# Patient Record
Sex: FEMALE | Race: WHITE | Employment: OTHER | URBAN - METROPOLITAN AREA
[De-identification: names, ages, dates, MRNs, and addresses within clinical notes are randomized per-mention and may not be internally consistent; named-entity substitution may affect disease eponyms.]

---

## 2017-03-13 ENCOUNTER — HOSPITAL ENCOUNTER (OUTPATIENT)
Dept: LAB | Age: 82
Discharge: HOME | End: 2017-03-13
Payer: MEDICARE

## 2017-03-13 ENCOUNTER — OFFICE VISIT (OUTPATIENT)
Dept: INTERNAL MEDICINE CLINIC | Age: 82
End: 2017-03-13

## 2017-03-13 VITALS
HEART RATE: 53 BPM | HEIGHT: 63 IN | RESPIRATION RATE: 18 BRPM | TEMPERATURE: 97.4 F | DIASTOLIC BLOOD PRESSURE: 70 MMHG | SYSTOLIC BLOOD PRESSURE: 100 MMHG | BODY MASS INDEX: 24.06 KG/M2 | WEIGHT: 135.8 LBS | OXYGEN SATURATION: 97 %

## 2017-03-13 DIAGNOSIS — I10 ESSENTIAL HYPERTENSION, BENIGN: Primary | ICD-10-CM

## 2017-03-13 DIAGNOSIS — R73.09 OTHER ABNORMAL GLUCOSE: ICD-10-CM

## 2017-03-13 DIAGNOSIS — Z23 NEED FOR TDAP VACCINATION: ICD-10-CM

## 2017-03-13 DIAGNOSIS — Z23 ENCOUNTER FOR IMMUNIZATION: ICD-10-CM

## 2017-03-13 DIAGNOSIS — R41.3 MEMORY LOSS: ICD-10-CM

## 2017-03-13 DIAGNOSIS — Z13.5 GLAUCOMA SCREENING: ICD-10-CM

## 2017-03-13 DIAGNOSIS — Z71.89 ADVANCE DIRECTIVE DISCUSSED WITH PATIENT: ICD-10-CM

## 2017-03-13 PROCEDURE — 81001 URINALYSIS AUTO W/SCOPE: CPT

## 2017-03-13 PROCEDURE — 80053 COMPREHEN METABOLIC PANEL: CPT

## 2017-03-13 PROCEDURE — 85025 COMPLETE CBC W/AUTO DIFF WBC: CPT

## 2017-03-13 PROCEDURE — 83036 HEMOGLOBIN GLYCOSYLATED A1C: CPT

## 2017-03-13 PROCEDURE — 36415 COLL VENOUS BLD VENIPUNCTURE: CPT

## 2017-03-13 NOTE — PROGRESS NOTES
HISTORY OF PRESENT ILLNESS  Sarah Mejia is a 80 y.o. female. HPI Natan Brandt is seen today for follow up of hypertension and other medical problems. She is accompanied by her daughter Martina Archibald. 1. Essential hypertension. Stable on current regimen. 2. Memory loss. Stable. She still lives in her own home accompanied by her son who has some disabilities of his own. However, she has an excellent support network. 3. IFG. Recent labs have been acceptable. Social history notable for her being very active. She walks daily with her son LEEANNE.      MedDATA/gwo         Review of Systems   Constitutional: Negative for weight loss. Respiratory: Negative. Cardiovascular: Negative for chest pain, palpitations, leg swelling and PND. Musculoskeletal: Negative for myalgias. Neurological: Negative for focal weakness. Psychiatric/Behavioral: Positive for memory loss. Physical Exam   Constitutional: She appears well-nourished. Neck: Carotid bruit is not present. Cardiovascular: Normal rate and regular rhythm. Exam reveals no gallop and no friction rub. No murmur heard. Pulmonary/Chest: Effort normal and breath sounds normal. No respiratory distress. Musculoskeletal: She exhibits no edema. Nursing note and vitals reviewed. ASSESSMENT and PLAN  Natan Brandt was seen today for medication evaluation. Diagnoses and all orders for this visit:    Essential hypertension, benign  -     METABOLIC PANEL, COMPREHENSIVE  -     CBC WITH AUTOMATED DIFF  -     URINALYSIS W/MICROSCOPIC    Need for Tdap vaccination  -     diph,Pertuss,Acell,,Tet Vac-PF (ADACEL) 2 Lf-(2.5-5-3-5 mcg)-5Lf/0.5 mL susp; 0.5 mL by IntraMUSCular route once for 1 dose. Encounter for immunization  -     pneumococcal 13 breanna conj dip (PREVNAR-13) 0.5 mL syrg injection; 0.5 mL by IntraMUSCular route once for 1 dose.     Glaucoma screening  -     REFERRAL TO OPHTHALMOLOGY    Advance directive discussed with patient    Other abnormal glucose  -     HEMOGLOBIN A1C WITH EAG    Memory loss- Continue current regimen of prescription and / or OTC medications     Other orders  -     MICROSCOPIC EXAMINATION    This has been fully explained to the patient, who indicates understanding.

## 2017-03-13 NOTE — MR AVS SNAPSHOT
Visit Information Date & Time Provider Department Dept. Phone Encounter #  
 3/13/2017  3:25 PM Atha Oppenheim, [address] [facility] [id number] Follow-up Instructions Return in about 1 year (around 3/13/2018). Upcoming Health Maintenance Date Due  
 GLAUCOMA SCREENING Q2Y 5/21/1990 MEDICARE YEARLY EXAM 5/21/1990 Pneumococcal 65+ Low/Medium Risk (2 of 2 - PPSV23) 10/27/2010 DTaP/Tdap/Td series (1 - Tdap) 5/13/2013 INFLUENZA AGE 9 TO ADULT 8/1/2016 Allergies as of 3/13/2017  Review Complete On: 3/13/2017 By: Atha Oppenheim, MD  
  
 Severity Noted Reaction Type Reactions Cephalexin  11/21/2009    Nausea Only Macrobid [Nitrofurantoin Monohyd/m-cryst]  11/21/2009    Myalgia Razadyne [Galantamine]  11/21/2009    Nausea Only Sulfa (Sulfonamide Antibiotics)  11/21/2009    Rash Current Immunizations  Reviewed on 3/13/2017 Name Date Influenza High Dose Vaccine PF 1/1/2017, 11/24/2015 Influenza Vaccine Split 10/10/2010 PPD 8/23/2011 Pneumococcal Vaccine (Unspecified Type) 10/27/2005 Td 5/12/2013 Zoster 6/1/2007 Reviewed by Rodríguez Rahman on 3/13/2017 at  3:48 PM  
You Were Diagnosed With   
  
 Codes Comments Essential hypertension, benign    -  Primary ICD-10-CM: I10 
ICD-9-CM: 401.1 Need for Tdap vaccination     ICD-10-CM: Z93 ICD-9-CM: V06.1 Encounter for immunization     ICD-10-CM: U90 ICD-9-CM: V03.89 Glaucoma screening     ICD-10-CM: Z13.5 ICD-9-CM: V80.1 Advance directive discussed with patient     ICD-10-CM: Z71.89 ICD-9-CM: V65.49 Other abnormal glucose     ICD-10-CM: R73.09 
ICD-9-CM: 790.29 Memory loss     ICD-10-CM: R41.3 ICD-9-CM: 780.93 Vitals BP Pulse Temp Resp Height(growth percentile) Weight(growth percentile)  100/70 (BP 1 Location: Right arm, BP Patient Position: Sitting) (!) 53 97.4 °F (36.3 °C) (Oral) 18 5' 3\" (1.6 m) 135 lb 12.8 oz (61.6 kg) SpO2 BMI OB Status Smoking Status 97% 24.06 kg/m2 Postmenopausal Never Smoker BMI and BSA Data Body Mass Index Body Surface Area 24.06 kg/m 2 1.65 m 2 Preferred Pharmacy Pharmacy Name Phone [id number] [address], [address] [phone] Your Updated Medication List  
  
   
This list is accurate as of: 3/13/17  4:24 PM.  Always use your most recent med list.  
  
  
  
  
 atenolol 25 mg tablet Commonly known as:  TENORMIN  
TAKE 1 TABLET BY MOUTH EVERY MORNING  
  
 desonide 0.05 % cream  
Commonly known as:  TRIDESILON Apply  to affected area two (2) times daily as needed for Skin Irritation. diph,Pertuss(Acell),Tet Vac-PF 2 Lf-(2.5-5-3-5 mcg)-5Lf/0.5 mL susp Commonly known as:  ADACEL  
0.5 mL by IntraMUSCular route once for 1 dose. donepezil 10 mg tablet Commonly known as:  ARICEPT  
TAKE 1 TAB BY MOUTH DAILY. hydroCHLOROthiazide 12.5 mg capsule Commonly known as:   TAKE ONE CAPSULE BY MOUTH EVERY MORNING  
  
 KLOR-CON 8 8 mEq tablet Generic drug:  potassium chloride SR  
TAKE 1 TABLET BY MOUTH EVERY DAY  
  
 memantine 10 mg tablet Commonly known as:  Jerry Congo TAKE 1 TABLET BY MOUTH TWICE A DAY  
  
 pneumococcal 13 breanna conj dip 0.5 mL Syrg injection Commonly known as:  PREVNAR-13  
0.5 mL by IntraMUSCular route once for 1 dose. THERAPEUTIC MULTIVIT/MINERAL 27-0.4 mg Tab Generic drug:  multivitamin,tx-iron-ca-min Take 1 Tab by mouth every morning. Prescriptions Printed Refills diph,Pertuss,Acell,,Tet Vac-PF (ADACEL) 2 Lf-(2.5-5-3-5 mcg)-5Lf/0.5 mL susp 0 Si.5 mL by IntraMUSCular route once for 1 dose. Class: Print Route: IntraMUSCular  
 pneumococcal 13 breanna conj dip (PREVNAR-13) 0.5 mL syrg injection 0 Si.5 mL by IntraMUSCular route once for 1 dose. Class: Print Route: IntraMUSCular We Performed the Following CBC WITH AUTOMATED DIFF [26969 CPT(R)] HEMOGLOBIN A1C WITH EAG [62086 CPT(R)] METABOLIC PANEL, COMPREHENSIVE [07669 CPT(R)] REFERRAL TO OPHTHALMOLOGY [REF57 Custom] URINALYSIS W/MICROSCOPIC [31716 CPT(R)] Follow-up Instructions Return in about 1 year (around 3/13/2018). Referral Information Referral ID Referred By Referred To  
  
 [id number] SANDER TRUONG Not Available Visits Status Start Date End Date 1 New Request 3/13/17 3/13/18 If your referral has a status of pending review or denied, additional information will be sent to support the outcome of this decision. Patient Instructions End of life planning discussed with patient. Patient states that they do have an advance directive and will provide a copy for our office at next visit. Introducing [facility] & HEALTH SERVICES! Ike Schwartz introduces Quitbit patient portal. Now you can access parts of your medical record, email your doctor's office, and request medication refills online. 1. In your internet browser, go to [email or web address] 2. Click on the First Time User? Click Here link in the Sign In box. You will see the New Member Sign Up page. 3. Enter your Quitbit Access Code exactly as it appears below. You will not need to use this code after youve completed the sign-up process. If you do not sign up before the expiration date, you must request a new code. · Quitbit Access Code: [email or web address] Expires: 6/11/2017  3:35 PM 
 
4. Enter the last four digits of your Social Security Number (xxxx) and Date of Birth (mm/dd/yyyy) as indicated and click Submit. You will be taken to the next sign-up page. 5. Create a Packbackt ID. This will be your Quitbit login ID and cannot be changed, so think of one that is secure and easy to remember. 6. Create a Packbackt password. You can change your password at any time. 7. Enter your Password Reset Question and Answer. This can be used at a later time if you forget your password. 8. Enter your e-mail address. You will receive e-mail notification when new information is available in [address] 9. Click Sign Up. You can now view and download portions of your medical record. 10. Click the Download Summary menu link to download a portable copy of your medical information. If you have questions, please visit the Frequently Asked Questions section of the ClickMedix website. Remember, ClickMedix is NOT to be used for urgent needs. For medical emergencies, dial 911. Now available from your iPhone and Android! Please provide this summary of care documentation to your next provider. Your primary care clinician is listed as SANDER TRUONG. If you have any questions after today's visit, please call [phone].

## 2017-03-14 LAB
ALBUMIN SERPL-MCNC: 4.3 G/DL (ref 3.2–4.6)
ALBUMIN/GLOB SERPL: 1.7 {RATIO} (ref 1.2–2.2)
ALP SERPL-CCNC: 61 IU/L (ref 39–117)
ALT SERPL-CCNC: 12 IU/L (ref 0–32)
APPEARANCE UR: ABNORMAL
AST SERPL-CCNC: 16 IU/L (ref 0–40)
BACTERIA #/AREA URNS HPF: ABNORMAL /[HPF]
BASOPHILS # BLD AUTO: 0 X10E3/UL (ref 0–0.2)
BASOPHILS NFR BLD AUTO: 1 %
BILIRUB SERPL-MCNC: 0.4 MG/DL (ref 0–1.2)
BILIRUB UR QL STRIP: NEGATIVE
BUN SERPL-MCNC: 24 MG/DL (ref 10–36)
BUN/CREAT SERPL: 34 (ref 11–26)
CALCIUM SERPL-MCNC: 9.4 MG/DL (ref 8.7–10.3)
CASTS URNS QL MICRO: ABNORMAL /LPF
CHLORIDE SERPL-SCNC: 100 MMOL/L (ref 96–106)
CO2 SERPL-SCNC: 26 MMOL/L (ref 18–29)
COLOR UR: YELLOW
CREAT SERPL-MCNC: 0.7 MG/DL (ref 0.57–1)
EOSINOPHIL # BLD AUTO: 0.1 X10E3/UL (ref 0–0.4)
EOSINOPHIL NFR BLD AUTO: 2 %
EPI CELLS #/AREA URNS HPF: ABNORMAL /HPF
ERYTHROCYTE [DISTWIDTH] IN BLOOD BY AUTOMATED COUNT: 14.6 % (ref 12.3–15.4)
EST. AVERAGE GLUCOSE BLD GHB EST-MCNC: 105 MG/DL
GLOBULIN SER CALC-MCNC: 2.5 G/DL (ref 1.5–4.5)
GLUCOSE SERPL-MCNC: 85 MG/DL (ref 65–99)
GLUCOSE UR QL: NEGATIVE
HBA1C MFR BLD: 5.3 % (ref 4.8–5.6)
HCT VFR BLD AUTO: 47.9 % (ref 34–46.6)
HGB BLD-MCNC: 16 G/DL (ref 11.1–15.9)
HGB UR QL STRIP: ABNORMAL
IMM GRANULOCYTES # BLD: 0 X10E3/UL (ref 0–0.1)
IMM GRANULOCYTES NFR BLD: 0 %
KETONES UR QL STRIP: NEGATIVE
LEUKOCYTE ESTERASE UR QL STRIP: ABNORMAL
LYMPHOCYTES # BLD AUTO: 1 X10E3/UL (ref 0.7–3.1)
LYMPHOCYTES NFR BLD AUTO: 15 %
MCH RBC QN AUTO: 30.2 PG (ref 26.6–33)
MCHC RBC AUTO-ENTMCNC: 33.4 G/DL (ref 31.5–35.7)
MCV RBC AUTO: 90 FL (ref 79–97)
MICRO URNS: ABNORMAL
MONOCYTES # BLD AUTO: 0.9 X10E3/UL (ref 0.1–0.9)
MONOCYTES NFR BLD AUTO: 14 %
MUCOUS THREADS URNS QL MICRO: PRESENT
NEUTROPHILS # BLD AUTO: 4.6 X10E3/UL (ref 1.4–7)
NEUTROPHILS NFR BLD AUTO: 68 %
NITRITE UR QL STRIP: POSITIVE
PH UR STRIP: 6.5 [PH] (ref 5–7.5)
PLATELET # BLD AUTO: 180 X10E3/UL (ref 150–379)
POTASSIUM SERPL-SCNC: 4.2 MMOL/L (ref 3.5–5.2)
PROT SERPL-MCNC: 6.8 G/DL (ref 6–8.5)
PROT UR QL STRIP: ABNORMAL
RBC # BLD AUTO: 5.3 X10E6/UL (ref 3.77–5.28)
RBC #/AREA URNS HPF: ABNORMAL /HPF
SODIUM SERPL-SCNC: 144 MMOL/L (ref 134–144)
SP GR UR: 1.02 (ref 1–1.03)
UROBILINOGEN UR STRIP-MCNC: 1 MG/DL (ref 0.2–1)
WBC # BLD AUTO: 6.8 X10E3/UL (ref 3.4–10.8)
WBC #/AREA URNS HPF: >30 /HPF

## 2017-03-14 NOTE — PROGRESS NOTES
Call pt and daughter Rodo Bettencourt- There may be a uti. If symptoms are present obtain culture and start treatment. If no symptoms, obtain culture and treat only if positive.    Will review rest of labs following return of urine culture

## 2017-03-15 ENCOUNTER — HOSPITAL ENCOUNTER (OUTPATIENT)
Dept: LAB | Age: 82
Discharge: HOME | End: 2017-03-15
Payer: MEDICARE

## 2017-03-15 DIAGNOSIS — N39.0 URINARY TRACT INFECTION, SITE UNSPECIFIED: Primary | ICD-10-CM

## 2017-03-15 PROCEDURE — 87186 SC STD MICRODIL/AGAR DIL: CPT

## 2017-03-15 PROCEDURE — 87086 URINE CULTURE/COLONY COUNT: CPT

## 2017-03-15 PROCEDURE — 87088 URINE BACTERIA CULTURE: CPT

## 2017-03-15 NOTE — PROGRESS NOTES
Spoke with pt's daughter Prince Marti (on [facility]) - advised pt's urinalysis shows she may have a UTI - she states pt not complaining of any symptoms at this time. Advised MD recommends she return for urine culture - if positive will treat. Lab slip at . She will bring pt in today. MD will review rest of labs following return of urine culture.

## 2017-03-17 DIAGNOSIS — R79.9 ABNORMAL FINDING OF BLOOD CHEMISTRY: ICD-10-CM

## 2017-03-17 DIAGNOSIS — D75.1 POLYCYTHEMIA: Primary | ICD-10-CM

## 2017-03-17 DIAGNOSIS — N39.0 URINARY TRACT INFECTION, SITE UNSPECIFIED: ICD-10-CM

## 2017-03-17 LAB — BACTERIA UR CULT: ABNORMAL

## 2017-03-17 RX ORDER — DOXYCYCLINE 100 MG/1
100 TABLET ORAL 2 TIMES DAILY
Qty: 10 TAB | Refills: 0 | Status: SHIPPED | OUTPATIENT
Start: 2017-03-17 | End: 2017-03-22

## 2017-03-17 NOTE — PROGRESS NOTES
Call daughter Anabella Ferris- 2 things  - there is a uti, start doxycycline 100 mg bid x 5 d. Take with food,, will tg urine with culture in 1 mo-   - there is a slight increase in red cell count, likely nothing but need to tg 1 mo : cbc with diff, iron, ferritin= dx is polycythemia.  Lab only, no visit needed  - other Labs look great overall

## 2017-03-17 NOTE — PROGRESS NOTES
Spoke with pt's son Sandra Valencia (on [employer]) advised there are 2 things with pt's labs:  - there is a uti, start doxycycline 100 mg bid x 5 d. Take with food - will tg urine with culture in 1 month - sent to pharmacy. - there is a slight increase in red cell count - likely nothing but need to tg 1 month - cbc with diff, iron, ferritin= dx is polycythemia. Lab only - no visit needed - mailed lab slip to son's home address as he requested - [place] [address] [place] [address], [address] Advised pt's other labs look great overall.

## 2017-05-01 ENCOUNTER — HOSPITAL ENCOUNTER (OUTPATIENT)
Dept: LAB | Age: 82
Discharge: HOME | End: 2017-05-01
Payer: MEDICARE

## 2017-05-01 PROCEDURE — 87088 URINE BACTERIA CULTURE: CPT

## 2017-05-01 PROCEDURE — 83550 IRON BINDING TEST: CPT

## 2017-05-01 PROCEDURE — 85025 COMPLETE CBC W/AUTO DIFF WBC: CPT

## 2017-05-01 PROCEDURE — 36415 COLL VENOUS BLD VENIPUNCTURE: CPT

## 2017-05-01 PROCEDURE — 82728 ASSAY OF FERRITIN: CPT

## 2017-05-02 LAB
BACTERIA UR CULT: NORMAL
BASOPHILS # BLD AUTO: 0 X10E3/UL (ref 0–0.2)
BASOPHILS NFR BLD AUTO: 1 %
EOSINOPHIL # BLD AUTO: 0.2 X10E3/UL (ref 0–0.4)
EOSINOPHIL NFR BLD AUTO: 2 %
ERYTHROCYTE [DISTWIDTH] IN BLOOD BY AUTOMATED COUNT: 14.6 % (ref 12.3–15.4)
FERRITIN SERPL-MCNC: 227 NG/ML (ref 15–150)
HCT VFR BLD AUTO: 47.6 % (ref 34–46.6)
HGB BLD-MCNC: 15.7 G/DL (ref 11.1–15.9)
IMM GRANULOCYTES # BLD: 0 X10E3/UL (ref 0–0.1)
IMM GRANULOCYTES NFR BLD: 0 %
IRON SATN MFR SERPL: 29 % (ref 15–55)
IRON SERPL-MCNC: 80 UG/DL (ref 27–139)
LYMPHOCYTES # BLD AUTO: 1.6 X10E3/UL (ref 0.7–3.1)
LYMPHOCYTES NFR BLD AUTO: 21 %
MCH RBC QN AUTO: 29.5 PG (ref 26.6–33)
MCHC RBC AUTO-ENTMCNC: 33 G/DL (ref 31.5–35.7)
MCV RBC AUTO: 89 FL (ref 79–97)
MONOCYTES # BLD AUTO: 0.7 X10E3/UL (ref 0.1–0.9)
MONOCYTES NFR BLD AUTO: 9 %
NEUTROPHILS # BLD AUTO: 5.1 X10E3/UL (ref 1.4–7)
NEUTROPHILS NFR BLD AUTO: 67 %
PLATELET # BLD AUTO: 228 X10E3/UL (ref 150–379)
RBC # BLD AUTO: 5.33 X10E6/UL (ref 3.77–5.28)
TIBC SERPL-MCNC: 274 UG/DL (ref 250–450)
UIBC SERPL-MCNC: 194 UG/DL (ref 118–369)
WBC # BLD AUTO: 7.5 X10E3/UL (ref 3.4–10.8)

## 2017-05-02 NOTE — PROGRESS NOTES
Call daughter Kale Abel- repeat labs are ok, no sign of iron overload.  No need for further follow up for this

## 2017-05-02 NOTE — PROGRESS NOTES
Advised pt's daughter Grayson Abdullahi - repeat labs are ok - no sign of iron overload. No need for further follow up for this.

## 2017-05-07 RX ORDER — ATENOLOL 25 MG/1
TABLET ORAL
Qty: 30 TAB | Refills: 10 | Status: SHIPPED | OUTPATIENT
Start: 2017-05-07 | End: 2018-04-03 | Stop reason: SDUPTHER

## 2017-05-20 RX ORDER — DONEPEZIL HYDROCHLORIDE 10 MG/1
TABLET, FILM COATED ORAL
Qty: 30 TAB | Refills: 11 | Status: SHIPPED | OUTPATIENT
Start: 2017-05-20 | End: 2018-02-20

## 2017-05-22 ENCOUNTER — TELEPHONE (OUTPATIENT)
Dept: INTERNAL MEDICINE CLINIC | Age: 82
End: 2017-05-22

## 2017-05-22 NOTE — TELEPHONE ENCOUNTER
Spoke with pt's daughter Melisa Mcneil - states she has concerns that pt has not been taking her medications as she should. Has a brother who helps with her care - he feels pt is 80 and does not need to take all these meds. We discussed the medications she is on - MD would not have her on medications if she did not need them. She wants to know what MD thinks? Should she schedule an appt to review with brother?  Will forward to MD.

## 2017-05-22 NOTE — TELEPHONE ENCOUNTER
[phone] pt's daughter is requesting a call regarding her radhaer's medications, she says that she just found out yesterday she is not taking her meds like she should and she does not know what to do about it.

## 2017-05-23 NOTE — TELEPHONE ENCOUNTER
Advised pt's daughter Harsha Vidales MD said pt is on medications appropriate for her medical conditions, if they want to schedule a visit to discuss this they could. She staes she did not need appt - just wanted MD to tell her so she could tell her brother.

## 2017-07-23 RX ORDER — POTASSIUM CHLORIDE 600 MG/1
TABLET, FILM COATED, EXTENDED RELEASE ORAL
Qty: 30 TAB | Refills: 11 | Status: SHIPPED | COMMUNITY
Start: 2017-07-23 | End: 2018-05-11 | Stop reason: SDUPTHER

## 2017-07-23 RX ORDER — HYDROCHLOROTHIAZIDE 12.5 MG/1
CAPSULE ORAL
Qty: 30 CAP | Refills: 11 | Status: SHIPPED | COMMUNITY
Start: 2017-07-23 | End: 2018-05-09 | Stop reason: SDUPTHER

## 2017-07-25 ENCOUNTER — OFFICE VISIT (OUTPATIENT)
Dept: INTERNAL MEDICINE CLINIC | Age: 82
End: 2017-07-25

## 2017-07-25 ENCOUNTER — TELEPHONE (OUTPATIENT)
Dept: INTERNAL MEDICINE CLINIC | Age: 82
End: 2017-07-25

## 2017-07-25 VITALS
HEART RATE: 59 BPM | OXYGEN SATURATION: 97 % | DIASTOLIC BLOOD PRESSURE: 80 MMHG | TEMPERATURE: 97.5 F | SYSTOLIC BLOOD PRESSURE: 130 MMHG | WEIGHT: 132.6 LBS | HEIGHT: 63 IN | BODY MASS INDEX: 23.5 KG/M2 | RESPIRATION RATE: 19 BRPM

## 2017-07-25 DIAGNOSIS — B02.9 HERPES ZOSTER WITHOUT COMPLICATION: Primary | ICD-10-CM

## 2017-07-25 RX ORDER — VALACYCLOVIR HYDROCHLORIDE 1 G/1
1000 TABLET, FILM COATED ORAL 3 TIMES DAILY
Qty: 21 TAB | Refills: 0 | Status: SHIPPED | OUTPATIENT
Start: 2017-07-25 | End: 2017-08-01

## 2017-07-25 NOTE — PATIENT INSTRUCTIONS

## 2017-07-25 NOTE — TELEPHONE ENCOUNTER
Called and spoke to pt daughter Dain Hauser- she states she org called the on call doctor, her mother is very confused and has dementia. She said her brother that lives with her mother was recently dxed with shingles so not sure if she now has it also. when she went to see her mother over the weekend  she had a new facial rash- describes the rash as being reddened with a round [place] pattern. She states her mother states the rash causes no pain or itching. Advised she should have her mother come in for a ov for evaluation/treatment. Dr. Sofía Billings schedule is all booked for today.  I have added pt to schedule today to see Dr. Maribel Hong @ 3:15pm.

## 2017-07-25 NOTE — TELEPHONE ENCOUNTER
Received call from patient stating that she had a rash on her face. When I called back she asked \"Why are you calling me? Maybe my daughter called\"  Attempted to call her daughter Marti Speaks (also received page from her) but went to nondescript answering machine so message so left answer service. Will forward to Team 3 to contact the patient for an acute appt today.

## 2017-07-25 NOTE — MR AVS SNAPSHOT
Visit Information Date & Time Provider Department Dept. Phone Encounter #  
 7/25/2017  3:15 PM Jared Chinchilla, 12[address] [facility] [phone] [id number] Follow-up Instructions Return if symptoms worsen or fail to improve. Upcoming Health Maintenance Date Due  
 GLAUCOMA SCREENING Q2Y 5/21/1990 MEDICARE YEARLY EXAM 5/21/1990 INFLUENZA AGE 9 TO ADULT 8/1/2017 DTaP/Tdap/Td series (2 - Td) 4/6/2027 Allergies as of 7/25/2017  Review Complete On: 7/25/2017 By: Jared Chinchilla MD  
  
 Severity Noted Reaction Type Reactions Cephalexin  11/21/2009    Nausea Only Macrobid [Nitrofurantoin Monohyd/m-cryst]  11/21/2009    Myalgia Razadyne [Galantamine]  11/21/2009    Nausea Only Sulfa (Sulfonamide Antibiotics)  11/21/2009    Rash Current Immunizations  Reviewed on 4/11/2017 Name Date Influenza High Dose Vaccine PF 1/1/2017, 11/24/2015 Influenza Vaccine Split 10/10/2010 PPD 8/23/2011 Pneumococcal Conjugate (PCV-13) 3/28/2017 Td 5/12/2013 Tdap 4/6/2017 ZZZ-RETIRED (DO NOT USE) Pneumococcal Vaccine (Unspecified Type) 10/27/2005 Zoster 6/1/2007 Not reviewed this visit You Were Diagnosed With   
  
 Codes Comments Herpes zoster without complication    -  Primary ICD-10-CM: B02.9 ICD-9-CM: 887. 9 Vitals BP Pulse Temp Resp Height(growth percentile) Weight(growth percentile) 130/80 (BP 1 Location: Right arm, BP Patient Position: Sitting) (!) 59 97.5 °F (36.4 °C) (Oral) 19 5' 3\" (1.6 m) 132 lb 9.6 oz (60.1 kg) SpO2 BMI OB Status Smoking Status 97% 23.49 kg/m2 Postmenopausal Never Smoker BMI and BSA Data Body Mass Index Body Surface Area  
 23.49 kg/m 2 1.63 m 2 Preferred Pharmacy Pharmacy Name Phone [id number] [phone] Your Updated Medication List  
  
   
 This list is accurate as of: 7/25/17  4:49 PM.  Always use your most recent med list.  
  
  
  
  
 atenolol 25 mg tablet Commonly known as:  TENORMIN  
TAKE 1 TABLET BY MOUTH EVERY MORNING  
  
 desonide 0.05 % cream  
Commonly known as:  TRIDESILON Apply  to affected area two (2) times daily as needed for Skin Irritation. donepezil 10 mg tablet Commonly known as:  ARICEPT  
TAKE 1 TAB BY MOUTH DAILY. hydroCHLOROthiazide 12.5 mg capsule Commonly known as:  Lacho Jyotsna TAKE ONE CAPSULE BY MOUTH EVERY MORNING  
  
 KLOR-CON 8 8 mEq tablet Generic drug:  potassium chloride SR  
TAKE 1 TABLET BY MOUTH EVERY DAY  
  
 memantine 10 mg tablet Commonly known as:  Mammie Nation TAKE 1 TABLET BY MOUTH TWICE A DAY THERAPEUTIC MULTIVIT/MINERAL 27-0.4 mg Tab Generic drug:  multivitamin,tx-iron-ca-min Take 1 Tab by mouth every morning. valACYclovir 1 gram tablet Commonly known as:  VALTREX Take 1 Tab by mouth three (3) times daily for 7 days. Prescriptions Sent to Pharmacy Refills  
 valACYclovir (VALTREX) 1 gram tablet 0 Sig: Take 1 Tab by mouth three (3) times daily for 7 days. Class: Normal  
 Pharmacy: [facility]/pharmacy #[id number] Ph #: [phone] Route: Oral  
  
Follow-up Instructions Return if symptoms worsen or fail to improve. Patient Instructions Shingles: Care Instructions Your Care Instructions Shingles (herpes zoster) causes pain and a blistered rash. The rash can appear anywhere on the body but will be on only one side of the body, the left or right. It will be in a band, a strip, or a small area. The pain can be very severe. Shingles can also cause tingling or itching in the area of the rash. The blisters scab over after a few days and heal in 2 to 4 weeks. Medicines can help you feel better and may help prevent more serious problems caused by shingles. Shingles is caused by the same virus that causes chickenpox. When you have chickenpox, the virus gets into your nerve roots and stays there (becomes dormant) long after you get over the chickenpox. If the virus becomes active again, it can cause shingles. Follow-up care is a key part of your treatment and safety. Be sure to make and go to all appointments, and call your doctor if you are having problems. It's also a good idea to know your test results and keep a list of the medicines you take. How can you care for yourself at home? · Be safe with medicines. Take your medicines exactly as prescribed. Call your doctor if you think you are having a problem with your medicine. Antiviral medicine helps you get better faster. · Try not to scratch or pick at the blisters. They will crust over and fall off on their own if you leave them alone. · Put cool, wet cloths on the area to relieve pain and itching. You can also use calamine lotion. Try not to use so much lotion that it cakes and is hard to get off. · Put cornstarch or baking soda on the sores to help dry them out so they heal faster. · Do not use thick ointment, such as petroleum jelly, on the sores. This will keep them from drying and healing. · To help remove loose crusts, soak them in tap water. This can help decrease oozing, and dry and soothe the skin. · Take an over-the-counter pain medicine, such as acetaminophen (Tylenol), ibuprofen (Advil, Motrin), or naproxen (Aleve). Read and follow all instructions on the label. · Avoid close contact with people until the blisters have healed. It is very important for you to avoid contact with anyone who has never had chickenpox or the chickenpox vaccine. Pregnant women, young babies, and anyone else who has a hard time fighting infection (such as someone with HIV, diabetes, or cancer) is especially at risk. When should you call for help? Call your doctor now or seek immediate medical care if: · You have a new or higher fever. · You have a severe headache and a stiff neck. · You lose the ability to think clearly. · The rash spreads to your forehead, nose, eyes, or eyelids. · You have eye pain, or your vision gets worse. · You have new pain in your face, or you cannot move the muscles in your face. · Blisters spread to new parts of your body. Watch closely for changes in your health, and be sure to contact your doctor if: · The rash has not healed after 2 to 4 weeks. · You still have pain after the rash has healed. Where can you learn more? Go to [email or web address]-johanna.info/. Juan Flatten in the search box to learn more about \"Shingles: Care Instructions. \" Current as of: March 3, 2017 Content Version: 11.3 © 7135-3267 [employer]. Care instructions adapted under license by [employer] (which disclaims liability or warranty for this information). If you have questions about a medical condition or this instruction, always ask your healthcare professional. [address] any warranty or liability for your use of this information. Introducing [facility] & HEALTH SERVICES! Glenn Gordon introduces [employer] Products [employer] patient portal. Now you can access parts of your medical record, email your doctor's office, and request medication refills online. 1. In your internet browser, go to [email or web address] 2. Click on the First Time User? Click Here link in the Sign In box. You will see the New Member Sign Up page. 3. Enter your [employer] Access Code exactly as it appears below. You will not need to use this code after youve completed the sign-up process. If you do not sign up before the expiration date, you must request a new code. · [employer] Access Code: [email or web address] Expires: 10/23/2017 11:38 AM 
 
4.  Enter the last four digits of your Social Security Number (xxxx) and Date of Birth (mm/dd/yyyy) as indicated and click Submit. You will be taken to the next sign-up page. 5. Create a PlayFilm ID. This will be your PlayFilm login ID and cannot be changed, so think of one that is secure and easy to remember. 6. Create a PlayFilm password. You can change your password at any time. 7. Enter your Password Reset Question and Answer. This can be used at a later time if you forget your password. 8. Enter your e-mail address. You will receive e-mail notification when new information is available in [address] 9. Click Sign Up. You can now view and download portions of your medical record. 10. Click the Download Summary menu link to download a portable copy of your medical information. If you have questions, please visit the Frequently Asked Questions section of the PlayFilm website. Remember, PlayFilm is NOT to be used for urgent needs. For medical emergencies, dial 911. Now available from your iPhone and Android! Please provide this summary of care documentation to your next provider. Your primary care clinician is listed as SANDER TRUONG. If you have any questions after today's visit, please call [phone].

## 2017-07-25 NOTE — PROGRESS NOTES
Jessa Deal is a 80 y.o. female who was seen in clinic today (7/25/2017). Returns to clinic with her son and daughter. Patient was checked in but PSR did not complete the process and patient sat in the waiting room for > 1 hour. Assessment & Plan:  Diagnoses and all orders for this visit:    1. Herpes zoster without complication- new diagnosis, reviewed differential diagnosis with her and family, looks classic, will start with meds below. Red flags and expectations were reviewed & discussed with the her. She verbalized understanding.   -     valACYclovir (VALTREX) 1 gram tablet; Take 1 Tab by mouth three (3) times daily for 7 days. Follow-up Disposition:  Return if symptoms worsen or fail to improve.       ----------------------------------------------------------------------    Subjective:  Junior White was seen today for Shingles    Dermatology Review  She is here to talk about skin lesions. She denies any issues. Family noticed it a yesterday, but last time they saw her was 4day ago, and with no changes since that time. Location: face. Treatment to date has included none. Her son has an active case of shingles currently. She did get shingles vaccine in the past.      ----------------------------------------------------------------------      Prior to Admission medications    Medication Sig Start Date End Date Taking? Authorizing Provider   KLOR-CON 8 8 mEq tablet TAKE 1 TABLET BY MOUTH EVERY DAY 7/23/17  Yes Marcus Wasserman MD   hydroCHLOROthiazide (MICROZIDE) 12.5 mg capsule TAKE ONE CAPSULE BY MOUTH EVERY MORNING 7/23/17  Yes Marcus Wasserman MD   donepezil (ARICEPT) 10 mg tablet TAKE 1 TAB BY MOUTH DAILY.  5/20/17  Yes Marcus Wasserman MD   atenolol (TENORMIN) 25 mg tablet TAKE 1 TABLET BY MOUTH EVERY MORNING 5/7/17  Yes Marcus Wasserman MD   memantine (NAMENDA) 10 mg tablet TAKE 1 TABLET BY MOUTH TWICE A DAY 5/4/16  Yes Marcus Wasserman MD   desonide (TRIDESILON) 0.05 % cream Apply  to affected area two (2) times daily as needed for Skin Irritation. 6/30/15  Yes Sachin Thomas MD   multivitamin,tx-iron-ca-min (THERAPEUTIC MULTIVIT/MINERAL) 27-0.4 mg Tab Take 1 Tab by mouth every morning. Yes Historical Provider          Allergies   Allergen Reactions    Cephalexin Nausea Only    Macrobid [Nitrofurantoin Monohyd/M-Cryst] Myalgia    Razadyne [Galantamine] Nausea Only    Sulfa (Sulfonamide Antibiotics) Rash           ROS: no fevers or chills      Objective:   Physical Exam   Constitutional: No distress. Cardiovascular: Regular rhythm and normal heart sounds. Bradycardia present. No murmur heard. Pulmonary/Chest: Effort normal and breath sounds normal. She has no wheezes. She has no rales. Skin:   10 sporadic lesions on the R side of the face, in the V2 dermatome, no vesicles or pustules. Covered by make up         Visit Vitals    /80 (BP 1 Location: Right arm, BP Patient Position: Sitting)    Pulse (!) 59    Temp 97.5 °F (36.4 °C) (Oral)    Resp 19    Ht 5' 3\" (1.6 m)    Wt 132 lb 9.6 oz (60.1 kg)    SpO2 97%    BMI 23.49 kg/m2         Disclaimer:  Advised her to call back or return to office if symptoms worsen/change/persist.  Discussed expected course/resolution/complications of diagnosis in detail with patient. Medication risks/benefits/costs/interactions/alternatives discussed with patient. She was given an after visit summary which includes diagnoses, current medications, & vitals. She expressed understanding with the diagnosis and plan.         Tomeka Avila MD

## 2017-07-31 ENCOUNTER — TELEPHONE (OUTPATIENT)
Dept: INTERNAL MEDICINE CLINIC | Age: 82
End: 2017-07-31

## 2017-07-31 NOTE — TELEPHONE ENCOUNTER
[phone] pt's daughter calling, she wonders if she and her brother could talk to dr Jose Luis Jean about what to do about taking care of their mother who had dementia.
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
## 2017-08-02 NOTE — TELEPHONE ENCOUNTER
Advised pt MD said this is not so much of a medical issue at this point. He suggest she consult an  who specializes in elder law. It seems to more of a disagreement between her and her brother, all he has to contribute is what is reported to him in terms of the occurrences at home. He can only comment on her medical status. He is so sorry for their problem - as family disagreements can be very difficult in this area. She said to thank MD for his advise.

## 2017-08-17 ENCOUNTER — HOSPITAL ENCOUNTER (EMERGENCY)
Age: 82
Discharge: HOME | End: 2017-08-17
Attending: EMERGENCY MEDICINE
Payer: MEDICARE

## 2017-08-17 ENCOUNTER — APPOINTMENT (OUTPATIENT)
Dept: CT IMAGING | Age: 82
End: 2017-08-17
Attending: EMERGENCY MEDICINE
Payer: MEDICARE

## 2017-08-17 VITALS
DIASTOLIC BLOOD PRESSURE: 76 MMHG | OXYGEN SATURATION: 99 % | RESPIRATION RATE: 18 BRPM | TEMPERATURE: 97.9 F | WEIGHT: 132 LBS | BODY MASS INDEX: 23.39 KG/M2 | HEIGHT: 63 IN | HEART RATE: 53 BPM | SYSTOLIC BLOOD PRESSURE: 147 MMHG

## 2017-08-17 DIAGNOSIS — M54.16 LUMBAR RADICULAR PAIN: Primary | ICD-10-CM

## 2017-08-17 PROCEDURE — G8979 MOBILITY GOAL STATUS: HCPCS

## 2017-08-17 PROCEDURE — 74011250637 HC RX REV CODE- 250/637: Performed by: EMERGENCY MEDICINE

## 2017-08-17 PROCEDURE — 97161 PT EVAL LOW COMPLEX 20 MIN: CPT

## 2017-08-17 PROCEDURE — 97116 GAIT TRAINING THERAPY: CPT

## 2017-08-17 PROCEDURE — G8980 MOBILITY D/C STATUS: HCPCS

## 2017-08-17 PROCEDURE — G8978 MOBILITY CURRENT STATUS: HCPCS

## 2017-08-17 PROCEDURE — 99284 EMERGENCY DEPT VISIT MOD MDM: CPT

## 2017-08-17 PROCEDURE — 72131 CT LUMBAR SPINE W/O DYE: CPT

## 2017-08-17 PROCEDURE — 97530 THERAPEUTIC ACTIVITIES: CPT

## 2017-08-17 RX ORDER — LORAZEPAM 1 MG/1
1 TABLET ORAL
Status: COMPLETED | OUTPATIENT
Start: 2017-08-17 | End: 2017-08-17

## 2017-08-17 RX ORDER — CYCLOBENZAPRINE HCL 10 MG
10 TABLET ORAL
Qty: 21 TAB | Refills: 0 | Status: SHIPPED | OUTPATIENT
Start: 2017-08-17 | End: 2017-08-24

## 2017-08-17 RX ORDER — BISMUTH SUBSALICYLATE 262 MG
1 TABLET,CHEWABLE ORAL DAILY
COMMUNITY

## 2017-08-17 RX ADMIN — LORAZEPAM 1 MG: 1 TABLET ORAL at 14:03

## 2017-08-17 NOTE — ED PROVIDER NOTES
HPI Comments: 80 y.o. female with past medical history significant for osteopenia, DJD, and HTN who presents from home via EMS with chief complaint of back pain. Pt states she was in her kitchen this afternoon when she had sudden onset of stabbing pain in her L-lower back pain. Pt denies radiation of the pain. Pt denies recent falls or trauma. Pt states she is normally very active and took a long walk yesterday. Pt states she felt fine this morning with no symptoms. Pt denies having a history of back problems or kidney stones. Pt denies having dysuria or hematuria. There are no other acute medical concerns at this time. Social hx: Pt lives with her son    PCP: Cori Masters MD    Note written by Charles Singleton. YupiCall, as dictated by Ming Gray MD 1:56 PM    The history is provided by the patient. Past Medical History:   Diagnosis Date    DJD (degenerative joint disease)     Hematuria, microscopic     Hypertension     Memory loss     Menopause     Osteopenia        Past Surgical History:   Procedure Laterality Date    HX HYSTERECTOMY           Family History:   Problem Relation Age of Onset    Heart Disease Mother      congestive heart failure    Stroke Father     Alcohol abuse Son     Alcohol abuse Daughter        Social History     Social History    Marital status:      Spouse name: N/A    Number of children: N/A    Years of education: N/A     Occupational History    Not on file. Social History Main Topics    Smoking status: Never Smoker    Smokeless tobacco: Never Used    Alcohol use No    Drug use: No    Sexual activity: No     Other Topics Concern    Not on file     Social History Narrative         ALLERGIES: Cephalexin; Macrobid [nitrofurantoin monohyd/m-cryst]; Razadyne [galantamine]; and Sulfa (sulfonamide antibiotics)    Review of Systems   Constitutional: Negative for activity change, chills and fever.    HENT: Negative for nosebleeds, sore throat, trouble swallowing and voice change. Eyes: Negative for visual disturbance. Respiratory: Negative for shortness of breath. Cardiovascular: Negative for chest pain and palpitations. Gastrointestinal: Negative for abdominal pain, constipation, diarrhea and nausea. Genitourinary: Negative for difficulty urinating, dysuria, hematuria and urgency. Musculoskeletal: Positive for back pain. Negative for neck pain and neck stiffness. Skin: Negative for color change. Allergic/Immunologic: Negative for immunocompromised state. Neurological: Negative for dizziness, seizures, syncope, weakness, light-headedness, numbness and headaches. Psychiatric/Behavioral: Negative for behavioral problems, confusion, hallucinations, self-injury and suicidal ideas. All other systems reviewed and are negative. Vitals:    08/17/17 1339   BP: 147/76   Pulse: (!) 53   Resp: 18   Temp: 97.9 °F (36.6 °C)   SpO2: 99%   Weight: 59.9 kg (132 lb)   Height: 5' 3\" (1.6 m)            Physical Exam   Constitutional: She is oriented to person, place, and time. She appears well-developed and well-nourished. No distress. HENT:   Head: Normocephalic and atraumatic. Eyes: Pupils are equal, round, and reactive to light. Neck: Normal range of motion. Neck supple. Cardiovascular: Normal rate, regular rhythm and normal heart sounds. Exam reveals no gallop and no friction rub. No murmur heard. Pulmonary/Chest: Effort normal and breath sounds normal. No respiratory distress. She has no wheezes. Abdominal: Soft. Bowel sounds are normal. She exhibits no distension. There is no tenderness. There is no rebound and no guarding. Musculoskeletal: Normal range of motion. L-sided paraspinal tenderness to palpation without midline pain. Neurological: She is alert and oriented to person, place, and time. Distal motor and sensation intact   Skin: Skin is warm. No rash noted. She is not diaphoretic.    Psychiatric: She has a normal mood and affect. Her behavior is normal. Judgment and thought content normal.   Nursing note and vitals reviewed. Note written by Ten Talamantes. David Montana, as dictated by Linh Marie MD 1:56 PM       [facility]  ED Course   This is a 66-year-old female with a past medical history including degenerative joint disease, hypertension, presenting with complaints of acute onset left-sided back pain. She denies previous pain, states she is quite active, walking at least a mile every day. He states today she was in the kitchen, she suddenly experienced lower left back pain, 10 out of 10, nonradiating. She presents via EMS, unable to ambulated, or rise. Physical exam is remarkable for left sided paraspinal tenderness to palpation, still lower extremity motor, and sensation pulses intact, with normal reflexes. Subacute pain, is not typical of renal colic and stressed to patient, however the patient's age, neurologic fracture, versus renal pathology remain on the differential. Obtain a CT lumbar, following in control, and make a disposition based on the patient's diagnostics and response to therapy. Procedures    PROGRESS NOTE:  3:13 PM  Imaging reflects chronic lumbar disease with lumbar radiculopathy being likely diagnosis today. Pain has improved. Will send home with muscle relaxants.

## 2017-08-17 NOTE — DISCHARGE INSTRUCTIONS
Back Pain: Care Instructions  Your Care Instructions    Back pain has many possible causes. It is often related to problems with muscles and ligaments of the back. It may also be related to problems with the nerves, discs, or bones of the back. Moving, lifting, standing, sitting, or sleeping in an awkward way can strain the back. Sometimes you don't notice the injury until later. Arthritis is another common cause of back pain. Although it may hurt a lot, back pain usually improves on its own within several weeks. Most people recover in 12 weeks or less. Using good home treatment and being careful not to stress your back can help you feel better sooner. Follow-up care is a key part of your treatment and safety. Be sure to make and go to all appointments, and call your doctor if you are having problems. Its also a good idea to know your test results and keep a list of the medicines you take. How can you care for yourself at home? · Sit or lie in positions that are most comfortable and reduce your pain. Try one of these positions when you lie down:  ¨ Lie on your back with your knees bent and supported by large pillows. ¨ Lie on the floor with your legs on the seat of a sofa or chair. Jeffory Amass on your side with your knees and hips bent and a pillow between your legs. ¨ Lie on your stomach if it does not make pain worse. · Do not sit up in bed, and avoid soft couches and twisted positions. Bed rest can help relieve pain at first, but it delays healing. Avoid bed rest after the first day of back pain. · Change positions every 30 minutes. If you must sit for long periods of time, take breaks from sitting. Get up and walk around, or lie in a comfortable position. · Try using a heating pad on a low or medium setting for 15 to 20 minutes every 2 or 3 hours. Try a warm shower in place of one session with the heating pad. · You can also try an ice pack for 10 to 15 minutes every 2 to 3 hours.  Put a thin cloth between the ice pack and your skin. · Take pain medicines exactly as directed. ¨ If the doctor gave you a prescription medicine for pain, take it as prescribed. ¨ If you are not taking a prescription pain medicine, ask your doctor if you can take an over-the-counter medicine. · Take short walks several times a day. You can start with 5 to 10 minutes, 3 or 4 times a day, and work up to longer walks. Walk on level surfaces and avoid hills and stairs until your back is better. · Return to work and other activities as soon as you can. Continued rest without activity is usually not good for your back. · To prevent future back pain, do exercises to stretch and strengthen your back and stomach. Learn how to use good posture, safe lifting techniques, and proper body mechanics. When should you call for help? Call your doctor now or seek immediate medical care if:  · You have new or worsening numbness in your legs. · You have new or worsening weakness in your legs. (This could make it hard to stand up.)  · You lose control of your bladder or bowels. Watch closely for changes in your health, and be sure to contact your doctor if:  · Your pain gets worse. · You are not getting better after 2 weeks. Where can you learn more? Go to http://micha-johanna.info/. Enter W655 in the search box to learn more about \"Back Pain: Care Instructions. \"  Current as of: March 21, 2017  Content Version: 11.3  © 1055-7622 [employer]. Care instructions adapted under license by [employer] (which disclaims liability or warranty for this information). If you have questions about a medical condition or this instruction, always ask your healthcare professional. [address] any warranty or liability for your use of this information. We hope that we have addressed all of your medical concerns.  The examination and treatment you received in the Emergency Department were for an emergent problem and were not intended as complete care. It is important that you follow up with your healthcare provider(s) for ongoing care. If your symptoms worsen or do not improve as expected, and you are unable to reach your usual health care provider(s), you should return to the Emergency Department. Today's healthcare is undergoing tremendous change, and patient satisfaction surveys are one of the many tools to assess the quality of medical care. You may receive a survey from the "[employer]" regarding your experience in the Emergency Department. I hope that your experience has been completely positive, particularly the medical care that I provided. As such, please participate in the survey; anything less than excellent does not meet my expectations or intentions. [facility] and [facility] participate in nationally recognized quality of care measures. If your blood pressure is greater than 120/80, as reported below, we urge that you seek medical care to address the potential of high blood pressure, commonly known as hypertension. Hypertension can be hereditary or can be caused by certain medical conditions, pain, stress, or \"white coat syndrome. \"       Please make an appointment with your health care provider(s) for follow up of your Emergency Department visit. VITALS:   Patient Vitals for the past 8 hrs:   Temp Pulse Resp BP SpO2   08/17/17 1339 97.9 °F (36.6 °C) (!) 53 18 147/76 99 %          Thank you for allowing us to provide you with medical care today. We realize that you have many choices for your emergency care needs. Please choose us in the future for any continued health care needs. Kris Gupta [address] [place]: [phone]            No results found for this or any previous visit (from the past 24 hour(s)).     Ct Spine Lumb Wo Cont    Result Date: 8/17/2017  Clinical indication: Acute onset paraspinal pain. Axial CT scan of the lumbar sacral spine obtained without contrast with coronal and sagittal reconstructions. Comparison to thousand 14. CT dose reduction was achieved through the use of a standardized protocol tailored for this examination and automatic exposure control for dose modulation . Nadir Oxford There is a severe dextroscoliosis of. There is severe multilevel chronic disc degeneration. Multilevel severe spondylosis with facet hypertrophy once again demonstrated. Foraminal and canal stenosis seen at multiple level. There is however no fracture or focal lytic lesion. T11-T12 shows some canal stenosis. T12-L1 show also some mild canal stenosis. L1-L2, L2-L3 and L3-L4 shows some canal stenosis with bilateral foraminal stenosis or. Of impression: No acute findings. Severe scoliosis and spondylosis resulting in multilevel spinal stenosis.

## 2017-08-17 NOTE — ED NOTES
Patient verbalizes understanding of discharge instructions. Pt alert and oriented, appears in no acute distress, respirations equal and unlabored. Patient wheeled to discharge to family.

## 2017-08-17 NOTE — SENIOR SERVICES NOTE
physical Therapy Emergency Department EVALUATION/DISCHARGE  Patient: Roe Lipoma (10 y.o. female)  Date: 8/17/2017  Primary Diagnosis: There are no admission diagnoses documented for this encounter. Precautions:      ASSESSMENT :  Chart reviewed. Patient cleared to be seen by MD and nurse. Patient presenting to ED c/o of left low back with movement. Patient did not fall. Lumbar CT negative. Suspect muscle spasm vs nerve compression. No weakness observed in extremities. No numbness or paraesthesias reported. Bowel and bladder WFL. Patient presenting with some confusion asking her daughter multiple times \"What did I do?! Did I fall? \". Daughter reports that this is patient's baseline. Patient is very anxious and fearful of the pain. Pt needing mod assist supine to sit EOB and a significant amount of additional time. Patient calling out and crying during task, stating \"it's like a knife stabbing me! \" Patient calmed and reassured. Educated family on log rolling technique. After 10 minutes, patient able to sit safely and securely on the EOB with minimal pain. Strength generally decreased but equal bilat LEs. Pt sit to stand with min assist and RW. Patient does not usually use a RW at home, however she has one from previous hospital admissions. In standing, patient demonstrating increased bilat knee flexion, increased trunk flexion and decreased stance time on the left. However with facilitated weight shift and reassurance, patient able to take steps without buckling or pain. Suspect that patient's fear is biggest limiting factor to mobility at this time. With encouragement, patient ambulating x 60 feet with RW, CGA. Gait normalizing with progression of steps. No pain reported with gait training. Most pain seen during bed mobility and this therapist is concerned about patient becoming deconditioned at home.  Family educated on the importance of patient getting out bed and continuing to ambulate in a safe environment. Recommend HHPT to follow up to progress mobility and wean off the RW. Four family members are to assist patient into the home today and get her set up. Patient has 24 hour supervision/assist from family members as needed. Recommend home with family members who will assist on 3 steps to enter the house. HHPT for continued mobilization and prevent functional decline at home. PLAN :  Discharge Recommendations:     [x]   Home with family, HHPT to follow up in the AM  []   Skilled nursing facility  []   Admission to hospital with rehab likely needed  []   Inpatient rehab referral  []   Outpatient physical therapy referral  []   Other:    Further Equipment Recommendations for Discharge: none, has RW at home as needed  []   Rolling walker with 5\" wheels  []   Crutches   []   [address]   []   Wheelchair   []   Other:     COMMUNICATION/EDUCATION:   Communication/Collaboration:  [x]   Fall prevention education was provided and the patient/caregiver indicated understanding. [x]   Patient/family have participated as able and agree with findings and recommendations. []   Patient is unable to participate in plan of care at this time. Findings and recommendations were discussed with: MD physician and Registered Nurse and        SUBJECTIVE:   Patient stated What did I do? Oh did I fall? Marci Neal    OBJECTIVE DATA SUMMARY:   HISTORY:    Past Medical History:   Diagnosis Date    DJD (degenerative joint disease)     Hematuria, microscopic     Hypertension     Memory loss     Menopause     Osteopenia      Past Surgical History:   Procedure Laterality Date    HX HYSTERECTOMY       Prior Level of Function/Home Situation: Pt lives with son. Will have 3-4 family members present with her upon discharge. Previously patient ambulating independently without device. She like to \"take walks with her son\" in the afternoons.    Personal factors and/or comorbidities impacting plan of care:     [address] [place] [address] Environment: Private residence  # Steps to Enter: 3  Rails to Enter: Yes  Hand Rails : Bilateral  Wheelchair Ramp: No  One/Two Story Residence: One story  Living Alone: No  Support Systems: Child(rain), Family member(s)  Patient Expects to be Discharged to[de-identified] Private residence  Current DME Used/Available at Home: Walker, rolling, Cane, straight    EXAMINATION/PRESENTATION/DECISION MAKING:   Hearing: Auditory  Auditory Impairment: None     Strength:    Strength: Generally decreased, functional     Tone & Sensation:   Tone: Normal   Sensation: Intact      Coordination:  Coordination: Within functional limits  Functional Mobility:  Bed Mobility:  Supine to Sit: Moderate assistance;Assist x1     Transfers:  Sit to Stand: Minimum assistance;Assist x1;Additional time; Adaptive equipment  Stand to Sit: Minimum assistance;Assist x1     Balance:   Sitting: Intact  Standing: Intact; With support  Ambulation/Gait Training:  Distance (ft): 60 Feet (ft)  Assistive Device: Gait belt;Walker, rolling  Ambulation - Level of Assistance: Contact guard assistance  Gait Abnormalities: Antalgic;Decreased step clearance  Base of Support: Narrowed  Stance: Left decreased (resolving with progression of gait)  Speed/Celia: Slow  Step Length: Right shortened;Left shortened    Special Tests:  10 Meter walk test:  (Specify if any supplemental oxygen is used, the type, pre, during and post sats.)    Self-Selected Or Fast-Velocity: Self Selected Velocity  Trial 1: 45 Seconds  Trial 2: 45 Seconds  Trial 3: 45 Seconds  Average: 45  Score: [id number]             Walking Speed (m/s)  Modifier Scale Age 52-63 Age 61-76 Age 66-77 Age 80-80    Male Female Male Female Male Female Male Female   CH   0% Impaired ?  1.39 ? 1.40 ? 1.36 ? 1.30 ? 1.33 ? 1.27 ? 1.21 ? 1.15   CI   1-19% Impaired 1.11-1.38 1.12-1.39 1.09-1.35 1.04-1.29 1.06-1.32 1.01-1.26 0.96-1.20 0.92-1.14   CJ   20-39% Impaired 0.83-1.10 0.84-1.11 0.82-1.08 0.78-1.03 0.80-1.05 0.76-1.00 0. 72-0.95 0.69-0.91   CK   40-59% Impaired 0.56-0.82 0.57-0.83 0.54-0.81 0.52-0.77 0.53-0.79 0.51-0.75 0.48-0.71 0.46-0.68   CL   60-79% Impaired 0.28-0.55 0.28-0.56 0.27-0.53 0.26-0.51 0.27-0.52 0.25-0.50 0.24-0.49 0.23-0.45   CM   80-99% Impaired 0.01-0.28 < 0.01-0.28 < 0.01-0.27 < 0.01-0.26 0.01-0.27 0.01-0.24 0.01-0.23 0.01-0.22   CN   100% Impaired Cannot Perform   Minimal Detectable Change (MDC-90) = 0.1 m/s  Deonna RIBEIRO. \"Comfortable and maximum walking speed of adults aged 20-79 years: reference values and determinants. \" Age and Agin Volume 26(1):15-9. Tiffani Kelly. \"Age- and gender-related test performance in community-dwelling elderly people: Six-Minute Walk Test, Singh Balance Scale, Timed Up & Go Test, and gait speeds. \" Physical Therapy: 2002 Volume 82(2):128-37. Shanel BENOIT, Carly GAGNON, Derrick LANDRYD, Lamine SUAZO. \"Assessing stability and change of four performance measures: a longitudinal study evaluating outcome following total hip and knee arthroplasty. \" [facility] Musculoskeletal Disorders: 2005 Volume 6(3). Alejandro Pina, PhD; Naida Giles, PhD. Keeley Main Paper: \"Walking Speed: the Sixth Vital Sign\" Journal of Geriatric Physical Therapy: 2009 - Volume 32 - Issue 2 - p 25 . In compliance with CMSs Claims Based Outcome Reporting, the following G-code set was chosen for this patient based on their primary functional limitation being treated: The outcome measure chosen to determine the severity of the functional limitation was the 10 MWT with a score of 0.1 m/s which was correlated with the impairment scale.     ? Mobility - Walking and Moving Around:     - CURRENT STATUS: CM - 80%-99% impaired, limited or restricted    - GOAL STATUS: CM - 80%-99% impaired, limited or restricted    - D/C STATUS:  CM - 80%-99% impaired, limited or restricted          Pain:Pain Scale 1: Numeric (0 - 10)  Pain Intensity 1: 10  Pain Location 1: Back  Pain Orientation 1: Left; Lower  Pain Description 1: Sharp     Activity Tolerance:   Please refer to the flowsheet for vital signs taken during this treatment.   After treatment:   [x]         Patient left in no apparent distress sitting up in chair  []         Patient left in no apparent distress in bed  [x]         Call bell left within reach  [x]         Nursing notified  [x]         Caregiver present  []         Bed alarm activated        Thank you for this referral.  Nathalie Mitchell PT, DPT   Time Calculation: 35 mins

## 2017-08-17 NOTE — PROGRESS NOTES
[facility]. EMR Reviewed. History significant for osteopenia, DJD, and HTN . Patient presents w/ back pain. Met w/patient daughter Edelmira Huber and sons Hank Lau and Faby Morales. Introduced to role of CM. Goff Rom is POA. Sly Loepz lives w/ patient however has dementia. Family is supportive and available. Discussed HH - previous agency [facility]. POA would like to have referral sent back to agency. Electronic order placed in CC. Informed family could be 1-2 days before start of care. POA acknowledged and family be available for safety. Family inquired about stretcher transport. Patient arrived by [facility]. History noted back pain, decreased mobility, AMS and fall risk - discussed w/ POA co-pays and fees may apply - if patient does not meet medical necessity. Updates shared w/ Dr. Kwaku Eubanks. Referral sent via [employer] to [facility] provided available at 1730. Spoke w/ MoniqueRN informed CM family would like to reconsider and updates received from [facility]/PT. Patient can travel by car and have assistance in/out of vehicle. Hank Lau will have his son to assist him w/ getting patient into home - will have 2 steps to enter. Call placed back to [facility] spoke w/ Carissa Foot - trip cancelled. [facility] and [employer] are insurance providers. No additional transitional care needs verbalized at this time. Care Management Interventions  PCP Verified by CM:  Yes  Last Visit to PCP: 07/25/17  Mode of Transport at Discharge: [address]  [address] Time of Discharge: Syed Graham (CM Consult): [address]: Yes  [place]yt Signup: No  Discharge Durable Medical Equipment: No  Physical Therapy Consult: Yes  Occupational Therapy Consult: No  Speech Therapy Consult: No

## 2017-08-17 NOTE — ED TRIAGE NOTES
Triage note: pt arrives from home for lower back pain. Pt denies falling. Pt has hx of lower back pain. Pt states she walks at least 1 mile a day. Pt denies any recent injury.

## 2017-08-17 NOTE — SENIOR SERVICES NOTE
[address]  Face to Face Encounter    Patients Name: Fernando Mccullough    YOB: 1925    Primary Diagnosis: LBP, back spasm    Date of Face to Face:   8/17/17                                  Face to Face Encounter findings are related to primary reason for home care:   yes    1. I certify that the patient needs intermittent skilled nursing care, physical therapy and/or speech therapy. I will be following this patient in the Community and will be responsible for reviewing and signing the [address] 2. Initial Orders for Care: Must be completed only if Face to Face MD will not be signing the [address] Physical Therapy    3. I certify that this patient is homebound for the following reason(s): Requires considerable and taxing effort to leave the home     4. I certify that this patient is under my care and that I, or a nurse practitioner or  [id number] working with me, had a Face-to-Face Encounter that meets the physician Face-to-Face Encounter requirements. Document the physical findings from the [address] Encounter that support the need for skilled services: Has new finding of weakness and altered mobility that requires skilled physical/occupational and/or speech therapy services for evaluation and interventions.      Sherly Cameron  8/17/2017

## 2017-08-18 ENCOUNTER — HOME CARE VISIT (OUTPATIENT)
Dept: SCHEDULING | Facility: HOME HEALTH | Age: 82
End: 2017-08-18

## 2017-08-18 ENCOUNTER — HOME HEALTH ADMISSION (OUTPATIENT)
Dept: HOME HEALTH SERVICES | Facility: HOME HEALTH | Age: 82
End: 2017-08-18

## 2017-08-18 ENCOUNTER — HOME CARE VISIT (OUTPATIENT)
Dept: HOME HEALTH SERVICES | Facility: HOME HEALTH | Age: 82
End: 2017-08-18

## 2017-08-18 NOTE — SENIOR SERVICES NOTE
Case accepted by [facility]. Contact made w/ POA informed has communicated w/ agency and patient is improving. No transitional care needs verbalized at this time. Family will continue support.

## 2017-08-22 ENCOUNTER — PATIENT OUTREACH (OUTPATIENT)
Dept: INTERNAL MEDICINE CLINIC | Age: 82
End: 2017-08-22

## 2017-08-22 NOTE — PROGRESS NOTES
[facility] Follow Up for [facility] ED Visit on 8/18/17 for GLF    Called son, Adeola Orellana.  Verified pt with 2 identifiers. Said she has been having good days and bad days as far as her back pain goes. Yesterday she said she felt better than she did prior to her falling. Adeola Orellana will call his mom this morning to see how she is doing and determine whether or not she wants PT. He will call me back to let me know. Pt states she was in her kitchen this afternoon when she had sudden onset of stabbing pain in her L-lower back pain. Pt denies radiation of the pain. Pt denies recent falls or trauma. Pt states she is normally very active and took a long walk yesterday. Pt states she felt fine this morning with no symptoms. Pt denies having a history of back problems or kidney stones. Pt denies having dysuria or hematuria. There are no other acute medical concerns at this time. CT Lumbar Spine IMPRESSION:  There is however no fracture or focal lytic lesion. T11-T12 shows some canal stenosis. T12-L1 show also some mild canal stenosis. L1-L2, L2-L3 and L3-L4 shows some  canal stenosis with bilateral foraminal stenosis. ED Prescriptions   Medication Sig Dispense Start Date End Date Auth. Provider   cyclobenzaprine (FLEXERIL) 10 mg tablet Take 1 Tab by mouth three (3) times daily as needed for Muscle Spasm(s) for up to 7 days.  21 Tab 8/17/2017 8/24/2017 Abhay Saenz MD

## 2017-08-22 NOTE — PROGRESS NOTES
Received call back from Tracy. Says his mom is doing well. They were on way to [employer]. At this time she is declining HH. Told him to please call if anything changes. Called [facility] to let Kari know.

## 2017-09-02 RX ORDER — MEMANTINE HYDROCHLORIDE 10 MG/1
TABLET ORAL
Qty: 60 TAB | Refills: 11 | Status: SHIPPED | OUTPATIENT
Start: 2017-09-02 | End: 2018-02-20 | Stop reason: SDUPTHER

## 2017-10-17 ENCOUNTER — OFFICE VISIT (OUTPATIENT)
Dept: INTERNAL MEDICINE CLINIC | Age: 82
End: 2017-10-17

## 2017-10-17 VITALS
RESPIRATION RATE: 16 BRPM | TEMPERATURE: 97.5 F | WEIGHT: 134.8 LBS | DIASTOLIC BLOOD PRESSURE: 72 MMHG | OXYGEN SATURATION: 97 % | HEIGHT: 63 IN | BODY MASS INDEX: 23.88 KG/M2 | HEART RATE: 60 BPM | SYSTOLIC BLOOD PRESSURE: 120 MMHG

## 2017-10-17 DIAGNOSIS — R41.3 MEMORY LOSS: ICD-10-CM

## 2017-10-17 DIAGNOSIS — Z23 ENCOUNTER FOR IMMUNIZATION: ICD-10-CM

## 2017-10-17 DIAGNOSIS — R73.09 OTHER ABNORMAL GLUCOSE: ICD-10-CM

## 2017-10-17 DIAGNOSIS — N39.0 RECURRENT UTI: ICD-10-CM

## 2017-10-17 DIAGNOSIS — I10 ESSENTIAL HYPERTENSION, BENIGN: Primary | ICD-10-CM

## 2017-10-17 DIAGNOSIS — R05.8 ALLERGIC COUGH: ICD-10-CM

## 2017-10-17 RX ORDER — ASPIRIN 81 MG/1
81 TABLET ORAL DAILY
COMMUNITY
End: 2019-07-08 | Stop reason: ALTCHOICE

## 2017-10-17 NOTE — PROGRESS NOTES
HISTORY OF PRESENT ILLNESS  Rosina Dillon is a 80 y.o. female. LOUISA Alejo is seen today for follow up of hypertension and other concerns. She is accompanied by her daughter Gunjan Calix. Hypertension. Stable on current regimen. Memory loss. Somewhat progressive, but she is on medications. She is still keeping up with her ADLs. IFG, elevated RBC. She is due for lab recheck. Review of systems notable for an allergic cough. Gunjan Calix wonders about a trial of Zyrtec which I think is fine and they will let me know if this persists. Social history is notable for a bit of a tenuous home situation. She lives with her son Marily Ruelas who has significant memory problems himself. Family members including Gunjan Calix keep a close eye on the situation but at some point they will need to be removed to another setting. MedDATA/gwo         Review of Systems   Constitutional: Negative for weight loss. Respiratory: Positive for cough. Cardiovascular: Negative for chest pain, palpitations, leg swelling and PND. Musculoskeletal: Negative for myalgias. Neurological: Negative for focal weakness. Psychiatric/Behavioral: Positive for memory loss. Physical Exam   Constitutional: She appears well-nourished. Neck: Carotid bruit is not present. Cardiovascular: Normal rate and regular rhythm. Exam reveals no gallop and no friction rub. No murmur heard. Pulmonary/Chest: Effort normal and breath sounds normal. No respiratory distress. Musculoskeletal: She exhibits no edema. Nursing note and vitals reviewed. ASSESSMENT and PLAN  Diagnoses and all orders for this visit:    1. Essential hypertension, benign  -     URINALYSIS W/ RFLX MICROSCOPIC  -     CBC WITH AUTOMATED DIFF  -     METABOLIC PANEL, COMPREHENSIVE    2. Encounter for immunization  -     ADMIN INFLUENZA VIRUS VAC  -     INFLUENZA VIRUS VACCINE, HIGH DOSE SEASONAL, PRESERVATIVE FREE    3. Other abnormal glucose  -     HEMOGLOBIN A1C WITH EAG    4.  Memory loss- Continue current regimen of prescription and / or OTC medications     5. Allergic cough- see hpi  -   6.  Recurrent UTI  -     CULTURE, URINE    Plan was reviewed with patient and family, understanding expressed

## 2017-10-17 NOTE — MR AVS SNAPSHOT
Visit Information Date & Time Provider Department Dept. Phone Encounter #  
 10/17/2017  3:25 PM Anna Clarke, [address] [facility] [phone] [id number] Follow-up Instructions Return in about 6 months (around 4/17/2018). Upcoming Health Maintenance Date Due  
 GLAUCOMA SCREENING Q2Y 5/21/1990 MEDICARE YEARLY EXAM 5/21/1990 INFLUENZA AGE 9 TO ADULT 8/1/2017 DTaP/Tdap/Td series (2 - Td) 4/6/2027 Allergies as of 10/17/2017  Review Complete On: 10/17/2017 By: Anna Clarke MD  
  
 Severity Noted Reaction Type Reactions Cephalexin  11/21/2009    Nausea Only Macrobid [Nitrofurantoin Monohyd/m-cryst]  11/21/2009    Myalgia Razadyne [Galantamine]  11/21/2009    Nausea Only Sulfa (Sulfonamide Antibiotics)  11/21/2009    Rash Current Immunizations  Reviewed on 10/17/2017 Name Date Influenza High Dose Vaccine PF 10/17/2017, 1/1/2017, 11/24/2015 Influenza Vaccine Split 10/10/2010 PPD 8/23/2011 Pneumococcal Conjugate (PCV-13) 3/28/2017 Td 5/12/2013 Tdap 4/6/2017 ZZZ-RETIRED (DO NOT USE) Pneumococcal Vaccine (Unspecified Type) 10/27/2005 Zoster 6/1/2007 Reviewed by Tae Serrano LPN on 42/19/5582 at  4:02 PM  
You Were Diagnosed With   
  
 Codes Comments Essential hypertension, benign    -  Primary ICD-10-CM: I10 
ICD-9-CM: 401.1 Encounter for immunization     ICD-10-CM: I12 ICD-9-CM: V03.89 Other abnormal glucose     ICD-10-CM: R73.09 
ICD-9-CM: 790.29 Memory loss     ICD-10-CM: R41.3 ICD-9-CM: 780.93 Allergic cough     ICD-10-CM: R05 ICD-9-CM: 786.2 Recurrent UTI     ICD-10-CM: N39.0 ICD-9-CM: 599.0 Vitals BP Pulse Temp Resp Height(growth percentile) Weight(growth percentile) 120/72 (BP 1 Location: Right arm, BP Patient Position: Sitting) 60 97.5 °F (36.4 °C) (Oral) 16 5' 3\" (1.6 m) 134 lb 12.8 oz (61.1 kg) SpO2 BMI OB Status Smoking Status 97% 23.88 kg/m2 Postmenopausal Never Smoker BMI and BSA Data Body Mass Index Body Surface Area  
 23.88 kg/m 2 1.65 m 2 Preferred Pharmacy Pharmacy Name Phone [id number] Paulino, [address] [phone] Your Updated Medication List  
  
   
This list is accurate as of: 10/17/17  4:11 PM.  Always use your most recent med list.  
  
  
  
  
 aspirin delayed-release 81 mg tablet Take  by mouth daily. atenolol 25 mg tablet Commonly known as:  TENORMIN  
TAKE 1 TABLET BY MOUTH EVERY MORNING  
  
 donepezil 10 mg tablet Commonly known as:  ARICEPT  
TAKE 1 TAB BY MOUTH DAILY. hydroCHLOROthiazide 12.5 mg capsule Commonly known as:  Quintella Antes TAKE ONE CAPSULE BY MOUTH EVERY MORNING  
  
 KLOR-CON 8 8 mEq tablet Generic drug:  potassium chloride SR  
TAKE 1 TABLET BY MOUTH EVERY DAY  
  
 * memantine 10 mg tablet Commonly known as:  Pako Borrow TAKE 1 TABLET BY MOUTH TWICE A DAY  
  
 * memantine 10 mg tablet Commonly known as:  Hooper Borrow TAKE 1 TABLET BY MOUTH TWICE A DAY  
  
 multivitamin tablet Commonly known as:  ONE A DAY Take 1 Tab by mouth daily. * Notice: This list has 2 medication(s) that are the same as other medications prescribed for you. Read the directions carefully, and ask your doctor or other care provider to review them with you. We Performed the Following ADMIN INFLUENZA VIRUS VAC [ HCP] CBC WITH AUTOMATED DIFF [41237 CPT(R)] CULTURE, URINE [id number] CPT(R)] HEMOGLOBIN A1C WITH EAG [00920 CPT(R)] INFLUENZA VIRUS VACCINE, HIGH DOSE SEASONAL, PRESERVATIVE FREE [40422 CPT(R)] METABOLIC PANEL, COMPREHENSIVE [99043 CPT(R)] URINALYSIS W/ RFLX MICROSCOPIC [98011 CPT(R)] Follow-up Instructions Return in about 6 months (around 4/17/2018). Introducing Franklin Hunt!    
 Mumtaz Stroud introduces [employer] patient portal. Now you can access parts of your medical record, email your doctor's office, and request medication refills online. 1. In your internet browser, go to [email or web address] 2. Click on the First Time User? Click Here link in the Sign In box. You will see the New Member Sign Up page. 3. Enter your [employer] Access Code exactly as it appears below. You will not need to use this code after youve completed the sign-up process. If you do not sign up before the expiration date, you must request a new code. · [employer] Access Code: [email or web address] Expires: 10/23/2017 11:38 AM 
 
4. Enter the last four digits of your Social Security Number (xxxx) and Date of Birth (mm/dd/yyyy) as indicated and click Submit. You will be taken to the next sign-up page. 5. Create a Velotton ID. This will be your [employer]otton login ID and cannot be changed, so think of one that is secure and easy to remember. 6. Create a [employer] password. You can change your password at any time. 7. Enter your Password Reset Question and Answer. This can be used at a later time if you forget your password. 8. Enter your e-mail address. You will receive e-mail notification when new information is available in [address] 9. Click Sign Up. You can now view and download portions of your medical record. 10. Click the Download Summary menu link to download a portable copy of your medical information. If you have questions, please visit the Frequently Asked Questions section of the [employer] website. Remember, [employer] is NOT to be used for urgent needs. For medical emergencies, dial 911. Now available from your iPhone and Android! Please provide this summary of care documentation to your next provider. Your primary care clinician is listed as SANDER TRUONG. If you have any questions after today's visit, please call [phone].

## 2017-10-18 ENCOUNTER — HOSPITAL ENCOUNTER (OUTPATIENT)
Dept: LAB | Age: 82
Discharge: HOME | End: 2017-10-18
Payer: MEDICARE

## 2017-10-18 PROCEDURE — 36415 COLL VENOUS BLD VENIPUNCTURE: CPT

## 2017-10-18 PROCEDURE — 83036 HEMOGLOBIN GLYCOSYLATED A1C: CPT

## 2017-10-18 PROCEDURE — 85025 COMPLETE CBC W/AUTO DIFF WBC: CPT

## 2017-10-18 PROCEDURE — 81001 URINALYSIS AUTO W/SCOPE: CPT

## 2017-10-18 PROCEDURE — 80053 COMPREHEN METABOLIC PANEL: CPT

## 2017-10-18 PROCEDURE — 87086 URINE CULTURE/COLONY COUNT: CPT

## 2017-10-19 LAB
ALBUMIN SERPL-MCNC: 4.2 G/DL (ref 3.2–4.6)
ALBUMIN/GLOB SERPL: 2 {RATIO} (ref 1.2–2.2)
ALP SERPL-CCNC: 59 IU/L (ref 39–117)
ALT SERPL-CCNC: 13 IU/L (ref 0–32)
APPEARANCE UR: ABNORMAL
AST SERPL-CCNC: 15 IU/L (ref 0–40)
BACTERIA #/AREA URNS HPF: ABNORMAL /[HPF]
BACTERIA UR CULT: NORMAL
BASOPHILS # BLD AUTO: 0.1 X10E3/UL (ref 0–0.2)
BASOPHILS NFR BLD AUTO: 1 %
BILIRUB SERPL-MCNC: 0.4 MG/DL (ref 0–1.2)
BILIRUB UR QL STRIP: NEGATIVE
BUN SERPL-MCNC: 20 MG/DL (ref 10–36)
BUN/CREAT SERPL: 26 (ref 12–28)
CALCIUM SERPL-MCNC: 9.2 MG/DL (ref 8.7–10.3)
CASTS URNS QL MICRO: ABNORMAL /LPF
CHLORIDE SERPL-SCNC: 103 MMOL/L (ref 96–106)
CO2 SERPL-SCNC: 24 MMOL/L (ref 18–29)
COLOR UR: YELLOW
CREAT SERPL-MCNC: 0.76 MG/DL (ref 0.57–1)
EOSINOPHIL # BLD AUTO: 0.1 X10E3/UL (ref 0–0.4)
EOSINOPHIL NFR BLD AUTO: 2 %
EPI CELLS #/AREA URNS HPF: ABNORMAL /HPF
ERYTHROCYTE [DISTWIDTH] IN BLOOD BY AUTOMATED COUNT: 14.9 % (ref 12.3–15.4)
EST. AVERAGE GLUCOSE BLD GHB EST-MCNC: 100 MG/DL
GLOBULIN SER CALC-MCNC: 2.1 G/DL (ref 1.5–4.5)
GLUCOSE SERPL-MCNC: 105 MG/DL (ref 65–99)
GLUCOSE UR QL: NEGATIVE
HBA1C MFR BLD: 5.1 % (ref 4.8–5.6)
HCT VFR BLD AUTO: 44.2 % (ref 34–46.6)
HGB BLD-MCNC: 14.8 G/DL (ref 11.1–15.9)
HGB UR QL STRIP: ABNORMAL
IMM GRANULOCYTES # BLD: 0 X10E3/UL (ref 0–0.1)
IMM GRANULOCYTES NFR BLD: 0 %
KETONES UR QL STRIP: NEGATIVE
LEUKOCYTE ESTERASE UR QL STRIP: ABNORMAL
LYMPHOCYTES # BLD AUTO: 0.9 X10E3/UL (ref 0.7–3.1)
LYMPHOCYTES NFR BLD AUTO: 13 %
MCH RBC QN AUTO: 30.7 PG (ref 26.6–33)
MCHC RBC AUTO-ENTMCNC: 33.5 G/DL (ref 31.5–35.7)
MCV RBC AUTO: 92 FL (ref 79–97)
MICRO URNS: ABNORMAL
MONOCYTES # BLD AUTO: 0.7 X10E3/UL (ref 0.1–0.9)
MONOCYTES NFR BLD AUTO: 10 %
MUCOUS THREADS URNS QL MICRO: PRESENT
NEUTROPHILS # BLD AUTO: 5.2 X10E3/UL (ref 1.4–7)
NEUTROPHILS NFR BLD AUTO: 74 %
NITRITE UR QL STRIP: NEGATIVE
PH UR STRIP: 6.5 [PH] (ref 5–7.5)
PLATELET # BLD AUTO: 204 X10E3/UL (ref 150–379)
POTASSIUM SERPL-SCNC: 4.1 MMOL/L (ref 3.5–5.2)
PROT SERPL-MCNC: 6.3 G/DL (ref 6–8.5)
PROT UR QL STRIP: ABNORMAL
RBC # BLD AUTO: 4.82 X10E6/UL (ref 3.77–5.28)
RBC #/AREA URNS HPF: ABNORMAL /HPF
SODIUM SERPL-SCNC: 144 MMOL/L (ref 134–144)
SP GR UR: 1.03 (ref 1–1.03)
UROBILINOGEN UR STRIP-MCNC: 0.2 MG/DL (ref 0.2–1)
WBC # BLD AUTO: 7 X10E3/UL (ref 3.4–10.8)
WBC #/AREA URNS HPF: >30 /HPF

## 2018-02-20 ENCOUNTER — HOSPITAL ENCOUNTER (OUTPATIENT)
Dept: LAB | Age: 83
Discharge: HOME | End: 2018-02-20
Payer: MEDICARE

## 2018-02-20 ENCOUNTER — OFFICE VISIT (OUTPATIENT)
Dept: INTERNAL MEDICINE CLINIC | Age: 83
End: 2018-02-20

## 2018-02-20 VITALS
SYSTOLIC BLOOD PRESSURE: 150 MMHG | HEIGHT: 61 IN | OXYGEN SATURATION: 95 % | BODY MASS INDEX: 25 KG/M2 | DIASTOLIC BLOOD PRESSURE: 81 MMHG | TEMPERATURE: 97.8 F | HEART RATE: 64 BPM | WEIGHT: 132.4 LBS

## 2018-02-20 DIAGNOSIS — R45.1 AGITATION: ICD-10-CM

## 2018-02-20 DIAGNOSIS — F03.B18 MODERATE DEMENTIA WITH BEHAVIORAL DISTURBANCE: ICD-10-CM

## 2018-02-20 DIAGNOSIS — N39.0 RECURRENT UTI: ICD-10-CM

## 2018-02-20 DIAGNOSIS — F51.01 PRIMARY INSOMNIA: Primary | ICD-10-CM

## 2018-02-20 PROCEDURE — 87088 URINE BACTERIA CULTURE: CPT

## 2018-02-20 PROCEDURE — 80053 COMPREHEN METABOLIC PANEL: CPT

## 2018-02-20 PROCEDURE — 81001 URINALYSIS AUTO W/SCOPE: CPT

## 2018-02-20 PROCEDURE — 85025 COMPLETE CBC W/AUTO DIFF WBC: CPT

## 2018-02-20 PROCEDURE — 36415 COLL VENOUS BLD VENIPUNCTURE: CPT

## 2018-02-20 RX ORDER — QUETIAPINE FUMARATE 25 MG/1
12.5 TABLET, FILM COATED ORAL
Qty: 15 TAB | Refills: 11 | Status: SHIPPED | OUTPATIENT
Start: 2018-02-20 | End: 2018-05-11 | Stop reason: SDUPTHER

## 2018-02-20 NOTE — MR AVS SNAPSHOT
[address] [address] 
[phone] Patient: Haley Gutierres MRN:  [id number] Visit Information Date & Time Provider Department Dept. Phone Encounter #  
 2/20/2018  3:45 PM Rajeev Person, [facility] Internal Medicine [phone] [id number] Follow-up Instructions Return in about 1 month (around 3/20/2018). Upcoming Health Maintenance Date Due  
 GLAUCOMA SCREENING Q2Y 5/21/1990 MEDICARE YEARLY EXAM 5/21/1990 DTaP/Tdap/Td series (2 - Td) 4/6/2027 Allergies as of 2/20/2018  Review Complete On: 2/20/2018 By: Rajeev Person MD  
  
 Severity Noted Reaction Type Reactions Cephalexin  11/21/2009    Nausea Only Macrobid [Nitrofurantoin Monohyd/m-cryst]  11/21/2009    Myalgia Razadyne [Galantamine]  11/21/2009    Nausea Only Sulfa (Sulfonamide Antibiotics)  11/21/2009    Rash Current Immunizations  Reviewed on 10/17/2017 Name Date Influenza High Dose Vaccine PF 10/17/2017, 1/1/2017, 11/24/2015 Influenza Vaccine Split 10/10/2010 PPD 8/23/2011 Pneumococcal Conjugate (PCV-13) 3/28/2017 Td 5/12/2013 Tdap 4/6/2017 ZZZ-RETIRED (DO NOT USE) Pneumococcal Vaccine (Unspecified Type) 10/27/2005 Zoster 6/1/2007 Not reviewed this visit You Were Diagnosed With   
  
 Codes Comments Primary insomnia    -  Primary ICD-10-CM: F51.01 
ICD-9-CM: 307.42 Agitation     ICD-10-CM: R45.1 ICD-9-CM: 307.9 Moderate dementia with behavioral disturbance     ICD-10-CM: F03.91 
ICD-9-CM: 294.21 Recurrent UTI     ICD-10-CM: N39.0 ICD-9-CM: 599.0 Vitals BP Pulse Temp Height(growth percentile) Weight(growth percentile) SpO2  
 150/81 (BP 1 Location: Right arm, BP Patient Position: Sitting) 64 97.8 °F (36.6 °C) (Oral) 5' 1\" (1.549 m) 132 lb 6.4 oz (60.1 kg) 95% BMI OB Status Smoking Status 25.02 kg/m2 Postmenopausal Never Smoker BMI and BSA Data Body Mass Index Body Surface Area 25.02 kg/m 2 1.61 m 2 Preferred Pharmacy Pharmacy Name Phone [facility]/PHARMACY #[id number] Roa, [address] [phone] Your Updated Medication List  
  
   
This list is accurate as of: 2/20/18  4:45 PM.  Always use your most recent med list.  
  
  
  
  
 aspirin delayed-release 81 mg tablet Take  by mouth daily. atenolol 25 mg tablet Commonly known as:  TENORMIN  
TAKE 1 TABLET BY MOUTH EVERY MORNING  
  
 hydroCHLOROthiazide 12.5 mg capsule Commonly known as:  Latia Ali TAKE ONE CAPSULE BY MOUTH EVERY MORNING  
  
 KLOR-CON 8 8 mEq tablet Generic drug:  potassium chloride SR  
TAKE 1 TABLET BY MOUTH EVERY DAY  
  
 memantine 10 mg tablet Commonly known as:  [place] TAKE 1 TABLET BY MOUTH TWICE A DAY  
  
 multivitamin tablet Commonly known as:  ONE A DAY Take 1 Tab by mouth daily. QUEtiapine 25 mg tablet Commonly known as:  SEROquel Take 0.5 Tabs by mouth nightly. Prescriptions Sent to Pharmacy Refills QUEtiapine (SEROQUEL) 25 mg tablet 11 Sig: Take 0.5 Tabs by mouth nightly. Class: Normal  
 Pharmacy: [facility]/pharmacy #[id number], [facility] Ph #: [phone] Route: Oral  
  
We Performed the Following CBC WITH AUTOMATED DIFF [71030 CPT(R)] CULTURE, URINE [id number] CPT(R)] METABOLIC PANEL, COMPREHENSIVE [57826 CPT(R)] REFERRAL TO PSYCHIATRY [REF91 Custom] URINALYSIS W/ RFLX MICROSCOPIC [97053 CPT(R)] Follow-up Instructions Return in about 1 month (around 3/20/2018). Referral Information Referral ID Referred By Referred To  
  
 [id number] Selina TRUONG MD   
   [address] [place] [address] [address] [place] [address] Phone: [phone] Fax: [phone] Visits Status Start Date End Date 1 New Request 2/20/18 2/20/19 If your referral has a status of pending review or denied, additional information will be sent to support the outcome of this decision. Introducing [facility] SERVICES! Sarthak Flores introduces [employer] patient portal. Now you can access parts of your medical record, email your doctor's office, and request medication refills online. 1. In your internet browser, go to [email or web address] 2. Click on the First Time User? Click Here link in the Sign In box. You will see the New Member Sign Up page. 3. Enter your [employer] Access Code exactly as it appears below. You will not need to use this code after youve completed the sign-up process. If you do not sign up before the expiration date, you must request a new code. · [employer] Access Code: [email or web address] Expires: 5/21/2018  3:44 PM 
 
4. Enter the last four digits of your Social Security Number (xxxx) and Date of Birth (mm/dd/yyyy) as indicated and click Submit. You will be taken to the next sign-up page. 5. Create a [employer] ID. This will be your [employer] login ID and cannot be changed, so think of one that is secure and easy to remember. 6. Create a [employer] password. You can change your password at any time. 7. Enter your Password Reset Question and Answer. This can be used at a later time if you forget your password. 8. Enter your e-mail address. You will receive e-mail notification when new information is available in [address] 9. Click Sign Up. You can now view and download portions of your medical record. 10. Click the Download Summary menu link to download a portable copy of your medical information. If you have questions, please visit the Frequently Asked Questions section of the [employer] website. Remember, [employer] is NOT to be used for urgent needs. For medical emergencies, dial 911. Now available from your iPhone and Android! Please provide this summary of care documentation to your next provider. Your primary care clinician is listed as SANDER TRUONG. If you have any questions after today's visit, please call [phone].

## 2018-02-20 NOTE — PROGRESS NOTES
HISTORY OF PRESENT ILLNESS  Tony Villegas is a 80 y.o. female. LOUISA Stover is seen today accompanied by her children for evaluation of agitation and worsened dementia. Dementia. She has gotten into a pattern of sundowning. She is having some difficulty with sleep and gets more confused at night. Further, she has had some spells of agitation which have been directed at the caretaker who has been hired to help out at home. The caretaker is present for about 8 hours during the day plus cleaning and meals as well as helping with her medication. There have been no acute illnesses, fevers, chills, upper respiratory infection, or obvious signs of UTI. Reviewed with family the need to rule out metabolic causes as well as an occult UTI. We will order tests today and have a trial of Seroquel. Also, refer to geriatric psychiatry, Dr. Dallas Earl. They are in agreement with this plan. I did review potential medication side effects of Seroquel. They will start tonight and the caretaker will be present tonight. Also, reviewed the need to discontinue Aricept given possible interactions. Hypertension. Fair readings today, will recheck in one month. MedDATA/gwo     Past Medical History:   Diagnosis Date    DJD (degenerative joint disease)     Hematuria, microscopic     Hypertension     Memory loss     Menopause     Osteopenia          Review of Systems   Unable to perform ROS: Dementia   Genitourinary: Negative for dysuria and frequency. Psychiatric/Behavioral: Positive for hallucinations and memory loss. The patient has insomnia. Physical Exam   Constitutional: No distress. Cardiovascular: Normal rate and regular rhythm. Exam reveals no gallop and no friction rub. No murmur heard. Pulmonary/Chest: Effort normal and breath sounds normal.   Musculoskeletal: She exhibits no edema. Nursing note and vitals reviewed. ASSESSMENT and PLAN  Diagnoses and all orders for this visit:    1. Primary insomnia  -     QUEtiapine (SEROQUEL) 25 mg tablet; Take 0.5 Tabs by mouth nightly.  -     REFERRAL TO PSYCHIATRY    2. Agitation  -     QUEtiapine (SEROQUEL) 25 mg tablet; Take 0.5 Tabs by mouth nightly.  -     REFERRAL TO PSYCHIATRY  -     METABOLIC PANEL, COMPREHENSIVE  -     CBC WITH AUTOMATED DIFF  -     METABOLIC PANEL, COMPREHENSIVE  -     CBC WITH AUTOMATED DIFF    3. Moderate dementia with behavioral disturbance  -     QUEtiapine (SEROQUEL) 25 mg tablet; Take 0.5 Tabs by mouth nightly.  -     REFERRAL TO PSYCHIATRY   Discontinue aricept    4.  Recurrent UTI  -     URINALYSIS W/ RFLX MICROSCOPIC  -     CULTURE, URINE  -     CULTURE, URINE  -     URINALYSIS W/ RFLX MICROSCOPIC    Plan was reviewed with patient and family, understanding expressed

## 2018-02-22 LAB
ALBUMIN SERPL-MCNC: 4.4 G/DL (ref 3.2–4.6)
ALBUMIN/GLOB SERPL: 1.8 {RATIO} (ref 1.2–2.2)
ALP SERPL-CCNC: 59 IU/L (ref 39–117)
ALT SERPL-CCNC: 18 IU/L (ref 0–32)
APPEARANCE UR: ABNORMAL
AST SERPL-CCNC: 22 IU/L (ref 0–40)
BACTERIA #/AREA URNS HPF: ABNORMAL /[HPF]
BACTERIA UR CULT: NORMAL
BASOPHILS # BLD AUTO: 0.1 X10E3/UL (ref 0–0.2)
BASOPHILS NFR BLD AUTO: 1 %
BILIRUB SERPL-MCNC: 0.6 MG/DL (ref 0–1.2)
BILIRUB UR QL STRIP: NEGATIVE
BUN SERPL-MCNC: 21 MG/DL (ref 10–36)
BUN/CREAT SERPL: 26 (ref 12–28)
CALCIUM SERPL-MCNC: 10.5 MG/DL (ref 8.7–10.3)
CASTS URNS QL MICRO: ABNORMAL /LPF
CHLORIDE SERPL-SCNC: 103 MMOL/L (ref 96–106)
CO2 SERPL-SCNC: 22 MMOL/L (ref 18–29)
COLOR UR: YELLOW
CREAT SERPL-MCNC: 0.82 MG/DL (ref 0.57–1)
CRYSTALS URNS MICRO: ABNORMAL
EOSINOPHIL # BLD AUTO: 0.2 X10E3/UL (ref 0–0.4)
EOSINOPHIL NFR BLD AUTO: 2 %
EPI CELLS #/AREA URNS HPF: ABNORMAL /HPF
ERYTHROCYTE [DISTWIDTH] IN BLOOD BY AUTOMATED COUNT: 14.3 % (ref 12.3–15.4)
GFR SERPLBLD CREATININE-BSD FMLA CKD-EPI: 62 ML/MIN/{1.73_M2}
GFR SERPLBLD CREATININE-BSD FMLA CKD-EPI: 72 ML/MIN/{1.73_M2}
GLOBULIN SER CALC-MCNC: 2.5 G/L (ref 1.5–4.5)
GLUCOSE SERPL-MCNC: 90 MG/DL (ref 65–99)
GLUCOSE UR QL: NEGATIVE
HCT VFR BLD AUTO: 45.6 % (ref 34–46.6)
HGB BLD-MCNC: 15.5 G/DL (ref 11.1–15.9)
HGB UR QL STRIP: NEGATIVE
IMM GRANULOCYTES # BLD: 0 X10E3/UL (ref 0–0.1)
IMM GRANULOCYTES NFR BLD: 0 %
KETONES UR QL STRIP: NEGATIVE
LEUKOCYTE ESTERASE UR QL STRIP: ABNORMAL
LYMPHOCYTES # BLD AUTO: 1.7 X10E3/UL (ref 0.7–3.1)
LYMPHOCYTES NFR BLD AUTO: 21 %
MCH RBC QN AUTO: 31.2 PG (ref 26.6–33)
MCHC RBC AUTO-ENTMCNC: 34 G/DL (ref 31.5–35.7)
MCV RBC AUTO: 92 FL (ref 79–97)
MICRO URNS: ABNORMAL
MONOCYTES # BLD AUTO: 1 X10E3/UL (ref 0.1–0.9)
MONOCYTES NFR BLD AUTO: 12 %
MUCOUS THREADS URNS QL MICRO: PRESENT
NEUTROPHILS # BLD AUTO: 5.4 X10E3/UL (ref 1.4–7)
NEUTROPHILS NFR BLD AUTO: 64 %
NITRITE UR QL STRIP: NEGATIVE
PH UR STRIP: 7.5 [PH] (ref 5–7.5)
PLATELET # BLD AUTO: 221 X10E3/UL (ref 150–379)
POTASSIUM SERPL-SCNC: 4.4 MMOL/L (ref 3.5–5.2)
PROT SERPL-MCNC: 6.9 G/DL (ref 6–8.5)
PROT UR QL STRIP: NEGATIVE
RBC # BLD AUTO: 4.97 X10E6/UL (ref 3.77–5.28)
RBC #/AREA URNS HPF: ABNORMAL /HPF
SODIUM SERPL-SCNC: 144 MMOL/L (ref 134–144)
SP GR UR: 1.02 (ref 1–1.03)
UNIDENT CRYS URNS QL MICRO: PRESENT
UROBILINOGEN UR STRIP-MCNC: 0.2 MG/DL (ref 0.2–1)
WBC # BLD AUTO: 8.3 X10E3/UL (ref 3.4–10.8)
WBC #/AREA URNS HPF: ABNORMAL /HPF

## 2018-02-26 ENCOUNTER — TELEPHONE (OUTPATIENT)
Dept: INTERNAL MEDICINE CLINIC | Age: 83
End: 2018-02-26

## 2018-02-26 NOTE — TELEPHONE ENCOUNTER
Daughter, Shine Owusu, wants to remind Dr/nurse to send her the lab results as Mother has dementia.   Advise - [phone]

## 2018-02-26 NOTE — TELEPHONE ENCOUNTER
Called pt's daughter Carlos Garciaumble - left message to call back. Looks like letter sent out today for pt.

## 2018-02-27 NOTE — TELEPHONE ENCOUNTER
Pt's daughter Tita Likes (on [employer]IP[employer]) called back - requested to have anything being mailed like lab results to pt be mailed to her home. Pt has hx of memory loss. Will forward to Dipti to help with this. Miriam Hassan  [address] [facility], [address] [facility].

## 2018-02-27 NOTE — TELEPHONE ENCOUNTER
Attempted to call pt's daughter Shaka Adams left detailed message a letter was mailed out on Monday in regards to lab results. If she would like copies of lab results mailed to her to call back to office.

## 2018-03-19 ENCOUNTER — OFFICE VISIT (OUTPATIENT)
Dept: INTERNAL MEDICINE CLINIC | Age: 83
End: 2018-03-19

## 2018-03-19 VITALS
RESPIRATION RATE: 18 BRPM | HEIGHT: 61 IN | BODY MASS INDEX: 25.07 KG/M2 | WEIGHT: 132.8 LBS | HEART RATE: 63 BPM | OXYGEN SATURATION: 95 % | DIASTOLIC BLOOD PRESSURE: 63 MMHG | SYSTOLIC BLOOD PRESSURE: 134 MMHG

## 2018-03-19 DIAGNOSIS — F03.B18 MODERATE DEMENTIA WITH BEHAVIORAL DISTURBANCE: ICD-10-CM

## 2018-03-19 DIAGNOSIS — F51.01 PRIMARY INSOMNIA: Primary | ICD-10-CM

## 2018-03-19 NOTE — PROGRESS NOTES
HISTORY OF PRESENT ILLNESS  Kimi Ron is a 80 y.o. female. OLUISA Michael is seen today accompanied by her daughter Luis Alberto Alcala for follow up of dementia and other concerns. 1.  Dementia. She had been having sundowning. We started Seroquel. She has had a better sleep pattern without side effects and seems to have calmed her down significantly. Continue current regimen and still plan ahead for the need for potential psychiatry consultation. This is scheduled. 2.  Upper respiratory infection present for 7 days and appears to be resolving. Review of Systems   Respiratory: Positive for cough. Psychiatric/Behavioral: Positive for memory loss. The patient is nervous/anxious. The patient does not have insomnia. Physical Exam   Constitutional: No distress. Cardiovascular: Normal rate and regular rhythm. Exam reveals no gallop and no friction rub. No murmur heard. Pulmonary/Chest: Effort normal and breath sounds normal.   Skin:        Nursing note and vitals reviewed. ASSESSMENT and PLAN  Diagnoses and all orders for this visit:    1. Primary insomnia- Continue current regimen of prescription and / or OTC medications     2.  Moderate dementia with behavioral disturbance- Continue current regimen of prescription and / or OTC medications , See psychiatrist as directed     Plan was reviewed with patient and family, understanding expressed

## 2018-03-19 NOTE — MR AVS SNAPSHOT
[address] [facility] [address] [place] [address] 
[phone] Patient: April Soto MRN:  [id number] Visit Information Date & Time Provider Department Dept. Phone Encounter #  
 3/19/2018  1:40 PM Teresita White, [address] [place] [address] Internal Medicine [phone] [id number] Follow-up Instructions Return in about 6 months (around 9/19/2018). Upcoming Health Maintenance Date Due  
 GLAUCOMA SCREENING Q2Y 5/21/1990 MEDICARE YEARLY EXAM 5/21/1990 DTaP/Tdap/Td series (2 - Td) 4/6/2027 Allergies as of 3/19/2018  Review Complete On: 3/19/2018 By: Teresita White MD  
  
 Severity Noted Reaction Type Reactions Cephalexin  11/21/2009    Nausea Only Macrobid [Nitrofurantoin Monohyd/m-cryst]  11/21/2009    Myalgia Razadyne [Galantamine]  11/21/2009    Nausea Only Sulfa (Sulfonamide Antibiotics)  11/21/2009    Rash Current Immunizations  Reviewed on 10/17/2017 Name Date Influenza High Dose Vaccine PF 10/17/2017, 1/1/2017, 11/24/2015 Influenza Vaccine Split 10/10/2010 PPD 8/23/2011 Pneumococcal Conjugate (PCV-13) 3/28/2017 Td 5/12/2013 Tdap 4/6/2017 ZZZ-RETIRED (DO NOT USE) Pneumococcal Vaccine (Unspecified Type) 10/27/2005 Zoster 6/1/2007 Not reviewed this visit You Were Diagnosed With   
  
 Codes Comments Primary insomnia    -  Primary ICD-10-CM: F51.01 
ICD-9-CM: 307.42 Moderate dementia with behavioral disturbance     ICD-10-CM: F03.91 
ICD-9-CM: 294.21 Vitals BP Pulse Resp Height(growth percentile) Weight(growth percentile) SpO2  
 134/63 (BP 1 Location: Right arm, BP Patient Position: Sitting) 63 18 5' 1\" (1.549 m) 132 lb 12.8 oz (60.2 kg) 95% BMI OB Status Smoking Status 25.09 kg/m2 Postmenopausal Never Smoker BMI and BSA Data Body Mass Index Body Surface Area 25.09 kg/m 2 1.61 m 2 Preferred Pharmacy Pharmacy Name Phone [facility]/PHARMACY #[id number] Nick, [address] [phone] Your Updated Medication List  
  
   
This list is accurate as of 3/19/18  2:12 PM.  Always use your most recent med list.  
  
  
  
  
 aspirin delayed-release 81 mg tablet Take  by mouth daily. atenolol 25 mg tablet Commonly known as:  TENORMIN  
TAKE 1 TABLET BY MOUTH EVERY MORNING  
  
 hydroCHLOROthiazide 12.5 mg capsule Commonly known as:  Mallory Rossy TAKE ONE CAPSULE BY MOUTH EVERY MORNING  
  
 KLOR-CON 8 8 mEq tablet Generic drug:  potassium chloride SR  
TAKE 1 TABLET BY MOUTH EVERY DAY  
  
 memantine 10 mg tablet Commonly known as:  Chyrl Poplin TAKE 1 TABLET BY MOUTH TWICE A DAY  
  
 multivitamin tablet Commonly known as:  ONE A DAY Take 1 Tab by mouth daily. QUEtiapine 25 mg tablet Commonly known as:  SEROquel Take 0.5 Tabs by mouth nightly. Follow-up Instructions Return in about 6 months (around 9/19/2018). Introducing [facility] & HEALTH SERVICES! [facility] introduces [employer] patient portal. Now you can access parts of your medical record, email your doctor's office, and request medication refills online. 1. In your internet browser, go to [email or web address] 2. Click on the First Time User? Click Here link in the Sign In box. You will see the New Member Sign Up page. 3. Enter your [employer] Access Code exactly as it appears below. You will not need to use this code after youve completed the sign-up process. If you do not sign up before the expiration date, you must request a new code. · [employer] Access Code: [email or web address] Expires: 5/21/2018  4:44 PM 
 
4. Enter the last four digits of your Social Security Number (xxxx) and Date of Birth (mm/dd/yyyy) as indicated and click Submit. You will be taken to the next sign-up page. 5. Create a [employer] ID.  This will be your [employer] login ID and cannot be changed, so think of one that is secure and easy to remember. 6. Create a [employer] password. You can change your password at any time. 7. Enter your Password Reset Question and Answer. This can be used at a later time if you forget your password. 8. Enter your e-mail address. You will receive e-mail notification when new information is available in [address] 9. Click Sign Up. You can now view and download portions of your medical record. 10. Click the Download Summary menu link to download a portable copy of your medical information. If you have questions, please visit the Frequently Asked Questions section of the [employer] website. Remember, [employer] is NOT to be used for urgent needs. For medical emergencies, dial 911. Now available from your iPhone and Android! Please provide this summary of care documentation to your next provider. Your primary care clinician is listed as SANDER TRUONG. If you have any questions after today's visit, please call [phone].

## 2018-04-04 RX ORDER — ATENOLOL 25 MG/1
TABLET ORAL
Qty: 30 TAB | Refills: 6 | Status: SHIPPED | OUTPATIENT
Start: 2018-04-04 | End: 2018-05-11 | Stop reason: SDUPTHER

## 2018-04-17 ENCOUNTER — TELEPHONE (OUTPATIENT)
Dept: INTERNAL MEDICINE CLINIC | Age: 83
End: 2018-04-17

## 2018-04-17 NOTE — TELEPHONE ENCOUNTER
Spoke with pt's daughter Canelo Santamaria - states she received call today from pt's [facility] that cares for her. Told her she has noticed over past 2 1/2 weeks pt's appetite has decreased. Canelo Santamaria wanted to know if MD could recommend something (like medication) to help with increasing appetite?  Will forward to MD.

## 2018-04-17 NOTE — TELEPHONE ENCOUNTER
Pt's appetite as decreased a lot and daughter would like to discuss medication to help with that.   Please call daughter

## 2018-04-18 NOTE — TELEPHONE ENCOUNTER
Advised pt MD said there aren't a lot of good meds for appetite but he could adjust her current medication. She should have a visit to make sure she's not having anything else ongoing as well as document her weight.  Scheduled her for Monday 4/23/18 at 2:40 pm.

## 2018-04-23 ENCOUNTER — HOSPITAL ENCOUNTER (OUTPATIENT)
Dept: LAB | Age: 83
Discharge: HOME | End: 2018-04-23
Payer: MEDICARE

## 2018-04-23 ENCOUNTER — OFFICE VISIT (OUTPATIENT)
Dept: INTERNAL MEDICINE CLINIC | Age: 83
End: 2018-04-23

## 2018-04-23 VITALS
BODY MASS INDEX: 24.51 KG/M2 | WEIGHT: 129.8 LBS | DIASTOLIC BLOOD PRESSURE: 79 MMHG | HEIGHT: 61 IN | TEMPERATURE: 97.4 F | RESPIRATION RATE: 16 BRPM | SYSTOLIC BLOOD PRESSURE: 135 MMHG | HEART RATE: 56 BPM | OXYGEN SATURATION: 96 %

## 2018-04-23 DIAGNOSIS — R63.0 ANOREXIA: Primary | ICD-10-CM

## 2018-04-23 DIAGNOSIS — I10 ESSENTIAL HYPERTENSION, BENIGN: ICD-10-CM

## 2018-04-23 DIAGNOSIS — N39.0 RECURRENT UTI: ICD-10-CM

## 2018-04-23 DIAGNOSIS — R63.4 WEIGHT LOSS: ICD-10-CM

## 2018-04-23 LAB
BILIRUB UR QL STRIP: NEGATIVE
GLUCOSE UR-MCNC: NEGATIVE MG/DL
KETONES P FAST UR STRIP-MCNC: NEGATIVE MG/DL
PH UR STRIP: 8.5 [PH] (ref 4.6–8)
PROT UR QL STRIP: ABNORMAL
SP GR UR STRIP: 1.02 (ref 1–1.03)
UA UROBILINOGEN AMB POC: ABNORMAL (ref 0.2–1)
URINALYSIS CLARITY POC: CLEAR
URINALYSIS COLOR POC: YELLOW
URINE BLOOD POC: ABNORMAL
URINE LEUKOCYTES POC: ABNORMAL
URINE NITRITES POC: NEGATIVE

## 2018-04-23 PROCEDURE — 81001 URINALYSIS AUTO W/SCOPE: CPT

## 2018-04-23 PROCEDURE — 87086 URINE CULTURE/COLONY COUNT: CPT

## 2018-04-23 RX ORDER — CETIRIZINE HCL 10 MG
TABLET ORAL DAILY
COMMUNITY
End: 2020-08-27

## 2018-04-23 RX ORDER — AMOXICILLIN AND CLAVULANATE POTASSIUM 875; 125 MG/1; MG/1
1 TABLET, FILM COATED ORAL 2 TIMES DAILY
Qty: 10 TAB | Refills: 0 | Status: SHIPPED | OUTPATIENT
Start: 2018-04-23 | End: 2018-04-28

## 2018-04-23 NOTE — MR AVS SNAPSHOT
[address] [address] 
[phone] Patient: Devi Bonlila MRN:  [id number] Visit Information Date & Time Provider Department Dept. Phone Encounter #  
 4/23/2018  2:40 PM Rosalia Salazar, [facility] Internal Medicine [phone] [id number] Follow-up Instructions Return in about 5 months (around 9/23/2018). Upcoming Health Maintenance Date Due  
 GLAUCOMA SCREENING Q2Y 5/21/1990 MEDICARE YEARLY EXAM 3/14/2018 DTaP/Tdap/Td series (2 - Td) 4/6/2027 Allergies as of 4/23/2018  Review Complete On: 4/23/2018 By: Rosalia Salazar MD  
  
 Severity Noted Reaction Type Reactions Cephalexin  11/21/2009    Nausea Only Macrobid [Nitrofurantoin Monohyd/m-cryst]  11/21/2009    Myalgia Razadyne [Galantamine]  11/21/2009    Nausea Only Sulfa (Sulfonamide Antibiotics)  11/21/2009    Rash Current Immunizations  Reviewed on 10/17/2017 Name Date Influenza High Dose Vaccine PF 10/17/2017, 1/1/2017, 11/24/2015 Influenza Vaccine Split 10/10/2010 PPD 8/23/2011 Pneumococcal Conjugate (PCV-13) 3/28/2017 Td 5/12/2013 Tdap 4/6/2017 ZZZ-RETIRED (DO NOT USE) Pneumococcal Vaccine (Unspecified Type) 10/27/2005 Zoster 6/1/2007 Not reviewed this visit You Were Diagnosed With   
  
 Codes Comments Anorexia    -  Primary ICD-10-CM: R63.0 ICD-9-CM: 783.0 Recurrent UTI     ICD-10-CM: N39.0 ICD-9-CM: 599.0 Weight loss     ICD-10-CM: R63.4 ICD-9-CM: 783.21 Essential hypertension, benign     ICD-10-CM: I10 
ICD-9-CM: 401.1 Vitals BP Pulse Temp Resp Height(growth percentile) Weight(growth percentile) 135/79 (BP 1 Location: Right arm, BP Patient Position: Sitting) (!) 56 97.4 °F (36.3 °C) (Oral) 16 5' 1\" (1.549 m) 129 lb 12.8 oz (58.9 kg) SpO2 BMI OB Status Smoking Status 96% 24.53 kg/m2 Postmenopausal Never Smoker Vitals History BMI and BSA Data Body Mass Index Body Surface Area 24.53 kg/m 2 1.59 m 2 Preferred Pharmacy Pharmacy Name Phone [facility]/PHARMACY #[id number] Barthel, [address] [phone] Your Updated Medication List  
  
   
This list is accurate as of 4/23/18  3:51 PM.  Always use your most recent med list.  
  
  
  
  
 aspirin delayed-release 81 mg tablet Take  by mouth daily. atenolol 25 mg tablet Commonly known as:  TENORMIN  
TAKE 1 TABLET BY MOUTH EVERY MORNING  
  
 hydroCHLOROthiazide 12.5 mg capsule Commonly known as:  Connee Mage TAKE ONE CAPSULE BY MOUTH EVERY MORNING  
  
 KLOR-CON 8 8 mEq tablet Generic drug:  potassium chloride SR  
TAKE 1 TABLET BY MOUTH EVERY DAY  
  
 memantine 10 mg tablet Commonly known as:  Linda Better TAKE 1 TABLET BY MOUTH TWICE A DAY  
  
 multivitamin tablet Commonly known as:  ONE A DAY Take 1 Tab by mouth daily. QUEtiapine 25 mg tablet Commonly known as:  SEROquel Take 0.5 Tabs by mouth nightly. ZyrTEC 10 mg tablet Generic drug:  cetirizine Take  by mouth daily. We Performed the Following AMB POC URINALYSIS DIP STICK AUTO W/O MICRO [52091 CPT(R)] Follow-up Instructions Return in about 5 months (around 9/23/2018). Introducing [facility] & HEALTH SERVICES! Jesús Garay introduces [employer] patient portal. Now you can access parts of your medical record, email your doctor's office, and request medication refills online. 1. In your internet browser, go to [email or web address] 2. Click on the First Time User? Click Here link in the Sign In box. You will see the New Member Sign Up page. 3. Enter your [employer] Access Code exactly as it appears below. You will not need to use this code after youve completed the sign-up process. If you do not sign up before the expiration date, you must request a new code.  
 
· [employer] Access Code: [email or web address] 
 Expires: 5/21/2018  4:44 PM 
 
4. Enter the last four digits of your Social Security Number (xxxx) and Date of Birth (mm/dd/yyyy) as indicated and click Submit. You will be taken to the next sign-up page. 5. Create a Locket ID. This will be your [employer] login ID and cannot be changed, so think of one that is secure and easy to remember. 6. Create a L[employer] password. You can change your password at any time. 7. Enter your Password Reset Question and Answer. This can be used at a later time if you forget your password. 8. Enter your e-mail address. You will receive e-mail notification when new information is available in [address] 9. Click Sign Up. You can now view and download portions of your medical record. 10. Click the Download Summary menu link to download a portable copy of your medical information. If you have questions, please visit the Frequently Asked Questions section of the [employer] website. Remember, [employer] is NOT to be used for urgent needs. For medical emergencies, dial 911. Now available from your iPhone and Android! Please provide this summary of care documentation to your next provider. Your primary care clinician is listed as SANDER TRUONG. If you have any questions after today's visit, please call [phone].

## 2018-04-25 LAB
APPEARANCE UR: ABNORMAL
BACTERIA UR CULT: NORMAL
BILIRUB UR QL STRIP: NEGATIVE
COLOR UR: YELLOW
GLUCOSE UR QL: NEGATIVE
HGB UR QL STRIP: NEGATIVE
KETONES UR QL STRIP: NEGATIVE
LEUKOCYTE ESTERASE UR QL STRIP: NEGATIVE
MICRO URNS: ABNORMAL
NITRITE UR QL STRIP: NEGATIVE
PH UR STRIP: 8.5 [PH] (ref 5–7.5)
PROT UR QL STRIP: NEGATIVE
SP GR UR: 1.01 (ref 1–1.03)
UROBILINOGEN UR STRIP-MCNC: 0.2 MG/DL (ref 0.2–1)

## 2018-04-25 NOTE — PROGRESS NOTES
HISTORY OF PRESENT ILLNESS  Regino Saeed is a 80 y.o. female. HPI Apple Tobias is seen today accompanied by her daughter Augustus Ortiz who helps with most of the history given Shantell's dementia. She had a spell of decreased appetite over two weeks. We had advised a follow up to look for medical reasons for this. They had called initially requesting a medication for appetite improvement. It seems to be improving on its own. No localizing symptoms have been found. She is occasionally weepy, but otherwise, seems to be getting along okay. Her sleep has been much better with less sundowning and less anxiety on Seroquel. No symptoms upper respiratory infection or urinary tract infection noted. Review of systems notable for decreased weight of 3 pounds over the last 5 weeks. Regarding blood pressure. Initial readings were high, but improved on recheck. Urine dipstick appears infected and we will treat empirically pending culture. Past Medical History:   Diagnosis Date    DJD (degenerative joint disease)     Hematuria, microscopic     Hypertension     Memory loss     Menopause     Osteopenia          Review of Systems   Constitutional: Positive for weight loss. Gastrointestinal: Negative. Genitourinary: Negative. Psychiatric/Behavioral: Positive for memory loss. The patient is nervous/anxious. Physical Exam   Constitutional: No distress. Cardiovascular: Normal rate and regular rhythm. Exam reveals no gallop and no friction rub. No murmur heard. Pulmonary/Chest: Effort normal and breath sounds normal.   Neurological: She is alert. Skin: Skin is warm and dry. She is not diaphoretic. Nursing note and vitals reviewed. ASSESSMENT and PLAN  Diagnoses and all orders for this visit:    1. Anorexia- see hpi, Call with recurrence     2.  Recurrent UTI  -     AMB POC URINALYSIS DIP STICK AUTO W/O MICRO  -     URINALYSIS W/ RFLX MICROSCOPIC  -     CULTURE, URINE  - amoxicillin-clavulanate (AUGMENTIN) 875-125 mg per tablet; Take 1 Tab by mouth two (2) times a day for 5 days. 3. Weight loss- follow, reviewed nutritional supplements    4.  Essential hypertension, benign- Continue current regimen of prescription and / or OTC medications     Other orders  -     CULTURE, URINE    Plan was reviewed with patient and family, understanding expressed

## 2018-05-09 RX ORDER — HYDROCHLOROTHIAZIDE 12.5 MG/1
CAPSULE ORAL
Qty: 30 CAP | Refills: 11 | Status: SHIPPED | OUTPATIENT
Start: 2018-05-09 | End: 2018-05-11 | Stop reason: SDUPTHER

## 2018-05-09 RX ORDER — ATENOLOL 25 MG/1
TABLET ORAL
Qty: 30 TAB | OUTPATIENT
Start: 2018-05-09

## 2018-05-11 DIAGNOSIS — F03.B18 MODERATE DEMENTIA WITH BEHAVIORAL DISTURBANCE: ICD-10-CM

## 2018-05-11 DIAGNOSIS — R45.1 AGITATION: ICD-10-CM

## 2018-05-11 DIAGNOSIS — F51.01 PRIMARY INSOMNIA: ICD-10-CM

## 2018-05-11 RX ORDER — ATENOLOL 25 MG/1
TABLET ORAL
Qty: 30 TAB | Refills: 11 | Status: SHIPPED | OUTPATIENT
Start: 2018-05-11 | End: 2018-10-02 | Stop reason: SDUPTHER

## 2018-05-11 RX ORDER — QUETIAPINE FUMARATE 25 MG/1
12.5 TABLET, FILM COATED ORAL
Qty: 15 TAB | Refills: 11 | Status: SHIPPED | OUTPATIENT
Start: 2018-05-11 | End: 2021-05-07

## 2018-05-11 RX ORDER — POTASSIUM CHLORIDE 600 MG/1
TABLET, FILM COATED, EXTENDED RELEASE ORAL
Qty: 30 TAB | Refills: 11 | Status: SHIPPED | OUTPATIENT
Start: 2018-05-11 | End: 2018-10-02 | Stop reason: SDUPTHER

## 2018-05-11 RX ORDER — HYDROCHLOROTHIAZIDE 12.5 MG/1
CAPSULE ORAL
Qty: 30 CAP | Refills: 11 | Status: SHIPPED | OUTPATIENT
Start: 2018-05-11 | End: 2018-05-22 | Stop reason: SDUPTHER

## 2018-05-14 DIAGNOSIS — R45.1 AGITATION: ICD-10-CM

## 2018-05-14 DIAGNOSIS — F03.B18 MODERATE DEMENTIA WITH BEHAVIORAL DISTURBANCE: ICD-10-CM

## 2018-05-14 DIAGNOSIS — F51.01 PRIMARY INSOMNIA: ICD-10-CM

## 2018-05-14 RX ORDER — POTASSIUM CHLORIDE 600 MG/1
TABLET, FILM COATED, EXTENDED RELEASE ORAL
Qty: 30 TAB | Refills: 11 | OUTPATIENT
Start: 2018-05-14

## 2018-05-14 RX ORDER — QUETIAPINE FUMARATE 25 MG/1
12.5 TABLET, FILM COATED ORAL
Qty: 15 TAB | Refills: 11 | OUTPATIENT
Start: 2018-05-14

## 2018-05-15 DIAGNOSIS — F51.01 PRIMARY INSOMNIA: ICD-10-CM

## 2018-05-15 DIAGNOSIS — R45.1 AGITATION: ICD-10-CM

## 2018-05-15 DIAGNOSIS — F03.B18 MODERATE DEMENTIA WITH BEHAVIORAL DISTURBANCE: ICD-10-CM

## 2018-05-15 RX ORDER — ATENOLOL 25 MG/1
TABLET ORAL
Qty: 30 TAB | Refills: 11 | OUTPATIENT
Start: 2018-05-15

## 2018-05-15 RX ORDER — POTASSIUM CHLORIDE 600 MG/1
TABLET, FILM COATED, EXTENDED RELEASE ORAL
Qty: 30 TAB | Refills: 11 | OUTPATIENT
Start: 2018-05-15

## 2018-05-15 RX ORDER — QUETIAPINE FUMARATE 25 MG/1
12.5 TABLET, FILM COATED ORAL
Qty: 15 TAB | Refills: 11 | OUTPATIENT
Start: 2018-05-15

## 2018-05-15 RX ORDER — HYDROCHLOROTHIAZIDE 12.5 MG/1
CAPSULE ORAL
Qty: 30 CAP | Refills: 11 | OUTPATIENT
Start: 2018-05-15

## 2018-05-21 RX ORDER — ATENOLOL 25 MG/1
TABLET ORAL
Qty: 30 TAB | Refills: 11 | OUTPATIENT
Start: 2018-05-21

## 2018-05-22 RX ORDER — HYDROCHLOROTHIAZIDE 12.5 MG/1
CAPSULE ORAL
Qty: 90 CAP | Refills: 3 | Status: SHIPPED | OUTPATIENT
Start: 2018-05-22 | End: 2018-10-02 | Stop reason: SDUPTHER

## 2018-06-06 ENCOUNTER — TELEPHONE (OUTPATIENT)
Dept: INTERNAL MEDICINE CLINIC | Age: 83
End: 2018-06-06

## 2018-06-06 NOTE — TELEPHONE ENCOUNTER
Spoke with Thelma with Dr Quan Keepers office to get fax number [phone]. Faxed pt's referral order, 3 recent office notes and labs to ATTN: Thelma. Confirmation received.

## 2018-06-11 ENCOUNTER — TELEPHONE (OUTPATIENT)
Dept: INTERNAL MEDICINE CLINIC | Age: 83
End: 2018-06-11

## 2018-07-18 ENCOUNTER — HOSPITAL ENCOUNTER (OUTPATIENT)
Dept: LAB | Age: 83
Discharge: HOME | End: 2018-07-18
Payer: MEDICARE

## 2018-07-18 ENCOUNTER — OFFICE VISIT (OUTPATIENT)
Dept: INTERNAL MEDICINE CLINIC | Age: 83
End: 2018-07-18

## 2018-07-18 VITALS
HEIGHT: 61 IN | OXYGEN SATURATION: 96 % | RESPIRATION RATE: 16 BRPM | DIASTOLIC BLOOD PRESSURE: 70 MMHG | TEMPERATURE: 97.6 F | BODY MASS INDEX: 23.3 KG/M2 | WEIGHT: 123.4 LBS | HEART RATE: 56 BPM | SYSTOLIC BLOOD PRESSURE: 110 MMHG

## 2018-07-18 DIAGNOSIS — F03.B18 MODERATE DEMENTIA WITH BEHAVIORAL DISTURBANCE: ICD-10-CM

## 2018-07-18 DIAGNOSIS — R53.83 LETHARGIC: Primary | ICD-10-CM

## 2018-07-18 PROCEDURE — 81001 URINALYSIS AUTO W/SCOPE: CPT

## 2018-07-18 PROCEDURE — 85025 COMPLETE CBC W/AUTO DIFF WBC: CPT

## 2018-07-18 PROCEDURE — 80053 COMPREHEN METABOLIC PANEL: CPT

## 2018-07-18 PROCEDURE — 87086 URINE CULTURE/COLONY COUNT: CPT

## 2018-07-18 PROCEDURE — 36415 COLL VENOUS BLD VENIPUNCTURE: CPT

## 2018-07-18 NOTE — PROGRESS NOTES
HISTORY OF PRESENT ILLNESS  Jose Carias is a 80 y.o. female. HPI Sabina Conte is seen today accompanied by her daughter Isa Nunez who provides the history due to Shantell's dementia. The main concern is regarding fatigue and increased somnolence. Over the last 10 days, family and the caretaker Greta have noted that Sabina Conte has been sleeping much more than usual. She also has continues to lose some weight. Her appetite has been fair. She does not take in much fluid. She requires fairly continuous encouragement to drink liquids. Apparently there have been no fevers, chills, or any change in pain levels. There is no visible shortness of breath or any other type of cardiac symptom. She is on no new medications. She did see her new psychiatrist Dr. Gaby Cummings, but nothing was added or changed. She has been on a low dose of Seroquel for several months now. This has improved her insomnia and agitation. Reviewed workup with some labs and urinalysis to rule out infection or dehydration. If her symptoms persist and lab work is negative without any clinical illness developing, we may need to stop the Seroquel. We will follow up with Isa Nunez after results return. If there is any change in her status, she will let us know. Review of Systems   Unable to perform ROS: Dementia       Physical Exam   Constitutional: She appears lethargic. No distress. Mildly disheveled    Neck: Neck supple. Cardiovascular: Normal rate and regular rhythm. Exam reveals no gallop and no friction rub. No murmur heard. Pulmonary/Chest: Effort normal and breath sounds normal.   Abdominal: Soft. She exhibits no distension. There is no tenderness. There is no rebound and no guarding. Lymphadenopathy:     She has no cervical adenopathy. Neurological: She appears lethargic. Nursing note and vitals reviewed. ASSESSMENT and PLAN  Diagnoses and all orders for this visit:    1.  Lethargic  -     CBC WITH AUTOMATED DIFF  -     METABOLIC PANEL, COMPREHENSIVE  -     UA/M W/RFLX CULTURE, ROUTINE    2.  Moderate dementia with behavioral disturbance- see hpi    Plan was reviewed with  family, understanding expressed

## 2018-07-20 LAB
ALBUMIN SERPL-MCNC: 4.4 G/DL (ref 3.2–4.6)
ALBUMIN/GLOB SERPL: 2 {RATIO} (ref 1.2–2.2)
ALP SERPL-CCNC: 58 IU/L (ref 39–117)
ALT SERPL-CCNC: 11 IU/L (ref 0–32)
APPEARANCE UR: CLEAR
AST SERPL-CCNC: 17 IU/L (ref 0–40)
BACTERIA #/AREA URNS HPF: ABNORMAL /[HPF]
BACTERIA UR CULT: NORMAL
BASOPHILS # BLD AUTO: 0.1 X10E3/UL (ref 0–0.2)
BASOPHILS NFR BLD AUTO: 1 %
BILIRUB SERPL-MCNC: 0.4 MG/DL (ref 0–1.2)
BILIRUB UR QL STRIP: NEGATIVE
BUN SERPL-MCNC: 21 MG/DL (ref 10–36)
BUN/CREAT SERPL: 26 (ref 12–28)
CALCIUM SERPL-MCNC: 10.1 MG/DL (ref 8.7–10.3)
CASTS URNS QL MICRO: ABNORMAL /LPF
CHLORIDE SERPL-SCNC: 99 MMOL/L (ref 96–106)
CO2 SERPL-SCNC: 25 MMOL/L (ref 20–29)
COLOR UR: YELLOW
CREAT SERPL-MCNC: 0.8 MG/DL (ref 0.57–1)
EOSINOPHIL # BLD AUTO: 0.1 X10E3/UL (ref 0–0.4)
EOSINOPHIL NFR BLD AUTO: 2 %
EPI CELLS #/AREA URNS HPF: ABNORMAL /HPF
ERYTHROCYTE [DISTWIDTH] IN BLOOD BY AUTOMATED COUNT: 14.6 % (ref 12.3–15.4)
GLOBULIN SER CALC-MCNC: 2.2 G/DL (ref 1.5–4.5)
GLUCOSE SERPL-MCNC: 100 MG/DL (ref 65–99)
GLUCOSE UR QL: NEGATIVE
HCT VFR BLD AUTO: 45.7 % (ref 34–46.6)
HGB BLD-MCNC: 15 G/DL (ref 11.1–15.9)
HGB UR QL STRIP: NEGATIVE
IMM GRANULOCYTES # BLD: 0 X10E3/UL (ref 0–0.1)
IMM GRANULOCYTES NFR BLD: 0 %
KETONES UR QL STRIP: NEGATIVE
LEUKOCYTE ESTERASE UR QL STRIP: ABNORMAL
LYMPHOCYTES # BLD AUTO: 1.7 X10E3/UL (ref 0.7–3.1)
LYMPHOCYTES NFR BLD AUTO: 21 %
MCH RBC QN AUTO: 30.9 PG (ref 26.6–33)
MCHC RBC AUTO-ENTMCNC: 32.8 G/DL (ref 31.5–35.7)
MCV RBC AUTO: 94 FL (ref 79–97)
MICRO URNS: ABNORMAL
MONOCYTES # BLD AUTO: 0.6 X10E3/UL (ref 0.1–0.9)
MONOCYTES NFR BLD AUTO: 8 %
MUCOUS THREADS URNS QL MICRO: PRESENT
NEUTROPHILS # BLD AUTO: 5.6 X10E3/UL (ref 1.4–7)
NEUTROPHILS NFR BLD AUTO: 68 %
NITRITE UR QL STRIP: NEGATIVE
PH UR STRIP: 7 [PH] (ref 5–7.5)
PLATELET # BLD AUTO: 188 X10E3/UL (ref 150–379)
POTASSIUM SERPL-SCNC: 4.1 MMOL/L (ref 3.5–5.2)
PROT SERPL-MCNC: 6.6 G/DL (ref 6–8.5)
PROT UR QL STRIP: NEGATIVE
RBC # BLD AUTO: 4.86 X10E6/UL (ref 3.77–5.28)
RBC #/AREA URNS HPF: ABNORMAL /HPF
SODIUM SERPL-SCNC: 140 MMOL/L (ref 134–144)
SP GR UR: 1.02 (ref 1–1.03)
URINALYSIS REFLEX, 377202: ABNORMAL
UROBILINOGEN UR STRIP-MCNC: 0.2 MG/DL (ref 0.2–1)
WBC # BLD AUTO: 8.1 X10E3/UL (ref 3.4–10.8)
WBC #/AREA URNS HPF: ABNORMAL /HPF

## 2018-07-20 RX ORDER — LEVOFLOXACIN 250 MG/1
250 TABLET ORAL DAILY
Qty: 7 TAB | Refills: 0 | Status: SHIPPED | OUTPATIENT
Start: 2018-07-20 | End: 2018-07-27

## 2018-07-20 NOTE — PROGRESS NOTES
Reviewed with MD that Levaquin flagged - has interaction with pt's Seroquel. He advised to call pt's daughter - pt is to hold her Seroquel while taking Levaquin - may resume once completed. Called pt's daughter Rodo Bettencourt - advised of above per MD. She verbalizes understanding.

## 2018-07-20 NOTE — PROGRESS NOTES
Call daughter- Labs look great overall except there is evidence of uti, probably the source of her sx.  Start levaquin 250 mg every day x 7 days, let me know if there is a flagged interaction with donepezil

## 2018-09-24 ENCOUNTER — OFFICE VISIT (OUTPATIENT)
Dept: INTERNAL MEDICINE CLINIC | Age: 83
End: 2018-09-24

## 2018-09-24 ENCOUNTER — HOSPITAL ENCOUNTER (OUTPATIENT)
Dept: LAB | Age: 83
Discharge: HOME | End: 2018-09-24
Payer: MEDICARE

## 2018-09-24 VITALS
DIASTOLIC BLOOD PRESSURE: 64 MMHG | TEMPERATURE: 98.5 F | SYSTOLIC BLOOD PRESSURE: 120 MMHG | OXYGEN SATURATION: 98 % | BODY MASS INDEX: 22.66 KG/M2 | HEIGHT: 61 IN | HEART RATE: 54 BPM | RESPIRATION RATE: 16 BRPM | WEIGHT: 120 LBS

## 2018-09-24 DIAGNOSIS — F03.B18 MODERATE DEMENTIA WITH BEHAVIORAL DISTURBANCE: ICD-10-CM

## 2018-09-24 DIAGNOSIS — N39.0 RECURRENT UTI: Primary | ICD-10-CM

## 2018-09-24 DIAGNOSIS — R63.4 WEIGHT LOSS: ICD-10-CM

## 2018-09-24 DIAGNOSIS — R73.09 OTHER ABNORMAL GLUCOSE: ICD-10-CM

## 2018-09-24 DIAGNOSIS — I10 ESSENTIAL HYPERTENSION: ICD-10-CM

## 2018-09-24 PROCEDURE — 87086 URINE CULTURE/COLONY COUNT: CPT

## 2018-09-24 PROCEDURE — 81001 URINALYSIS AUTO W/SCOPE: CPT

## 2018-09-24 RX ORDER — ACETAMINOPHEN 325 MG/1
650 TABLET ORAL
COMMUNITY
End: 2021-05-12

## 2018-09-24 NOTE — PROGRESS NOTES
HISTORY OF PRESENT ILLNESS Elia Koch is a 80 y.o. female. HPI Tram Castaneda is seen today accompanied by her daughter Kathy Moon for follow up of hypertension, possible UTI and other concerns. Kathy Moon provides the majority of the history as Tram Castaneda has had progressive dementia. She is conversant but offers little in the way of meaningful history. 1. Recurrent UTI. They brought in a urine specimen. It is difficult to collect these in the office. This was sent to the lab. She has no obvious UTI symptoms but has had some weight loss and morning low back pain for a week or so. This improves with Tylenol. 2. Weight loss. She is down about 6 pounds in 6 months. I think it is functional with her dementia. She seems to eat well but I suspect her capacity is lower. I encouraged Kathy Moon to offer additional nutritional support drinks such as Ensure or Boost.  
3. Hypertension. Stable on current regimen. 4. Dementia. She is following up with psychiatry at this time. Review of systems notable for some increased sleepiness. Review of Systems Unable to perform ROS: Dementia Constitutional: Positive for weight loss. Negative for chills and fever. Musculoskeletal: Positive for back pain. Physical Exam  
Constitutional: She appears well-nourished. Neck: Carotid bruit is not present. Cardiovascular: Normal rate and regular rhythm. Exam reveals no gallop and no friction rub. No murmur heard. Pulmonary/Chest: Effort normal and breath sounds normal. No respiratory distress. Musculoskeletal: She exhibits no edema. Lumbar back: She exhibits normal range of motion, no tenderness, no bony tenderness and no spasm. Neurological: She is alert. Nursing note and vitals reviewed. ASSESSMENT and PLAN Diagnoses and all orders for this visit: 1. Recurrent UTI- Proceed with plan as discussed 2. Essential hypertension- Continue current regimen of prescription and / or OTC medications 3. Moderate dementia with behavioral disturbance- Continue current regimen of prescription and / or OTC medications , See psychiatrist as directed 4. Other abnormal glucose- Follow off treatment 5. Weight loss 
-     URINALYSIS W/ RFLX MICROSCOPIC 
-     CULTURE, URINE Other orders -     MICROSCOPIC EXAMINATION

## 2018-09-24 NOTE — MR AVS SNAPSHOT
[address] 
[phone] Patient: Onur Balderas MRN:  [id number] Visit Information Date & Time Provider Department Dept. Phone Encounter #  
 9/24/2018  2:40 PM Nick Lynch, [address] [phone] [id number] Follow-up Instructions Return in about 6 months (around 3/24/2019). Upcoming Health Maintenance Date Due Shingrix Vaccine Age 50> (1 of 2) 5/21/1975 GLAUCOMA SCREENING Q2Y 5/21/1990 MEDICARE YEARLY EXAM 3/14/2018 Influenza Age 5 to Adult 8/1/2018 DTaP/Tdap/Td series (2 - Td) 4/6/2027 Allergies as of 9/24/2018  Review Complete On: 9/24/2018 By: Nick Lynch MD  
  
 Severity Noted Reaction Type Reactions Cephalexin  11/21/2009    Nausea Only Macrobid [Nitrofurantoin Monohyd/m-cryst]  11/21/2009    Myalgia Razadyne [Galantamine]  11/21/2009    Nausea Only Sulfa (Sulfonamide Antibiotics)  11/21/2009    Rash Current Immunizations  Reviewed on 10/17/2017 Name Date Influenza High Dose Vaccine PF 10/17/2017, 1/1/2017, 11/24/2015 Influenza Vaccine Split 10/10/2010 PPD 8/23/2011 Pneumococcal Conjugate (PCV-13) 3/28/2017 Td 5/12/2013 Tdap 4/6/2017 ZZZ-RETIRED (DO NOT USE) Pneumococcal Vaccine (Unspecified Type) 10/27/2005 Zoster 6/1/2007 Not reviewed this visit You Were Diagnosed With   
  
 Codes Comments Recurrent UTI    -  Primary ICD-10-CM: N39.0 ICD-9-CM: 599.0 Essential hypertension     ICD-10-CM: I10 
ICD-9-CM: 401.9 Moderate dementia with behavioral disturbance     ICD-10-CM: F03.91 
ICD-9-CM: 294.21 Other abnormal glucose     ICD-10-CM: R73.09 
ICD-9-CM: 790.29 Weight loss     ICD-10-CM: R63.4 ICD-9-CM: 783.21 Vitals BP Pulse Temp Resp Height(growth percentile) Weight(growth percentile) 120/64 (!) 54 98.5 °F (36.9 °C) (Oral) 16 5' 1\" (1.549 m) 120 lb (54.4 kg) SpO2 BMI OB Status Smoking Status 98% 22.67 kg/m2 Postmenopausal Never Smoker BMI and BSA Data Body Mass Index Body Surface Area  
 22.67 kg/m 2 1.53 m 2 Preferred Pharmacy Pharmacy Name Phone [id number], [address] [phone] Your Updated Medication List  
  
   
This list is accurate as of 9/24/18  3:46 PM.  Always use your most recent med list.  
  
  
  
  
 aspirin delayed-release 81 mg tablet Take  by mouth daily. atenolol 25 mg tablet Commonly known as:  TENORMIN  
TAKE 1 TABLET BY MOUTH EVERY MORNING  
  
 hydroCHLOROthiazide 12.5 mg capsule Commonly known as:  Denis Grange TAKE ONE CAPSULE BY MOUTH EVERY MORNING  
  
 memantine 10 mg tablet Commonly known as:  [place]ultze TAKE 1 TABLET BY MOUTH TWICE A DAY  
  
 multivitamin tablet Commonly known as:  ONE A DAY Take 1 Tab by mouth daily. potassium chloride SR 8 mEq tablet Commonly known as:  KLOR-CON 8  
TAKE 1 TABLET BY MOUTH EVERY DAY  
  
 QUEtiapine 25 mg tablet Commonly known as:  SEROquel Take 0.5 Tabs by mouth nightly. TYLENOL 325 mg tablet Generic drug:  acetaminophen Take  by mouth every four (4) hours as needed for Pain. ZyrTEC 10 mg tablet Generic drug:  cetirizine Take  by mouth daily. We Performed the Following CULTURE, URINE [id number] CPT(R)] URINALYSIS W/ RFLX MICROSCOPIC [03616 CPT(R)] Follow-up Instructions Return in about 6 months (around 3/24/2019). Introducing [facility]! [employer] introduces [employer] patient portal. Now you can access parts of your medical record, email your doctor's office, and request medication refills online. 1. In your internet browser, go to [email or web address] 2. Click on the First Time User? Click Here link in the Sign In box.  You will see the New Member Sign Up page. 3. Enter your [employer] Access Code exactly as it appears below. You will not need to use this code after youve completed the sign-up process. If you do not sign up before the expiration date, you must request a new code. · [employer] Access Code: [email or web address] Expires: 10/16/2018  2:30 PM 
 
4. Enter the last four digits of your Social Security Number (xxxx) and Date of Birth (mm/dd/yyyy) as indicated and click Submit. You will be taken to the next sign-up page. 5. Create a [employer] ID. This will be your [employer] login ID and cannot be changed, so think of one that is secure and easy to remember. 6. Create a [employer] password. You can change your password at any time. 7. Enter your Password Reset Question and Answer. This can be used at a later time if you forget your password. 8. Enter your e-mail address. You will receive e-mail notification when new information is available in [address]. 9. Click Sign Up. You can now view and download portions of your medical record. 10. Click the Download Summary menu link to download a portable copy of your medical information. If you have questions, please visit the Frequently Asked Questions section of the [employer] website. Remember, [employer] is NOT to be used for urgent needs. For medical emergencies, dial 911. Now available from your iPhone and Android! Please provide this summary of care documentation to your next provider. Your primary care clinician is listed as SANDER TRUONG. If you have any questions after today's visit, please call [phone].

## 2018-09-25 LAB
APPEARANCE UR: CLEAR
BACTERIA #/AREA URNS HPF: ABNORMAL /[HPF]
BACTERIA UR CULT: NORMAL
BILIRUB UR QL STRIP: NEGATIVE
CASTS URNS QL MICRO: ABNORMAL /LPF
COLOR UR: YELLOW
EPI CELLS #/AREA URNS HPF: ABNORMAL /HPF
GLUCOSE UR QL: NEGATIVE
HGB UR QL STRIP: NEGATIVE
KETONES UR QL STRIP: NEGATIVE
LEUKOCYTE ESTERASE UR QL STRIP: ABNORMAL
MICRO URNS: ABNORMAL
MUCOUS THREADS URNS QL MICRO: PRESENT
NITRITE UR QL STRIP: NEGATIVE
PH UR STRIP: 6.5 [PH] (ref 5–7.5)
PROT UR QL STRIP: NEGATIVE
RBC #/AREA URNS HPF: ABNORMAL /HPF
SP GR UR: 1.02 (ref 1–1.03)
UROBILINOGEN UR STRIP-MCNC: 0.2 MG/DL (ref 0.2–1)
WBC #/AREA URNS HPF: ABNORMAL /HPF

## 2018-09-27 ENCOUNTER — TELEPHONE (OUTPATIENT)
Dept: INTERNAL MEDICINE CLINIC | Age: 83
End: 2018-09-27

## 2018-09-27 DIAGNOSIS — N39.0 RECURRENT UTI: Primary | ICD-10-CM

## 2018-09-27 RX ORDER — AMOXICILLIN AND CLAVULANATE POTASSIUM 875; 125 MG/1; MG/1
1 TABLET, FILM COATED ORAL EVERY 12 HOURS
Qty: 14 TAB | Refills: 0 | Status: SHIPPED | OUTPATIENT
Start: 2018-09-27 | End: 2018-10-04

## 2018-09-27 NOTE — TELEPHONE ENCOUNTER
Reviewed urine cx. They never have growth despite infected appearance to urine. Will treat for 7 days, Milagro Hunt will observe for improvements as sx aren't obvious, report to me after treatment. Also will tg urine 3 d post completion of treatment.

## 2018-10-02 RX ORDER — HYDROCHLOROTHIAZIDE 12.5 MG/1
CAPSULE ORAL
Qty: 90 CAP | Refills: 1 | Status: SHIPPED | OUTPATIENT
Start: 2018-10-02 | End: 2021-05-07

## 2018-10-02 RX ORDER — ATENOLOL 25 MG/1
TABLET ORAL
Qty: 30 TAB | Refills: 5 | Status: SHIPPED | OUTPATIENT
Start: 2018-10-02 | End: 2018-10-05 | Stop reason: SDUPTHER

## 2018-10-02 RX ORDER — POTASSIUM CHLORIDE 600 MG/1
TABLET, FILM COATED, EXTENDED RELEASE ORAL
Qty: 30 TAB | Refills: 5 | Status: SHIPPED | OUTPATIENT
Start: 2018-10-02 | End: 2018-10-05 | Stop reason: SDUPTHER

## 2018-10-02 RX ORDER — POTASSIUM CHLORIDE 600 MG/1
TABLET, FILM COATED, EXTENDED RELEASE ORAL
Qty: 30 TAB | Refills: 11 | Status: CANCELLED | OUTPATIENT
Start: 2018-10-02

## 2018-10-05 RX ORDER — POTASSIUM CHLORIDE 600 MG/1
TABLET, FILM COATED, EXTENDED RELEASE ORAL
Qty: 90 TAB | Refills: 1 | Status: SHIPPED | OUTPATIENT
Start: 2018-10-05 | End: 2019-07-13 | Stop reason: SDUPTHER

## 2018-10-05 RX ORDER — ATENOLOL 25 MG/1
TABLET ORAL
Qty: 90 TAB | Refills: 1 | Status: SHIPPED | OUTPATIENT
Start: 2018-10-05 | End: 2019-07-08 | Stop reason: ALTCHOICE

## 2018-10-05 NOTE — TELEPHONE ENCOUNTER
Fax received from [facility] requesting refill on pt's Atenolol 25 mg and Potassium #90 for each Rx. Rxs have been sent.

## 2018-10-08 ENCOUNTER — HOSPITAL ENCOUNTER (OUTPATIENT)
Dept: LAB | Age: 83
Discharge: HOME | End: 2018-10-08
Payer: MEDICARE

## 2018-10-08 PROCEDURE — 81001 URINALYSIS AUTO W/SCOPE: CPT

## 2018-10-08 PROCEDURE — 87088 URINE BACTERIA CULTURE: CPT

## 2018-10-08 PROCEDURE — 87077 CULTURE AEROBIC IDENTIFY: CPT

## 2018-10-08 PROCEDURE — 87086 URINE CULTURE/COLONY COUNT: CPT

## 2018-10-09 LAB
APPEARANCE UR: CLEAR
BACTERIA #/AREA URNS HPF: ABNORMAL /[HPF]
BILIRUB UR QL STRIP: NEGATIVE
CASTS URNS QL MICRO: ABNORMAL /LPF
COLOR UR: YELLOW
EPI CELLS #/AREA URNS HPF: ABNORMAL /HPF
GLUCOSE UR QL: NEGATIVE
HGB UR QL STRIP: NEGATIVE
KETONES UR QL STRIP: NEGATIVE
LEUKOCYTE ESTERASE UR QL STRIP: ABNORMAL
MICRO URNS: ABNORMAL
MUCOUS THREADS URNS QL MICRO: PRESENT
NITRITE UR QL STRIP: NEGATIVE
PH UR STRIP: 7 [PH] (ref 5–7.5)
PROT UR QL STRIP: NEGATIVE
RBC #/AREA URNS HPF: ABNORMAL /HPF
SP GR UR: 1.02 (ref 1–1.03)
UROBILINOGEN UR STRIP-MCNC: 0.2 MG/DL (ref 0.2–1)
WBC #/AREA URNS HPF: >30 /HPF

## 2018-10-11 ENCOUNTER — TELEPHONE (OUTPATIENT)
Dept: INTERNAL MEDICINE CLINIC | Age: 83
End: 2018-10-11

## 2018-10-11 DIAGNOSIS — N39.0 URINARY TRACT INFECTION WITHOUT HEMATURIA, SITE UNSPECIFIED: Primary | ICD-10-CM

## 2018-10-11 LAB
BACTERIA UR CULT: ABNORMAL
BACTERIA UR CULT: ABNORMAL

## 2018-10-11 RX ORDER — AMOXICILLIN AND CLAVULANATE POTASSIUM 875; 125 MG/1; MG/1
1 TABLET, FILM COATED ORAL EVERY 12 HOURS
Qty: 14 TAB | Refills: 0 | Status: SHIPPED | OUTPATIENT
Start: 2018-10-11 | End: 2018-10-18

## 2018-10-11 NOTE — TELEPHONE ENCOUNTER
Advised pt's daughter Alisson (on HIPAA) - there may be another uti. MD wanted to know if pt seemed better in any way while on the previous antibiotic course? She states she could not tell and pt dementia so bad pt could not tell her if it helped.  Will forward to MD.

## 2018-10-11 NOTE — TELEPHONE ENCOUNTER
Advised pt's daughter Mandi Adams MD wants pt to start augmentin 875 mg bid x 7 days. Repeat urinalysis and culture in 2 weeks. If infection is refractory may require urology consult.

## 2018-10-11 NOTE — TELEPHONE ENCOUNTER
Spoke with pt's daughter Rj Gonzalez - states after we spoke earlier today she thought about if antibiotic helped. She states she thinks it did. Pt was complaining of back pain prior to antibiotic and once on it - she stopped complaining.  Will forward to MD.

## 2018-10-11 NOTE — TELEPHONE ENCOUNTER
Call daughter- there may be another uti, did she seem better in any way while on the previous antibiotic course ?

## 2018-10-25 ENCOUNTER — HOSPITAL ENCOUNTER (OUTPATIENT)
Dept: LAB | Age: 83
Discharge: HOME | End: 2018-10-25
Payer: MEDICARE

## 2018-10-25 PROCEDURE — 87088 URINE BACTERIA CULTURE: CPT

## 2018-10-25 PROCEDURE — 87086 URINE CULTURE/COLONY COUNT: CPT

## 2018-10-25 PROCEDURE — 87186 SC STD MICRODIL/AGAR DIL: CPT

## 2018-10-25 PROCEDURE — 81001 URINALYSIS AUTO W/SCOPE: CPT

## 2018-10-26 LAB
APPEARANCE UR: ABNORMAL
BACTERIA #/AREA URNS HPF: ABNORMAL /[HPF]
BILIRUB UR QL STRIP: NEGATIVE
CASTS URNS QL MICRO: ABNORMAL /LPF
COLOR UR: YELLOW
EPI CELLS #/AREA URNS HPF: ABNORMAL /HPF
GLUCOSE UR QL: NEGATIVE
HGB UR QL STRIP: ABNORMAL
KETONES UR QL STRIP: NEGATIVE
LEUKOCYTE ESTERASE UR QL STRIP: ABNORMAL
MICRO URNS: ABNORMAL
MUCOUS THREADS URNS QL MICRO: PRESENT
NITRITE UR QL STRIP: NEGATIVE
PH UR STRIP: 6.5 [PH] (ref 5–7.5)
PROT UR QL STRIP: NEGATIVE
RBC #/AREA URNS HPF: ABNORMAL /HPF
SP GR UR: 1.02 (ref 1–1.03)
UROBILINOGEN UR STRIP-MCNC: 0.2 MG/DL (ref 0.2–1)
WBC #/AREA URNS HPF: >30 /HPF

## 2018-10-26 NOTE — PROGRESS NOTES
Spoke with pt's daughter [facility] - VANNESSA BENTLEY - advised her urinalysis appears infected. Did she notice if pt was having any symptoms? She states hard to tell due to her confusion. In past when she's had UTI she would complain of back pain but she has not complained about this time. Noted last culture MD suggested urology consult. She states pt has seen Tony Anderson many years ago.  Will forward to MD.

## 2018-10-26 NOTE — PROGRESS NOTES
Urine appears to be infected--culture pending? Please call to determine if the patient is having symptoms.   Pyuria appears to be chronic

## 2018-10-28 NOTE — PROGRESS NOTES
There may be a uti. If symptoms are present obtain culture and start treatment.  If no symptoms, obtain culture and treat only if positive. 599.0

## 2018-10-29 ENCOUNTER — TELEPHONE (OUTPATIENT)
Dept: INTERNAL MEDICINE CLINIC | Age: 83
End: 2018-10-29

## 2018-10-29 NOTE — PROGRESS NOTES
Spoke with pt's daughter Elena Sheffield (on HIPAA) advised her pt may have another uti. Culture is pending. MD recommends consult with Dr Libby Cruz for her recurrent uti - given office number for her to schedule appt [phone].

## 2018-10-30 LAB
BACTERIA UR CULT: ABNORMAL
BACTERIA UR CULT: ABNORMAL

## 2018-11-02 DIAGNOSIS — N39.0 RECURRENT UTI: Primary | ICD-10-CM

## 2018-11-02 NOTE — PROGRESS NOTES
Advised pt's daughter Angella Purcell (on HIPAA) that pt has another uti - between her drug allergies, interaction contraindication and bacteria resistance there's nothing oral she can be given. Would need an injection that urologist have but we don't. Advise asap consult with Dr Francia Cranker. She wants to schedule due to she keeps grandchildren. Faxed to Dr Francia Cranker last 3 ov and last 2 years of urinalysis and culture data - noted pt needs asap appt for treatment. Confirmation received.

## 2018-11-02 NOTE — PROGRESS NOTES
Call Latoya Mckeon- there's another uti, and between her drug allergies, interaction contraindication and bacteria resistance there's nothing oral she can be given. Would need an injection that urologist have but we don't. Advise asap consult with Dr Rukhsana Villegas. Schedule for them if needed.  Send Dr Rukhsana Villegas last 3 ov, and last year of urinalysis and culture data yes

## 2019-04-25 ENCOUNTER — OFFICE VISIT (OUTPATIENT)
Dept: INTERNAL MEDICINE CLINIC | Age: 84
End: 2019-04-25

## 2019-04-25 VITALS
SYSTOLIC BLOOD PRESSURE: 134 MMHG | HEIGHT: 61 IN | TEMPERATURE: 97.3 F | OXYGEN SATURATION: 100 % | HEART RATE: 69 BPM | RESPIRATION RATE: 18 BRPM | DIASTOLIC BLOOD PRESSURE: 84 MMHG | BODY MASS INDEX: 22.3 KG/M2 | WEIGHT: 118.13 LBS

## 2019-04-25 DIAGNOSIS — I10 ESSENTIAL HYPERTENSION: Primary | ICD-10-CM

## 2019-04-25 DIAGNOSIS — R73.09 OTHER ABNORMAL GLUCOSE: ICD-10-CM

## 2019-04-25 DIAGNOSIS — F03.B18 MODERATE DEMENTIA WITH BEHAVIORAL DISTURBANCE: ICD-10-CM

## 2019-07-02 DIAGNOSIS — I10 ESSENTIAL HYPERTENSION: Primary | ICD-10-CM

## 2019-07-02 DIAGNOSIS — Z11.1 SCREENING-PULMONARY TB: ICD-10-CM

## 2019-07-08 ENCOUNTER — OFFICE VISIT (OUTPATIENT)
Dept: INTERNAL MEDICINE CLINIC | Age: 84
End: 2019-07-08

## 2019-07-08 VITALS
TEMPERATURE: 97.8 F | RESPIRATION RATE: 16 BRPM | SYSTOLIC BLOOD PRESSURE: 110 MMHG | BODY MASS INDEX: 21.98 KG/M2 | HEIGHT: 61 IN | HEART RATE: 50 BPM | OXYGEN SATURATION: 97 % | WEIGHT: 116.4 LBS | DIASTOLIC BLOOD PRESSURE: 60 MMHG

## 2019-07-08 DIAGNOSIS — F03.B18 MODERATE DEMENTIA WITH BEHAVIORAL DISTURBANCE: ICD-10-CM

## 2019-07-08 DIAGNOSIS — N39.0 RECURRENT UTI: ICD-10-CM

## 2019-07-08 DIAGNOSIS — F51.01 PRIMARY INSOMNIA: ICD-10-CM

## 2019-07-08 DIAGNOSIS — I10 ESSENTIAL HYPERTENSION: Primary | ICD-10-CM

## 2019-07-08 NOTE — PROGRESS NOTES
HISTORY OF PRESENT ILLNESS  Santo Dan is a 80 y.o. female. HPI Subjective:  Early Ty is seen today accompanied by her daughter, [employer] Michaela, for follow up of hypertension and other concerns. [employer] provides the history given Shantell's dementia. She does answer questions, but is unable to provide any true historical information. 1. Hypertension. She has an excellent BP and a lower heart rate. Will discontinue Atenolol. Also based on risks and her other medical problems, we can stop her aspirin. 2. IFG. She is due for an A1c check. 3. Dementia with insomnia. She is up to date with psychiatry follow up. She has been clinically stable. 4. Weight loss. This is chronic and gradual.  There has been no apparent organic cause. I believe this is due to her dementia. Social History:  Notable for a planned move to an assisted living facility, specifically one with a memory care unit, the [facility] or [address] [place] [address] are the two candidates. Forms will be completed. She will need TB screening with lab tests. Medications:  Fully reviewed and updated. This was an extended visit of high complexity. Current Outpatient Medications   Medication Sig    potassium chloride SR (KLOR-CON 8) 8 mEq tablet TAKE 1 TABLET BY MOUTH EVERY DAY    hydroCHLOROthiazide (MICROZIDE) 12.5 mg capsule TAKE ONE CAPSULE BY MOUTH EVERY MORNING    acetaminophen (TYLENOL) 325 mg tablet Take  by mouth every four (4) hours as needed for Pain.  QUEtiapine (SEROQUEL) 25 mg tablet Take 0.5 Tabs by mouth nightly.  cetirizine (ZYRTEC) 10 mg tablet Take  by mouth daily.  multivitamin (ONE A DAY) tablet Take 1 Tab by mouth daily.  memantine (NAMENDA) 10 mg tablet TAKE 1 TABLET BY MOUTH TWICE A DAY     No current facility-administered medications for this visit. Review of Systems   Unable to perform ROS: Dementia   All other systems reviewed and are negative. Physical Exam   Constitutional: No distress.    Neck: Neck supple. Cardiovascular: Normal rate and regular rhythm. Exam reveals no gallop and no friction rub. No murmur heard. Pulmonary/Chest: Effort normal and breath sounds normal.   Musculoskeletal: She exhibits no edema. Lymphadenopathy:     She has no cervical adenopathy. Neurological: She is alert. Psychiatric: She has a normal mood and affect. Definite signs of memory loss   Nursing note and vitals reviewed. ASSESSMENT and PLAN  Diagnoses and all orders for this visit:    1. Essential hypertension- Proceed with plan as discussed     2. Moderate dementia with behavioral disturbance- Continue current regimen of prescription and / or OTC medications , See psychiatrist as directed     3. Recurrent UTI- Follow off treatment     4.  Primary insomnia- Continue current regimen of prescription and / or OTC medications , See psychiatrist as directed     Plan was reviewed with patient and family, understanding expressed

## 2019-07-08 NOTE — PROGRESS NOTES
Identified pt with two pt identifiers(name and ). Reviewed record in preparation for visit and have obtained necessary documentation. All patient medications has been reviewed. Chief Complaint   Patient presents with    Annual Wellness Visit       Health Maintenance Due   Topic    Shingrix Vaccine Age 50> (1 of 2)    GLAUCOMA SCREENING Q2Y     MEDICARE YEARLY EXAM     Pneumococcal 65+ years (2 of 2 - PPSV23)       Vitals:    19 1650   BP: 110/60   Pulse: (!) 50   Resp: 16   Temp: 97.8 °F (36.6 °C)   TempSrc: Oral   SpO2: 97%   Weight: 116 lb 6.4 oz (52.8 kg)   Height: 5' 1\" (1.549 m)   PainSc:   0 - No pain       Coordination of Care Questionnaire:   1) Have you been to an emergency room, urgent care, or hospitalized since your last visit?   no       2. Have seen or consulted any other health care provider since your last visit? NO    3) Do you have an Advanced Directive/ Living Will in place? YES  If yes, do we have a copy on file NO  If no, would you like information NO    Patient is accompanied by daughter I have received verbal consent from Maco Saldivar to discuss any/all medical information while they are present in the room.

## 2019-07-09 ENCOUNTER — TELEPHONE (OUTPATIENT)
Dept: INTERNAL MEDICINE CLINIC | Age: 84
End: 2019-07-09

## 2019-07-09 ENCOUNTER — HOSPITAL ENCOUNTER (OUTPATIENT)
Dept: LAB | Age: 84
Discharge: HOME | End: 2019-07-09
Payer: MEDICARE

## 2019-07-09 PROCEDURE — 80053 COMPREHEN METABOLIC PANEL: CPT

## 2019-07-09 PROCEDURE — 84443 ASSAY THYROID STIM HORMONE: CPT

## 2019-07-09 PROCEDURE — 85025 COMPLETE CBC W/AUTO DIFF WBC: CPT

## 2019-07-09 PROCEDURE — 36415 COLL VENOUS BLD VENIPUNCTURE: CPT

## 2019-07-09 NOTE — TELEPHONE ENCOUNTER
LMTCB for pt's daughter Linda Nerissa in regards to these forms. MD has filled out forms for [employer] recently.

## 2019-07-10 ENCOUNTER — HOSPITAL ENCOUNTER (OUTPATIENT)
Dept: LAB | Age: 84
Discharge: HOME | End: 2019-07-10
Payer: MEDICARE

## 2019-07-10 LAB
ALBUMIN SERPL-MCNC: 4.2 G/DL (ref 3.2–4.6)
ALBUMIN/GLOB SERPL: 1.9 {RATIO} (ref 1.2–2.2)
ALP SERPL-CCNC: 47 IU/L (ref 39–117)
ALT SERPL-CCNC: 11 IU/L (ref 0–32)
AST SERPL-CCNC: 14 IU/L (ref 0–40)
BASOPHILS # BLD AUTO: 0.1 X10E3/UL (ref 0–0.2)
BASOPHILS NFR BLD AUTO: 1 %
BILIRUB SERPL-MCNC: 0.5 MG/DL (ref 0–1.2)
BUN SERPL-MCNC: 17 MG/DL (ref 10–36)
BUN/CREAT SERPL: 21 (ref 12–28)
CALCIUM SERPL-MCNC: 9.7 MG/DL (ref 8.7–10.3)
CHLORIDE SERPL-SCNC: 103 MMOL/L (ref 96–106)
CO2 SERPL-SCNC: 25 MMOL/L (ref 20–29)
CREAT SERPL-MCNC: 0.81 MG/DL (ref 0.57–1)
EOSINOPHIL # BLD AUTO: 0.1 X10E3/UL (ref 0–0.4)
EOSINOPHIL NFR BLD AUTO: 2 %
ERYTHROCYTE [DISTWIDTH] IN BLOOD BY AUTOMATED COUNT: 13.2 % (ref 12.3–15.4)
GLOBULIN SER CALC-MCNC: 2.2 G/DL (ref 1.5–4.5)
GLUCOSE SERPL-MCNC: 111 MG/DL (ref 65–99)
HCT VFR BLD AUTO: 46.7 % (ref 34–46.6)
HGB BLD-MCNC: 15.8 G/DL (ref 11.1–15.9)
IMM GRANULOCYTES # BLD AUTO: 0 X10E3/UL (ref 0–0.1)
IMM GRANULOCYTES NFR BLD AUTO: 0 %
LYMPHOCYTES # BLD AUTO: 1.2 X10E3/UL (ref 0.7–3.1)
LYMPHOCYTES NFR BLD AUTO: 17 %
MCH RBC QN AUTO: 30.2 PG (ref 26.6–33)
MCHC RBC AUTO-ENTMCNC: 33.8 G/DL (ref 31.5–35.7)
MCV RBC AUTO: 89 FL (ref 79–97)
MONOCYTES # BLD AUTO: 0.6 X10E3/UL (ref 0.1–0.9)
MONOCYTES NFR BLD AUTO: 9 %
NEUTROPHILS # BLD AUTO: 4.9 X10E3/UL (ref 1.4–7)
NEUTROPHILS NFR BLD AUTO: 71 %
PLATELET # BLD AUTO: 202 X10E3/UL (ref 150–450)
POTASSIUM SERPL-SCNC: 4.1 MMOL/L (ref 3.5–5.2)
PROT SERPL-MCNC: 6.4 G/DL (ref 6–8.5)
RBC # BLD AUTO: 5.24 X10E6/UL (ref 3.77–5.28)
SODIUM SERPL-SCNC: 143 MMOL/L (ref 134–144)
TSH SERPL DL<=0.005 MIU/L-ACNC: 1.92 UIU/ML (ref 0.45–4.5)
WBC # BLD AUTO: 6.9 X10E3/UL (ref 3.4–10.8)

## 2019-07-10 PROCEDURE — 36415 COLL VENOUS BLD VENIPUNCTURE: CPT

## 2019-07-10 PROCEDURE — 86480 TB TEST CELL IMMUN MEASURE: CPT

## 2019-07-10 NOTE — TELEPHONE ENCOUNTER
Hu Even still awaiting tb tests - likely can have forms done by first of the week. Saw the lab showing active. Asked if they had done yet. She said her brother took her yesterday to have done. Saw labs done except tb one. Advised her when giving lab slip to her I had put pink sticker on page to show there were 2 separate lab slips. She will bring her in today also.

## 2019-07-10 NOTE — TELEPHONE ENCOUNTER
Spoke with pt's daughter Taylor Swartz in regards to these forms. She wants these filled out. They have been approve at [facility]. She really like [facility] but hasn't heard from them. Advised to wait to see before doing 2 AGUEDA forms. She strongly wants these done.  Will forward to MD.

## 2019-07-11 ENCOUNTER — TELEPHONE (OUTPATIENT)
Dept: INTERNAL MEDICINE CLINIC | Age: 84
End: 2019-07-11

## 2019-07-11 NOTE — TELEPHONE ENCOUNTER
Faxed pt's forms to [address] [place] [address].. [address] [phone]. Confirmation received. Forms sent to stat scan.

## 2019-07-11 NOTE — TELEPHONE ENCOUNTER
Spoke with Byron Yost with [facility] - advised her lab for tb testing has not resulted yet. May take up to 5 days to final. She states ok to send what we have - pending tb testing.  Will forward to MD.

## 2019-07-11 NOTE — TELEPHONE ENCOUNTER
[phone]  [facility]  [phone]  [facility]  Need the H&P for assited living faxed over today sure.

## 2019-07-13 LAB
GAMMA INTERFERON BACKGROUND BLD IA-ACNC: 0.01 IU/ML
M TB IFN-G BLD-IMP: NEGATIVE
M TB IFN-G CD4+ BCKGRND COR BLD-ACNC: 0.02 IU/ML
MITOGEN IGNF BLD-ACNC: >10 IU/ML
QUANTIFERON INCUBATION, QF1T: NORMAL
QUANTIFERON TB2 AG: 0.01 IU/ML
SERVICE CMNT-IMP: NORMAL

## 2019-07-13 RX ORDER — POTASSIUM CHLORIDE 600 MG/1
TABLET, FILM COATED, EXTENDED RELEASE ORAL
Qty: 90 TAB | Refills: 1 | Status: SHIPPED | OUTPATIENT
Start: 2019-07-13 | End: 2021-05-07

## 2019-07-15 ENCOUNTER — TELEPHONE (OUTPATIENT)
Dept: INTERNAL MEDICINE CLINIC | Age: 84
End: 2019-07-15

## 2019-07-15 NOTE — TELEPHONE ENCOUNTER
Faxed pt's quantiferon gold results to [address] [place] [address].. [phone]. Confirmation received. lab result sent to stat scan with rest of forms.

## 2019-07-15 NOTE — TELEPHONE ENCOUNTER
[facility] - [phone]    Need the TB lab test results ASAP  Advise   Attn: Arcelia Bernheim [phone]

## 2019-07-16 NOTE — PROGRESS NOTES
Left detailed message for pt's daughter Alisson -pt's labs look great overall - no TB. All forms have been faxed to [facility].

## 2019-08-23 ENCOUNTER — TELEPHONE (OUTPATIENT)
Dept: INTERNAL MEDICINE CLINIC | Age: 84
End: 2019-08-23

## 2019-08-30 ENCOUNTER — TELEPHONE (OUTPATIENT)
Dept: INTERNAL MEDICINE CLINIC | Age: 84
End: 2019-08-30

## 2019-08-30 RX ORDER — BUSPIRONE HYDROCHLORIDE 10 MG/1
5 TABLET ORAL 3 TIMES DAILY
COMMUNITY
Start: 2019-07-25 | End: 2019-11-14

## 2019-10-08 NOTE — MR AVS SNAPSHOT
-Hypertension is stable.  Continue current treatment regimen.  Dietary sodium restriction.  Weight loss.  Continue current medications.  -Monitor BP  -Continue furosemide, potassium, and valsartan.  Blood pressure will be reassessed in 4 weeks.   [address] 
[phone] Patient: Sarah Mejia MRN:  [facility]:7/54/1845 Visit Information Date & Time Provider Department Dept. Phone Encounter #  
 7/18/2018  2:20 PM Kaylah Conn, [address] [facility] Internal Medicine [phone] [id number] Follow-up Instructions Return if symptoms worsen or fail to improve. Upcoming Health Maintenance Date Due  
 GLAUCOMA SCREENING Q2Y 5/21/1990 MEDICARE YEARLY EXAM 3/14/2018 Influenza Age 5 to Adult 8/1/2018 DTaP/Tdap/Td series (2 - Td) 4/6/2027 Allergies as of 7/18/2018  Review Complete On: 7/18/2018 By: Jeanie Multani Severity Noted Reaction Type Reactions Cephalexin  11/21/2009    Nausea Only Macrobid [Nitrofurantoin Monohyd/m-cryst]  11/21/2009    Myalgia Razadyne [Galantamine]  11/21/2009    Nausea Only Sulfa (Sulfonamide Antibiotics)  11/21/2009    Rash Current Immunizations  Reviewed on 10/17/2017 Name Date Influenza High Dose Vaccine PF 10/17/2017, 1/1/2017, 11/24/2015 Influenza Vaccine Split 10/10/2010 PPD 8/23/2011 Pneumococcal Conjugate (PCV-13) 3/28/2017 Td 5/12/2013 Tdap 4/6/2017 ZZZ-RETIRED (DO NOT USE) Pneumococcal Vaccine (Unspecified Type) 10/27/2005 Zoster 6/1/2007 Not reviewed this visit You Were Diagnosed With   
  
 Codes Comments Lethargic    -  Primary ICD-10-CM: C60.01 ICD-9-CM: 780.79 Moderate dementia with behavioral disturbance     ICD-10-CM: F03.91 
ICD-9-CM: 294.21 Vitals BP Pulse Temp Resp Height(growth percentile) Weight(growth percentile) 110/70 (BP 1 Location: Right arm, BP Patient Position: Sitting) (!) 56 97.6 °F (36.4 °C) (Oral) 16 5' 1\" (1.549 m) 123 lb 6.4 oz (56 kg) SpO2 BMI OB Status Smoking Status 96% 23.32 kg/m2 Postmenopausal Never Smoker Vitals History BMI and BSA Data Body Mass Index Body Surface Area  
 23.32 kg/m 2 1.55 m 2 Preferred Pharmacy Pharmacy Name Phone [facility] #[id number] [phone] Your Updated Medication List  
  
   
This list is accurate as of 7/18/18  3:21 PM.  Always use your most recent med list.  
  
  
  
  
 aspirin delayed-release 81 mg tablet Take  by mouth daily. atenolol 25 mg tablet Commonly known as:  TENORMIN  
TAKE 1 TABLET BY MOUTH EVERY MORNING  
  
 hydroCHLOROthiazide 12.5 mg capsule Commonly known as:  Alvia Boss TAKE ONE CAPSULE BY MOUTH EVERY MORNING  
  
 memantine 10 mg tablet Commonly known as:  Rozina Hurst TAKE 1 TABLET BY MOUTH TWICE A DAY  
  
 multivitamin tablet Commonly known as:  ONE A DAY Take 1 Tab by mouth daily. potassium chloride SR 8 mEq tablet Commonly known as:  KLOR-CON 8  
TAKE 1 TABLET BY MOUTH EVERY DAY  
  
 QUEtiapine 25 mg tablet Commonly known as:  SEROquel Take 0.5 Tabs by mouth nightly. ZyrTEC 10 mg tablet Generic drug:  cetirizine Take  by mouth daily. We Performed the Following CBC WITH AUTOMATED DIFF [61078 CPT(R)] METABOLIC PANEL, COMPREHENSIVE [87133 CPT(R)] UA/M W/RFLX CULTURE, ROUTINE [[id number] Custom] Follow-up Instructions Return if symptoms worsen or fail to improve. Introducing [facility]! [facility] introduces Druidly patient portal. Now you can access parts of your medical record, email your doctor's office, and request medication refills online. 1. In your internet browser, go to [email or web address] 2. Click on the First Time User? Click Here link in the Sign In box. You will see the New Member Sign Up page. 3. Enter your Druidly Access Code exactly as it appears below. You will not need to use this code after youve completed the sign-up process.  If you do not sign up before the expiration date, you must request a new code. 
 
· [email or web address] Access Code: [email or web address] Expires: 10/16/2018  2:30 PM 
 
4. Enter the last four digits of your Social Security Number (xxxx) and Date of Birth (mm/dd/yyyy) as indicated and click Submit. You will be taken to the next sign-up page. 5. Create a ePartnerst ID. This will be your [email or web address] login ID and cannot be changed, so think of one that is secure and easy to remember. 6. Create a [email or web address] password. You can change your password at any time. 7. Enter your Password Reset Question and Answer. This can be used at a later time if you forget your password. 8. Enter your e-mail address. You will receive e-mail notification when new information is available in [address] 9. Click Sign Up. You can now view and download portions of your medical record. 10. Click the Download Summary menu link to download a portable copy of your medical information. If you have questions, please visit the Frequently Asked Questions section of the [email or web address] website. Remember, [email or web address] is NOT to be used for urgent needs. For medical emergencies, dial 911. Now available from your iPhone and Android! Please provide this summary of care documentation to your next provider. Your primary care clinician is listed as SANDER TRUONG. If you have any questions after today's visit, please call [phone].

## 2019-11-05 ENCOUNTER — TELEPHONE (OUTPATIENT)
Dept: INTERNAL MEDICINE CLINIC | Age: 84
End: 2019-11-05

## 2019-11-05 NOTE — TELEPHONE ENCOUNTER
Manuela Valle from [address] [place] [address] [address]     Requesting an order to collect urine -- has increased confusion.     Fax# [phone]

## 2019-11-05 NOTE — TELEPHONE ENCOUNTER
Spoke with Yasmin Logan Lpn with [facility], states Pt. Is more confused the last couple of days, gets agitated, used to be easy to redirect but not at present, VSS, good appetite but 30min. Later tells them she hasn't eaten all day and cries asking for food. VO per Dr Farida Payan for urinalysis and culture. Also spoke with daughter Rah Hirsch states she visited her sat. And thought she seemed fine. Family has chosen to not tell [facility] about DW passing.

## 2019-11-13 ENCOUNTER — TELEPHONE (OUTPATIENT)
Dept: INTERNAL MEDICINE CLINIC | Age: 84
End: 2019-11-13

## 2019-11-13 NOTE — TELEPHONE ENCOUNTER
Teresa with [address], states she fax a POS earlier this week to be signed, and it has not been faxed back, she states she needs this document signed and faxed back asap, because they do not have a current one on file, cinthya fax [phone].

## 2019-11-14 RX ORDER — BUSPIRONE HYDROCHLORIDE 5 MG/1
1 TABLET ORAL 3 TIMES DAILY
COMMUNITY
Start: 2019-08-23 | End: 2020-01-14

## 2020-01-08 ENCOUNTER — DOCUMENTATION ONLY (OUTPATIENT)
Dept: INTERNAL MEDICINE CLINIC | Age: 85
End: 2020-01-08

## 2020-01-08 ENCOUNTER — TELEPHONE (OUTPATIENT)
Dept: INTERNAL MEDICINE CLINIC | Age: 85
End: 2020-01-08

## 2020-01-08 DIAGNOSIS — R05.9 COUGH: Primary | ICD-10-CM

## 2020-01-08 NOTE — TELEPHONE ENCOUNTER
Spoke with Edvin Johansen from [facility]. She explains patient experiencing dry cough and nasal congestion. No fever. Does patient need to be seen or can provider recommended OTC medications.

## 2020-01-08 NOTE — TELEPHONE ENCOUNTER
Spoke with daughter Maximus Rowley), she will reach out to facility. No one has contacted her from facility regarding patient sx. She will call office back.

## 2020-01-08 NOTE — PROGRESS NOTES
Faxed order per provider to [facility] @ [phone], confirmation received.

## 2020-01-14 ENCOUNTER — TELEPHONE (OUTPATIENT)
Dept: INTERNAL MEDICINE CLINIC | Age: 85
End: 2020-01-14

## 2020-01-14 RX ORDER — BUSPIRONE HYDROCHLORIDE 10 MG/1
5 TABLET ORAL 3 TIMES DAILY
Qty: 1 TAB | Refills: 0
Start: 2020-01-14 | End: 2020-02-11

## 2020-01-14 NOTE — TELEPHONE ENCOUNTER
Teresa sims from [facility]  Fax over a POS    And need dr curtis to review  it   And fax it back to [phone]

## 2020-01-15 ENCOUNTER — TELEPHONE (OUTPATIENT)
Dept: INTERNAL MEDICINE CLINIC | Age: 85
End: 2020-01-15

## 2020-01-15 NOTE — TELEPHONE ENCOUNTER
Lana with [address] [facility] of [address]     Ext [phone]    Calling about pt's b/p being a little elevated and pt is complaining about some vision problems.

## 2020-01-15 NOTE — TELEPHONE ENCOUNTER
Spoke with George Orellana LPN at [address], this AM, elevated /99 complaining of vision problems. Took BP at lunch 126/69, pulse 102.

## 2020-01-15 NOTE — TELEPHONE ENCOUNTER
Spoke with Carlos Clay daughter, informed per provider result note. Understanding verbalized.     Call Carlos Clay- if vision sx persist, See ophthalmologist as discussed/directed as well as me for BP

## 2020-01-20 ENCOUNTER — OFFICE VISIT (OUTPATIENT)
Dept: INTERNAL MEDICINE CLINIC | Age: 85
End: 2020-01-20

## 2020-01-20 VITALS
HEART RATE: 73 BPM | HEIGHT: 61 IN | OXYGEN SATURATION: 99 % | SYSTOLIC BLOOD PRESSURE: 153 MMHG | TEMPERATURE: 97.5 F | DIASTOLIC BLOOD PRESSURE: 83 MMHG | BODY MASS INDEX: 23.98 KG/M2 | RESPIRATION RATE: 16 BRPM | WEIGHT: 127 LBS

## 2020-01-20 DIAGNOSIS — I10 ESSENTIAL HYPERTENSION: Primary | ICD-10-CM

## 2020-01-20 RX ORDER — AMLODIPINE BESYLATE 2.5 MG/1
2.5 TABLET ORAL DAILY
Qty: 30 TAB | Refills: 11 | Status: SHIPPED | OUTPATIENT
Start: 2020-01-20 | End: 2021-05-12

## 2020-01-20 NOTE — PROGRESS NOTES
HISTORY OF PRESENT ILLNESS  Shelby Singleton is a 80 y.o. female. Hypertension    The history is provided by the patient ([facility] reports several high readings). This is a chronic problem. The problem has been gradually worsening. Associated symptoms include confusion and blurred vision. Pertinent negatives include no chest pain, no palpitations, no PND and no anxiety. Associated symptoms comments: Vision change resolved  Confusion baseline. Risk factors include hypertension. shx- has a boyfriend at [facility]    Review of Systems   Unable to perform ROS: Dementia   Constitutional: Negative for weight loss. Eyes: Positive for blurred vision. Respiratory: Negative. Cardiovascular: Negative for chest pain, palpitations, leg swelling and PND. Musculoskeletal: Negative for myalgias. Neurological: Negative for focal weakness. Psychiatric/Behavioral: Positive for confusion and memory loss. Physical Exam  Vitals signs and nursing note reviewed. Constitutional:       General: She is not in acute distress. Cardiovascular:      Rate and Rhythm: Normal rate and regular rhythm. Heart sounds: No murmur. No friction rub. No gallop. Pulmonary:      Effort: Pulmonary effort is normal.      Breath sounds: Normal breath sounds. Psychiatric:         Behavior: Behavior normal.         ASSESSMENT and PLAN  Diagnoses and all orders for this visit:    1. Essential hypertension  -     CBC WITH AUTOMATED DIFF  -     METABOLIC PANEL, BASIC  -     amLODIPine (NORVASC) 2.5 mg tablet; Take 1 Tab by mouth daily.  Continue other medications in addition to current changes   -     AMB SUPPLY ORDER- monitor every day BP x 2 wks    Plan was reviewed with patient and family, understanding expressed

## 2020-01-25 LAB
BASOPHILS # BLD AUTO: 0.1 X10E3/UL (ref 0–0.2)
BASOPHILS NFR BLD AUTO: 1 %
BUN SERPL-MCNC: 19 MG/DL (ref 10–36)
BUN/CREAT SERPL: 28 (ref 12–28)
CALCIUM SERPL-MCNC: 9.5 MG/DL (ref 8.7–10.3)
CHLORIDE SERPL-SCNC: 101 MMOL/L (ref 96–106)
CO2 SERPL-SCNC: 23 MMOL/L (ref 20–29)
CREAT SERPL-MCNC: 0.69 MG/DL (ref 0.57–1)
EOSINOPHIL # BLD AUTO: 0.2 X10E3/UL (ref 0–0.4)
EOSINOPHIL NFR BLD AUTO: 3 %
ERYTHROCYTE [DISTWIDTH] IN BLOOD BY AUTOMATED COUNT: 13.1 % (ref 11.7–15.4)
GLUCOSE SERPL-MCNC: 104 MG/DL (ref 65–99)
HCT VFR BLD AUTO: 45.6 % (ref 34–46.6)
HGB BLD-MCNC: 15.8 G/DL (ref 11.1–15.9)
IMM GRANULOCYTES # BLD AUTO: 0 X10E3/UL (ref 0–0.1)
IMM GRANULOCYTES NFR BLD AUTO: 0 %
LYMPHOCYTES # BLD AUTO: 1.4 X10E3/UL (ref 0.7–3.1)
LYMPHOCYTES NFR BLD AUTO: 17 %
MCH RBC QN AUTO: 31.2 PG (ref 26.6–33)
MCHC RBC AUTO-ENTMCNC: 34.6 G/DL (ref 31.5–35.7)
MCV RBC AUTO: 90 FL (ref 79–97)
MONOCYTES # BLD AUTO: 0.9 X10E3/UL (ref 0.1–0.9)
MONOCYTES NFR BLD AUTO: 11 %
NEUTROPHILS # BLD AUTO: 5.6 X10E3/UL (ref 1.4–7)
NEUTROPHILS NFR BLD AUTO: 68 %
PLATELET # BLD AUTO: 228 X10E3/UL (ref 150–450)
POTASSIUM SERPL-SCNC: 3.9 MMOL/L (ref 3.5–5.2)
RBC # BLD AUTO: 5.07 X10E6/UL (ref 3.77–5.28)
SODIUM SERPL-SCNC: 140 MMOL/L (ref 134–144)
WBC # BLD AUTO: 8.2 X10E3/UL (ref 3.4–10.8)

## 2020-01-28 LAB
EST. AVERAGE GLUCOSE BLD GHB EST-MCNC: 97 MG/DL
HBA1C MFR BLD: 5 % (ref 4.8–5.6)
SPECIMEN STATUS REPORT, ROLRST: NORMAL

## 2020-02-11 RX ORDER — GUAIFENESIN DEXTROMETHORPHAN HYDROBROMIDE ORAL SOLUTION 10; 100 MG/5ML; MG/5ML
10 SOLUTION ORAL
Qty: 1 ML | Refills: 0
Start: 2020-02-11 | End: 2020-08-27

## 2020-02-11 RX ORDER — BUSPIRONE HYDROCHLORIDE 10 MG/1
10 TABLET ORAL 3 TIMES DAILY
Qty: 1 TAB | Refills: 0
Start: 2020-02-11

## 2020-03-09 ENCOUNTER — TELEPHONE (OUTPATIENT)
Dept: INTERNAL MEDICINE CLINIC | Age: 85
End: 2020-03-09

## 2020-03-09 DIAGNOSIS — R41.0 CONFUSION: Primary | ICD-10-CM
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
## 2020-03-09 NOTE — TELEPHONE ENCOUNTER
Spoke with nurse Tyrel Colidnres with [address], approval for urinalysis and culture to be faxed. She explained sedative is not requested. Follow up with Dr Lizeth Patel.  Notified daughter

## 2020-03-09 NOTE — TELEPHONE ENCOUNTER
Pt's daughter says that her mother is at [facility] and they think that her mother may have a uti. They will need an order to test for this.

## 2020-03-23 ENCOUNTER — TELEPHONE (OUTPATIENT)
Dept: INTERNAL MEDICINE CLINIC | Age: 85
End: 2020-03-23

## 2020-03-23 DIAGNOSIS — N30.90 BLADDER INFECTION: Primary | ICD-10-CM

## 2020-03-23 RX ORDER — CEFUROXIME AXETIL 250 MG/1
250 TABLET ORAL 2 TIMES DAILY
Qty: 10 TAB | Refills: 0
Start: 2020-03-23 | End: 2020-03-28

## 2020-03-23 RX ORDER — CEPHALEXIN 250 MG/1
250 CAPSULE ORAL 2 TIMES DAILY
Qty: 10 CAP | Refills: 0
Start: 2020-03-23 | End: 2020-03-23 | Stop reason: CLARIF

## 2020-03-23 NOTE — TELEPHONE ENCOUNTER
Teresa with [address] [address] states, Dr. Kushal Montana prescribed ceftin for [facility] today, but she sees that the patient has an allergy to cephalexin. She would like to know if it is ok for the patient to take the script for ceftin, please advise.

## 2020-03-23 NOTE — TELEPHONE ENCOUNTER
Notified AGUEDA, spoke with Nurse Kimmy Foster, informed provider aware of allergy. [id number] Nayeli Keith for patient to take.

## 2020-03-23 NOTE — TELEPHONE ENCOUNTER
Spoke with Nurse Toya Sanchez at [address], provided with fax # to send results of urine sample.      Received and forward to MD.

## 2020-03-23 NOTE — TELEPHONE ENCOUNTER
Please call re: results of urine test.  She said that she has tried to fax results but couldn't get it to go through. She said she would try again but would like a call to confirm that you have received them.

## 2020-05-06 ENCOUNTER — TELEPHONE (OUTPATIENT)
Dept: INTERNAL MEDICINE CLINIC | Age: 85
End: 2020-05-06

## 2020-06-11 ENCOUNTER — TELEPHONE (OUTPATIENT)
Dept: INTERNAL MEDICINE CLINIC | Age: 85
End: 2020-06-11

## 2020-06-11 NOTE — TELEPHONE ENCOUNTER
Spoke with Teresa at [facility]. She was requesting to send fax (in MD folder to sign) order to stop pt's Robafen DM. Pt has not used over 9 months and policy is to discontinue. Advised her Dr Josephine Hickman is out of the office until 6/15/20. I have forward your message to him to review once her returns.

## 2020-06-11 NOTE — TELEPHONE ENCOUNTER
Hesham Jordan [phone]  [facility]    Please sign order to discontinue the Robasen cough syrup fax [phone]

## 2020-08-27 RX ORDER — LORATADINE 10 MG/1
10 TABLET ORAL DAILY
Qty: 1 TAB | Refills: 0
Start: 2020-08-27

## 2020-08-31 ENCOUNTER — TELEPHONE (OUTPATIENT)
Dept: INTERNAL MEDICINE CLINIC | Age: 85
End: 2020-08-31

## 2020-08-31 NOTE — TELEPHONE ENCOUNTER
Patient's daughter called to say [address] called and said her mother is acting agitated and confused, they think she has a uti. Asking if Dr. [place] will send an order to test her urine.     Ph# [phone] ( did not have the fax#)

## 2020-09-01 DIAGNOSIS — N39.0 URINARY TRACT INFECTION WITHOUT HEMATURIA, SITE UNSPECIFIED: Primary | ICD-10-CM

## 2020-09-01 NOTE — TELEPHONE ENCOUNTER
Spoke with Kiran Bustillos LPN caring for pt. Advised her MD has approved request for urinalysis and urine culture. She would like to have order fax to her attn [phone]. Order faxed - confirmation received.

## 2020-09-04 ENCOUNTER — TELEPHONE (OUTPATIENT)
Dept: INTERNAL MEDICINE CLINIC | Age: 85
End: 2020-09-04
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
## 2020-09-04 NOTE — TELEPHONE ENCOUNTER
Martin Olguin from [address] [place] [address] of [address]     Patient fell this morning, tripped and hit right side of head. There is no swelling, bruising or bleeding.

## 2020-09-04 NOTE — TELEPHONE ENCOUNTER
Teresa called back and states they need the order faxed over to keep a hard copy.    Fax: [phone]

## 2020-09-04 NOTE — TELEPHONE ENCOUNTER
Spoke with Teresa their facility does neuro checks q2hr. So neuro checks will be done q2hrs x 48hrs and to call on call or ER if any problems.

## 2020-09-08 ENCOUNTER — TELEPHONE (OUTPATIENT)
Dept: INTERNAL MEDICINE CLINIC | Age: 85
End: 2020-09-08

## 2020-09-08 DIAGNOSIS — N39.0 URINARY TRACT INFECTION WITHOUT HEMATURIA, SITE UNSPECIFIED: Primary | ICD-10-CM

## 2020-09-08 NOTE — TELEPHONE ENCOUNTER
Faxed to [address] [facility] [address] new orders for pt - Doxycycline 100 mg bid x5 days, repeat urinalysis and urine culture in 2 weeks. Sent to Gustabo Medeiros [phone]. Confirmation received.

## 2020-09-08 NOTE — TELEPHONE ENCOUNTER
lyle with [address] [facility] of [address]     Still has not received the order for antibiotics.       Fax# [phone]

## 2020-09-24 ENCOUNTER — TELEPHONE (OUTPATIENT)
Dept: INTERNAL MEDICINE CLINIC | Age: 85
End: 2020-09-24

## 2020-09-24 ENCOUNTER — APPOINTMENT (OUTPATIENT)
Dept: GENERAL RADIOLOGY | Age: 85
End: 2020-09-24
Attending: EMERGENCY MEDICINE
Payer: MEDICARE

## 2020-09-24 ENCOUNTER — HOSPITAL ENCOUNTER (EMERGENCY)
Age: 85
Discharge: HOME | End: 2020-09-24
Attending: EMERGENCY MEDICINE | Admitting: EMERGENCY MEDICINE
Payer: MEDICARE

## 2020-09-24 VITALS
OXYGEN SATURATION: 99 % | TEMPERATURE: 98 F | SYSTOLIC BLOOD PRESSURE: 155 MMHG | HEART RATE: 85 BPM | RESPIRATION RATE: 17 BRPM | DIASTOLIC BLOOD PRESSURE: 59 MMHG

## 2020-09-24 DIAGNOSIS — R07.9 CHEST PAIN, UNSPECIFIED TYPE: Primary | ICD-10-CM

## 2020-09-24 LAB
ALBUMIN SERPL-MCNC: 3.5 G/DL (ref 3.5–5)
ALBUMIN/GLOB SERPL: 1 {RATIO} (ref 1.1–2.2)
ALP SERPL-CCNC: 72 U/L (ref 45–117)
ALT SERPL-CCNC: 23 U/L (ref 12–78)
ANION GAP SERPL CALC-SCNC: 6 MMOL/L (ref 5–15)
AST SERPL-CCNC: 17 U/L (ref 15–37)
ATRIAL RATE: 91 BPM
BASOPHILS # BLD: 0.1 K/UL (ref 0–0.1)
BASOPHILS NFR BLD: 1 % (ref 0–1)
BILIRUB SERPL-MCNC: 0.3 MG/DL (ref 0.2–1)
BUN SERPL-MCNC: 27 MG/DL (ref 6–20)
BUN/CREAT SERPL: 25 (ref 12–20)
CALCIUM SERPL-MCNC: 9.3 MG/DL (ref 8.5–10.1)
CALCULATED P AXIS, ECG09: 64 DEGREES
CALCULATED R AXIS, ECG10: 55 DEGREES
CALCULATED T AXIS, ECG11: -4 DEGREES
CHLORIDE SERPL-SCNC: 105 MMOL/L (ref 97–108)
CO2 SERPL-SCNC: 29 MMOL/L (ref 21–32)
COMMENT, HOLDF: NORMAL
CREAT SERPL-MCNC: 1.1 MG/DL (ref 0.55–1.02)
DIAGNOSIS, 93000: NORMAL
DIFFERENTIAL METHOD BLD: ABNORMAL
EOSINOPHIL # BLD: 0.1 K/UL (ref 0–0.4)
EOSINOPHIL NFR BLD: 2 % (ref 0–7)
ERYTHROCYTE [DISTWIDTH] IN BLOOD BY AUTOMATED COUNT: 14 % (ref 11.5–14.5)
GLOBULIN SER CALC-MCNC: 3.4 G/DL (ref 2–4)
GLUCOSE SERPL-MCNC: 129 MG/DL (ref 65–100)
HCT VFR BLD AUTO: 46.4 % (ref 35–47)
HGB BLD-MCNC: 14.8 G/DL (ref 11.5–16)
IMM GRANULOCYTES # BLD AUTO: 0 K/UL (ref 0–0.04)
IMM GRANULOCYTES NFR BLD AUTO: 1 % (ref 0–0.5)
LIPASE SERPL-CCNC: 178 U/L (ref 73–393)
LYMPHOCYTES # BLD: 0.9 K/UL (ref 0.8–3.5)
LYMPHOCYTES NFR BLD: 10 % (ref 12–49)
MAGNESIUM SERPL-MCNC: 2 MG/DL (ref 1.6–2.4)
MCH RBC QN AUTO: 30.2 PG (ref 26–34)
MCHC RBC AUTO-ENTMCNC: 31.9 G/DL (ref 30–36.5)
MCV RBC AUTO: 94.7 FL (ref 80–99)
MONOCYTES # BLD: 0.8 K/UL (ref 0–1)
MONOCYTES NFR BLD: 9 % (ref 5–13)
NEUTS SEG # BLD: 6.9 K/UL (ref 1.8–8)
NEUTS SEG NFR BLD: 77 % (ref 32–75)
NRBC # BLD: 0 K/UL (ref 0–0.01)
NRBC BLD-RTO: 0 PER 100 WBC
P-R INTERVAL, ECG05: 154 MS
PLATELET # BLD AUTO: 173 K/UL (ref 150–400)
PMV BLD AUTO: 10.9 FL (ref 8.9–12.9)
POTASSIUM SERPL-SCNC: 3.6 MMOL/L (ref 3.5–5.1)
PROT SERPL-MCNC: 6.9 G/DL (ref 6.4–8.2)
Q-T INTERVAL, ECG07: 384 MS
QRS DURATION, ECG06: 120 MS
QTC CALCULATION (BEZET), ECG08: 472 MS
RBC # BLD AUTO: 4.9 M/UL (ref 3.8–5.2)
SAMPLES BEING HELD,HOLD: NORMAL
SODIUM SERPL-SCNC: 140 MMOL/L (ref 136–145)
TROPONIN I SERPL-MCNC: <0.05 NG/ML
VENTRICULAR RATE, ECG03: 91 BPM
WBC # BLD AUTO: 8.8 K/UL (ref 3.6–11)

## 2020-09-24 PROCEDURE — 71046 X-RAY EXAM CHEST 2 VIEWS: CPT

## 2020-09-24 PROCEDURE — 80053 COMPREHEN METABOLIC PANEL: CPT

## 2020-09-24 PROCEDURE — 83690 ASSAY OF LIPASE: CPT

## 2020-09-24 PROCEDURE — 83735 ASSAY OF MAGNESIUM: CPT

## 2020-09-24 PROCEDURE — 84484 ASSAY OF TROPONIN QUANT: CPT

## 2020-09-24 PROCEDURE — 99285 EMERGENCY DEPT VISIT HI MDM: CPT

## 2020-09-24 PROCEDURE — 36415 COLL VENOUS BLD VENIPUNCTURE: CPT

## 2020-09-24 PROCEDURE — 85025 COMPLETE CBC W/AUTO DIFF WBC: CPT

## 2020-09-24 PROCEDURE — 93005 ELECTROCARDIOGRAM TRACING: CPT

## 2020-09-24 NOTE — DISCHARGE INSTRUCTIONS
It is unclear what caused the chest pain today but it may be that your mother choked on food. It is also possible that the pain was caused by the heart. It is very important that your return to the ED if your pain returns or you develop any new or worsening symptoms.

## 2020-09-24 NOTE — ED TRIAGE NOTES
Pt to ED via EMS from [facility]. Pt had an 40 minute episode of CP, back pain, and coughing. Pt states all symptoms have now resolves. No medical complaints currently. PMH of dementia at baseline.

## 2020-09-24 NOTE — TELEPHONE ENCOUNTER
Around 1:30 pm was asked to triage call - spoke with Thaddeus with [facility]. She states pt has been complaining of chest pain that radiates to her back and has been vomiting up phlegm for past 30 minutes. Pain is constant. No other symptoms. Vital are 142/99 - Pulse 76 - Resp 22 Temp 97. 5. advised her to send pt to ER for further evaluation.  Will forward to MD.

## 2020-09-24 NOTE — TELEPHONE ENCOUNTER
Thaddeus with [address] [facility] of [address] [place] [address]     Ext CCA    Calling to speak with nurse -- patient started with chest pain and vomiting 30 mins ago. Eloise Burnham was in the phone room, so spoke with the nurse and advised to send pt to [facility] [place].

## 2020-09-24 NOTE — ED PROVIDER NOTES
Patient is a 55-year-old female with past medical history significant for hypertension, memory loss, who was brought by EMS for evaluation of approximately 40 minutes of chest pain. EMS reports that she had associated back pain and coughing. Patient has no memory of this. Patient's son states he was only told that she had the chest pain and that maybe she vomited but he does state that the nurse called him had a thick accent he had a hard time understanding what she was saying. Patient denies any complaints at this time.   The son states that she has no history of heart disease and does not have a cardiologist.  The son states that he does have a history of chronic pain in her right hip           Past Medical History:   Diagnosis Date    DJD (degenerative joint disease)     Hematuria, microscopic     Hypertension     Memory loss     Menopause     Osteopenia        Past Surgical History:   Procedure Laterality Date    HX HYSTERECTOMY           Family History:   Problem Relation Age of Onset    Heart Disease Mother         congestive heart failure    Stroke Father     Alcohol abuse Son     Alcohol abuse Daughter        Social History     Socioeconomic History    Marital status:      Spouse name: Not on file    Number of children: Not on file    Years of education: Not on file    Highest education level: Not on file   Occupational History    Not on file   Social Needs    Financial resource strain: Not on file    Food insecurity     Worry: Not on file     Inability: Not on file    Transportation needs     Medical: Not on file     Non-medical: Not on file   Tobacco Use    Smoking status: Never Smoker    Smokeless tobacco: Never Used   Substance and Sexual Activity    Alcohol use: No     Alcohol/week: 0.0 standard drinks    Drug use: No    Sexual activity: Never   Lifestyle    Physical activity     Days per week: Not on file     Minutes per session: Not on file    Stress: Not on file Relationships    Social connections     Talks on phone: Not on file     Gets together: Not on file     Attends Synagogue service: Not on file     Active member of club or organization: Not on file     Attends meetings of clubs or organizations: Not on file     Relationship status: Not on file    Intimate partner violence     Fear of current or ex partner: Not on file     Emotionally abused: Not on file     Physically abused: Not on file     Forced sexual activity: Not on file   Other Topics Concern    Not on file   Social History Narrative    Not on file         ALLERGIES: Cephalexin; Macrobid [nitrofurantoin monohyd/m-cryst]; Razadyne [galantamine]; and Sulfa (sulfonamide antibiotics)    Review of Systems   Unable to perform ROS: Dementia       There were no vitals filed for this visit. Physical Exam  Vitals signs and nursing note reviewed. Constitutional:       Appearance: She is well-developed. She is not toxic-appearing or diaphoretic. Comments: Elderly   HENT:      Head: Normocephalic and atraumatic. Eyes:      Extraocular Movements: Extraocular movements intact. Neck:      Trachea: No tracheal deviation. Cardiovascular:      Rate and Rhythm: Normal rate and regular rhythm. Heart sounds: No friction rub. Pulmonary:      Effort: Pulmonary effort is normal.      Breath sounds: Normal breath sounds. No decreased breath sounds. Chest:      Chest wall: No deformity, tenderness or crepitus. Abdominal:      Palpations: Abdomen is soft. Tenderness: There is no guarding or rebound. Musculoskeletal:      Right lower leg: She exhibits no tenderness. Left lower leg: She exhibits no tenderness. Skin:     General: Skin is warm and dry. Neurological:      Mental Status: She is alert and oriented to person, place, and time.    Psychiatric:         Mood and Affect: Mood normal.         Behavior: Behavior normal.          MDM  Number of Diagnoses or Management Options  Chest pain, unspecified type: new and requires workup  Diagnosis management comments: Unclear what caused the patient's episode of chest pain. According to EMS report he was associated with cough and may very well been related to choking on something she was eating. No evidence of aspiration noted on chest x-ray and lung exam is clear. However it is possible that she had a cardiac event or even some kind of arrhythmia. Power of  her son was notified of such. Son at bedside states that she has been without complaint since she arrived in the ER and they would like to be discharged home. They declined admission to the hospital for further observation and work-up. He states he does not want to be super aggressive with work-up at this time. Patient son given strict warning signs and symptoms that she would need to return for and they were also advised that if they change her mind about further observation in the hospital that they were welcome to return at any time. Patient denies any complaint at this time and states she wants to go home. Amount and/or Complexity of Data Reviewed  Clinical lab tests: reviewed and ordered  Tests in the radiology section of CPT®: ordered and reviewed  Decide to obtain previous medical records or to obtain history from someone other than the patient: yes  Obtain history from someone other than the patient: yes           Procedures    EKG obtained at 1439 showing sinus rhythm, rate 91, multiple premature atrial complexes as well as premature ventricular complexes which are new, right bundle branch block which remains and T wave abnormalities which is unchanged from prior EKG.

## 2020-09-28 ENCOUNTER — TELEPHONE (OUTPATIENT)
Dept: INTERNAL MEDICINE CLINIC | Age: 85
End: 2020-09-28

## 2020-09-28 NOTE — TELEPHONE ENCOUNTER
Teresa with [address] [facility] of [address]     Faxed request on 9/23 for pt to have flu shot in October. Please sign and fax the order back.

## 2020-09-29 NOTE — TELEPHONE ENCOUNTER
Spoke with Teresa with [address]. Advised her MD did not receive fax for pt to get flu vaccine. She will refax.

## 2020-10-05 ENCOUNTER — TELEPHONE (OUTPATIENT)
Dept: INTERNAL MEDICINE CLINIC | Age: 85
End: 2020-10-05

## 2020-10-05 NOTE — TELEPHONE ENCOUNTER
----- Message from Kasandra Gonsalez MD sent at 10/5/2020  6:09 PM EDT -----  Regarding: FW: acs  Excellent research or did they point this out after my ranting and raving ? ? Send in ceftin 250 mg bid x 5 days. Tell them her cephalexin allergy is a side effect, not allergy, so she can have this one.  ----- Message -----  From: Kwan Bowden LPN  Sent: 97/0/5323   4:49 PM EDT  To: Kasandra Gonsalez MD  Subject: FW: acs                                          See AMB Supply order from 9/8/20. You asked me to send the following:  New orders for pt at [address]:   1- start Doxycycline 100 mg BID x5 days. 2- recheck urinalysis and urine culture in 2 weeks. Send results to Dr Javid Swan.  ----- Message -----  From: Miki Talbot MD  Sent: 10/5/2020  12:29 PM EDT  To: Carlene Martin Team Three Pool  Subject: acs                                              Call her [facility]- another doctor ordered a urinalysis, but they've sent me the result ! Why did they do this ? ? Why was it ordered ? Is she on medication from the other doctor ?   So many questions generated when someone does the wrong thing !!

## 2020-10-05 NOTE — TELEPHONE ENCOUNTER
Spoke with Nancy Arita LPN caring for pt. Advised her MD received urinalysis and urine culture. He wants pt to start Ceftin 250 mg bid x 5 days - vo given. Advised her per MD that pt's cephalexin allergy is a side effect not an allergy so she can have this one. Adolfo Baker will write order and fax for MD to sign tomorrow.  Will forward to MD.

## 2020-11-04 ENCOUNTER — TELEPHONE (OUTPATIENT)
Dept: INTERNAL MEDICINE CLINIC | Age: 85
End: 2020-11-04

## 2020-11-04 NOTE — TELEPHONE ENCOUNTER
Randall Flores from [facility] [address]     Ext 132    Noticed some redness under pt's right breast -- requesting a faxed order for nystatin powder.     Fax# [phone]

## 2020-11-09 ENCOUNTER — HOSPITAL ENCOUNTER (INPATIENT)
Age: 85
LOS: 4 days | Discharge: NURSING, REHAB OR LONG-TERM CARE | DRG: 522 | End: 2020-11-13
Attending: EMERGENCY MEDICINE | Admitting: ORTHOPAEDIC SURGERY
Payer: MEDICARE

## 2020-11-09 ENCOUNTER — TELEPHONE (OUTPATIENT)
Dept: INTERNAL MEDICINE CLINIC | Age: 85
End: 2020-11-09

## 2020-11-09 ENCOUNTER — APPOINTMENT (OUTPATIENT)
Dept: GENERAL RADIOLOGY | Age: 85
DRG: 522 | End: 2020-11-09
Attending: EMERGENCY MEDICINE
Payer: MEDICARE

## 2020-11-09 ENCOUNTER — APPOINTMENT (OUTPATIENT)
Dept: CT IMAGING | Age: 85
DRG: 522 | End: 2020-11-09
Attending: FAMILY MEDICINE
Payer: MEDICARE

## 2020-11-09 DIAGNOSIS — S72.001A CLOSED FRACTURE OF NECK OF RIGHT FEMUR, INITIAL ENCOUNTER (HCC): Primary | ICD-10-CM

## 2020-11-09 PROBLEM — S72.90XA FEMUR FRACTURE (HCC): Status: ACTIVE | Noted: 2020-11-09

## 2020-11-09 LAB
ABO + RH BLD: NORMAL
ALBUMIN SERPL-MCNC: 3.8 G/DL (ref 3.5–5)
ALBUMIN/GLOB SERPL: 1.1 {RATIO} (ref 1.1–2.2)
ALP SERPL-CCNC: 74 U/L (ref 45–117)
ALT SERPL-CCNC: 18 U/L (ref 12–78)
ANION GAP SERPL CALC-SCNC: 11 MMOL/L (ref 5–15)
AST SERPL-CCNC: 18 U/L (ref 15–37)
BASOPHILS # BLD: 0.1 K/UL (ref 0–0.1)
BASOPHILS NFR BLD: 1 % (ref 0–1)
BILIRUB SERPL-MCNC: 0.8 MG/DL (ref 0.2–1)
BLOOD GROUP ANTIBODIES SERPL: NORMAL
BUN SERPL-MCNC: 20 MG/DL (ref 6–20)
BUN/CREAT SERPL: 21 (ref 12–20)
CALCIUM SERPL-MCNC: 9.5 MG/DL (ref 8.5–10.1)
CHLORIDE SERPL-SCNC: 105 MMOL/L (ref 97–108)
CO2 SERPL-SCNC: 25 MMOL/L (ref 21–32)
COMMENT, HOLDF: NORMAL
COVID-19 RAPID TEST, COVR: NOT DETECTED
CREAT SERPL-MCNC: 0.95 MG/DL (ref 0.55–1.02)
DIFFERENTIAL METHOD BLD: ABNORMAL
EOSINOPHIL # BLD: 0.1 K/UL (ref 0–0.4)
EOSINOPHIL NFR BLD: 1 % (ref 0–7)
ERYTHROCYTE [DISTWIDTH] IN BLOOD BY AUTOMATED COUNT: 13.6 % (ref 11.5–14.5)
GLOBULIN SER CALC-MCNC: 3.5 G/DL (ref 2–4)
GLUCOSE SERPL-MCNC: 121 MG/DL (ref 65–100)
HCT VFR BLD AUTO: 47.9 % (ref 35–47)
HEALTH STATUS, XMCV2T: NORMAL
HGB BLD-MCNC: 15.6 G/DL (ref 11.5–16)
IMM GRANULOCYTES # BLD AUTO: 0.1 K/UL (ref 0–0.04)
IMM GRANULOCYTES NFR BLD AUTO: 1 % (ref 0–0.5)
LYMPHOCYTES # BLD: 1.6 K/UL (ref 0.8–3.5)
LYMPHOCYTES NFR BLD: 18 % (ref 12–49)
MCH RBC QN AUTO: 30.1 PG (ref 26–34)
MCHC RBC AUTO-ENTMCNC: 32.6 G/DL (ref 30–36.5)
MCV RBC AUTO: 92.3 FL (ref 80–99)
MONOCYTES # BLD: 0.8 K/UL (ref 0–1)
MONOCYTES NFR BLD: 9 % (ref 5–13)
NEUTS SEG # BLD: 6.4 K/UL (ref 1.8–8)
NEUTS SEG NFR BLD: 70 % (ref 32–75)
NRBC # BLD: 0 K/UL (ref 0–0.01)
NRBC BLD-RTO: 0 PER 100 WBC
PLATELET # BLD AUTO: 224 K/UL (ref 150–400)
PMV BLD AUTO: 9.8 FL (ref 8.9–12.9)
POTASSIUM SERPL-SCNC: 3.2 MMOL/L (ref 3.5–5.1)
PROT SERPL-MCNC: 7.3 G/DL (ref 6.4–8.2)
RBC # BLD AUTO: 5.19 M/UL (ref 3.8–5.2)
SAMPLES BEING HELD,HOLD: NORMAL
SODIUM SERPL-SCNC: 141 MMOL/L (ref 136–145)
SOURCE, COVRS: NORMAL
SPECIMEN EXP DATE BLD: NORMAL
SPECIMEN SOURCE, FCOV2M: NORMAL
SPECIMEN TYPE, XMCV1T: NORMAL
WBC # BLD AUTO: 9 K/UL (ref 3.6–11)

## 2020-11-09 PROCEDURE — 81001 URINALYSIS AUTO W/SCOPE: CPT

## 2020-11-09 PROCEDURE — 96374 THER/PROPH/DIAG INJ IV PUSH: CPT

## 2020-11-09 PROCEDURE — 96375 TX/PRO/DX INJ NEW DRUG ADDON: CPT

## 2020-11-09 PROCEDURE — 86900 BLOOD TYPING SEROLOGIC ABO: CPT

## 2020-11-09 PROCEDURE — 99285 EMERGENCY DEPT VISIT HI MDM: CPT

## 2020-11-09 PROCEDURE — 36415 COLL VENOUS BLD VENIPUNCTURE: CPT

## 2020-11-09 PROCEDURE — 73502 X-RAY EXAM HIP UNI 2-3 VIEWS: CPT

## 2020-11-09 PROCEDURE — 74011250636 HC RX REV CODE- 250/636: Performed by: EMERGENCY MEDICINE

## 2020-11-09 PROCEDURE — 80053 COMPREHEN METABOLIC PANEL: CPT

## 2020-11-09 PROCEDURE — 85610 PROTHROMBIN TIME: CPT

## 2020-11-09 PROCEDURE — 74011250636 HC RX REV CODE- 250/636: Performed by: FAMILY MEDICINE

## 2020-11-09 PROCEDURE — 74011250637 HC RX REV CODE- 250/637: Performed by: FAMILY MEDICINE

## 2020-11-09 PROCEDURE — 87635 SARS-COV-2 COVID-19 AMP PRB: CPT

## 2020-11-09 PROCEDURE — 77030040830 HC CATH URETH FOL MDII -A

## 2020-11-09 PROCEDURE — 65270000029 HC RM PRIVATE

## 2020-11-09 PROCEDURE — 85025 COMPLETE CBC W/AUTO DIFF WBC: CPT

## 2020-11-09 PROCEDURE — 96376 TX/PRO/DX INJ SAME DRUG ADON: CPT

## 2020-11-09 PROCEDURE — 93005 ELECTROCARDIOGRAM TRACING: CPT

## 2020-11-09 RX ORDER — QUETIAPINE FUMARATE 25 MG/1
12.5 TABLET, FILM COATED ORAL
Status: DISCONTINUED | OUTPATIENT
Start: 2020-11-09 | End: 2020-11-13 | Stop reason: HOSPADM

## 2020-11-09 RX ORDER — SODIUM CHLORIDE 0.9 % (FLUSH) 0.9 %
5-40 SYRINGE (ML) INJECTION AS NEEDED
Status: DISCONTINUED | OUTPATIENT
Start: 2020-11-09 | End: 2020-11-13 | Stop reason: HOSPADM

## 2020-11-09 RX ORDER — FENTANYL CITRATE 50 UG/ML
25 INJECTION, SOLUTION INTRAMUSCULAR; INTRAVENOUS ONCE
Status: COMPLETED | OUTPATIENT
Start: 2020-11-09 | End: 2020-11-09

## 2020-11-09 RX ORDER — RISPERIDONE 0.5 MG/1
0.25 TABLET, FILM COATED ORAL
Status: DISCONTINUED | OUTPATIENT
Start: 2020-11-10 | End: 2020-11-13 | Stop reason: HOSPADM

## 2020-11-09 RX ORDER — AMLODIPINE BESYLATE 5 MG/1
2.5 TABLET ORAL DAILY
Status: DISCONTINUED | OUTPATIENT
Start: 2020-11-10 | End: 2020-11-13 | Stop reason: HOSPADM

## 2020-11-09 RX ORDER — SODIUM CHLORIDE 9 MG/ML
75 INJECTION, SOLUTION INTRAVENOUS CONTINUOUS
Status: DISCONTINUED | OUTPATIENT
Start: 2020-11-09 | End: 2020-11-13 | Stop reason: HOSPADM

## 2020-11-09 RX ORDER — SODIUM CHLORIDE 0.9 % (FLUSH) 0.9 %
5-40 SYRINGE (ML) INJECTION EVERY 8 HOURS
Status: DISCONTINUED | OUTPATIENT
Start: 2020-11-09 | End: 2020-11-13 | Stop reason: HOSPADM

## 2020-11-09 RX ORDER — MEMANTINE HYDROCHLORIDE 10 MG/1
10 TABLET ORAL EVERY 12 HOURS
Status: DISCONTINUED | OUTPATIENT
Start: 2020-11-09 | End: 2020-11-13 | Stop reason: HOSPADM

## 2020-11-09 RX ORDER — BUSPIRONE HYDROCHLORIDE 10 MG/1
10 TABLET ORAL 3 TIMES DAILY
Status: DISCONTINUED | OUTPATIENT
Start: 2020-11-09 | End: 2020-11-13 | Stop reason: HOSPADM

## 2020-11-09 RX ORDER — POTASSIUM CHLORIDE 7.45 MG/ML
10 INJECTION INTRAVENOUS ONCE
Status: COMPLETED | OUTPATIENT
Start: 2020-11-09 | End: 2020-11-09

## 2020-11-09 RX ORDER — MORPHINE SULFATE 2 MG/ML
1 INJECTION, SOLUTION INTRAMUSCULAR; INTRAVENOUS
Status: DISCONTINUED | OUTPATIENT
Start: 2020-11-09 | End: 2020-11-10

## 2020-11-09 RX ORDER — RISPERIDONE 0.25 MG/1
0.25 TABLET, FILM COATED ORAL
COMMUNITY

## 2020-11-09 RX ORDER — NALOXONE HYDROCHLORIDE 0.4 MG/ML
0.4 INJECTION, SOLUTION INTRAMUSCULAR; INTRAVENOUS; SUBCUTANEOUS AS NEEDED
Status: DISCONTINUED | OUTPATIENT
Start: 2020-11-09 | End: 2020-11-13 | Stop reason: HOSPADM

## 2020-11-09 RX ADMIN — SODIUM CHLORIDE 75 ML/HR: 900 INJECTION, SOLUTION INTRAVENOUS at 17:02

## 2020-11-09 RX ADMIN — MEMANTINE HYDROCHLORIDE 10 MG: 10 TABLET ORAL at 20:48

## 2020-11-09 RX ADMIN — BUSPIRONE HYDROCHLORIDE 10 MG: 10 TABLET ORAL at 20:49

## 2020-11-09 RX ADMIN — MORPHINE SULFATE 1 MG: 2 INJECTION, SOLUTION INTRAMUSCULAR; INTRAVENOUS at 17:03

## 2020-11-09 RX ADMIN — POTASSIUM CHLORIDE 10 MEQ: 7.46 INJECTION, SOLUTION INTRAVENOUS at 17:02

## 2020-11-09 RX ADMIN — FENTANYL CITRATE 25 MCG: 50 INJECTION, SOLUTION INTRAMUSCULAR; INTRAVENOUS at 11:11

## 2020-11-09 RX ADMIN — QUETIAPINE FUMARATE 12.5 MG: 25 TABLET ORAL at 20:49

## 2020-11-09 RX ADMIN — FENTANYL CITRATE 25 MCG: 50 INJECTION, SOLUTION INTRAMUSCULAR; INTRAVENOUS at 14:39

## 2020-11-09 RX ADMIN — MORPHINE SULFATE 1 MG: 2 INJECTION, SOLUTION INTRAMUSCULAR; INTRAVENOUS at 21:22

## 2020-11-09 NOTE — ED PROVIDER NOTES
HPI     Pt is a 80 y.o. F with PMh of dementia, DJD here via EMS after a fall. She was being assisted when she fell. No LOC. believed she is having hip pain. However, pt screams at baseline and has screaming with any touching so unknown where all she is having pain at this time. Hx limited 2/2 to dementia. No other complaints at this time.       Past Medical History:   Diagnosis Date    DJD (degenerative joint disease)     Hematuria, microscopic     Hypertension     Memory loss     Menopause     Osteopenia        Past Surgical History:   Procedure Laterality Date    HX HYSTERECTOMY           Family History:   Problem Relation Age of Onset    Heart Disease Mother         congestive heart failure    Stroke Father     Alcohol abuse Son     Alcohol abuse Daughter        Social History     Socioeconomic History    Marital status:      Spouse name: Not on file    Number of children: Not on file    Years of education: Not on file    Highest education level: Not on file   Occupational History    Not on file   Social Needs    Financial resource strain: Not on file    Food insecurity     Worry: Not on file     Inability: Not on file    Transportation needs     Medical: Not on file     Non-medical: Not on file   Tobacco Use    Smoking status: Never Smoker    Smokeless tobacco: Never Used   Substance and Sexual Activity    Alcohol use: No     Alcohol/week: 0.0 standard drinks    Drug use: No    Sexual activity: Never   Lifestyle    Physical activity     Days per week: Not on file     Minutes per session: Not on file    Stress: Not on file   Relationships    Social connections     Talks on phone: Not on file     Gets together: Not on file     Attends Jew service: Not on file     Active member of club or organization: Not on file     Attends meetings of clubs or organizations: Not on file     Relationship status: Not on file    Intimate partner violence     Fear of current or ex partner: Not on file     Emotionally abused: Not on file     Physically abused: Not on file     Forced sexual activity: Not on file   Other Topics Concern    Not on file   Social History Narrative    Not on file         ALLERGIES: Cephalexin; Macrobid [nitrofurantoin monohyd/m-cryst]; Razadyne [galantamine]; and Sulfa (sulfonamide antibiotics)    Review of Systems   Unable to perform ROS: Dementia   Musculoskeletal: Positive for arthralgias. Visit Vitals  BP (!) 144/80   Pulse 80   Temp 98.7 °F (37.1 °C)   Resp 16   SpO2 100%         Physical Exam  Vitals signs and nursing note reviewed. Constitutional:       General: She is not in acute distress. Appearance: She is well-developed. She is not diaphoretic. HENT:      Head: Normocephalic and atraumatic. Eyes:      Pupils: Pupils are equal, round, and reactive to light. Neck:      Musculoskeletal: Normal range of motion. Cardiovascular:      Rate and Rhythm: Normal rate. Pulmonary:      Effort: Pulmonary effort is normal.   Abdominal:      General: There is no distension. Musculoskeletal:         General: Tenderness and signs of injury present. Right hip: She exhibits decreased range of motion, tenderness and bony tenderness. Skin:     Findings: Rash (forehead) present. Neurological:      Mental Status: She is alert. Mental status is at baseline. She is disoriented. MDM       Procedures      CONSULT NOTE:  1:12 PM Anabelle Dean spoke with MOHINDER Ca and Dr. Sudeep Sanches, Consult for Orthopedics. Discussed available diagnostic tests and clinical findings. They will see the patient. Perfect Serve Consult for Admission  1:12 PM    ED Room Number: OR/PL  Patient Name and age:  Lyssa Last 80 y.o.  female  Working Diagnosis:   1.  Closed fracture of neck of right femur, initial encounter ([facility] [address]        COVID-19 Suspicion:  Other from NH ([facility])  Sepsis present:  no  Reassessment needed: no  Code Status:  Do Not Resuscitate  Readmission: no  Isolation Requirements:  yes  Recommended Level of Care:  med/surg  Department:[facility] Adult ED - [phone]  Other:      Spoke with pt's daughter about the above. Pt accepted by Dr. Leonard Cid.     Gomez Goff MD

## 2020-11-09 NOTE — PROGRESS NOTES
TRANSFER - IN REPORT:    Verbal report received from COLLIN Goddard(name) on Chandni Pale  being received from ED(unit) for routine progression of care      Report consisted of patients Situation, Background, Assessment and   Recommendations(SBAR). Information from the following report(s) SBAR, Kardex, Intake/Output, MAR, Recent Results and Med Rec Status was reviewed with the receiving nurse. Opportunity for questions and clarification was provided. Assessment completed upon patients arrival to unit and care assumed.

## 2020-11-09 NOTE — H&P
History & Physical    Primary Care Provider: Chencho Alvarado MD  Source of Information: Chart review    History of Presenting Illness:   Heike Mcmahon is a 80 y.o. female who presents with fall    History is primary obtained from the patient    Patient presented to the ER from our [facility] with fall. Patient Antonio Cheung was being assisted when she had a fall. Patient came to the ER with apparent hip pain, was found to have a femoral fracture and was requested to be admitted to the hospitalist service. Patient has dementia, peers to be in significant pain, but no history could be obtained from the patient. I did speak to the patient's son Magan Bahena who reports that patient has dementia, is not able to hold a conversation, and is a DNR. No further history can be obtained from the patient or from the family         Review of Systems:  Review of systems not obtained due to patient factors. Dementia    Past Medical History:   Diagnosis Date    DJD (degenerative joint disease)     Hematuria, microscopic     Hypertension     Memory loss     Menopause     Osteopenia       Past Surgical History:   Procedure Laterality Date    HX HYSTERECTOMY       Prior to Admission medications    Medication Sig Start Date End Date Taking? Authorizing Provider   risperiDONE (RisperDAL) 0.25 mg tablet Take 0.25 mg by mouth Daily (before lunch). Yes Provider, Historical   loratadine (CLARITIN) 10 mg tablet Take 1 Tab by mouth daily. For allergies. 8/27/20  Yes Eloy Bruce MD   busPIRone (BUSPAR) 10 mg tablet Take 1 Tab by mouth three (3) times daily. 2/11/20  Yes Eloy Bruce MD   amLODIPine (NORVASC) 2.5 mg tablet Take 1 Tab by mouth daily.  1/20/20  Yes Eloy Bruce MD   potassium chloride SR (KLOR-CON 8) 8 mEq tablet TAKE 1 TABLET BY MOUTH EVERY DAY 7/13/19  Yes Eloy Bruce MD   hydroCHLOROthiazide (MICROZIDE) 12.5 mg capsule TAKE ONE CAPSULE BY MOUTH EVERY MORNING 10/2/18  Yes Mari Bruce MD   QUEtiapine (SEROQUEL) 25 mg tablet Take 0.5 Tabs by mouth nightly. 5/11/18  Yes Mari Bruce MD   multivitamin (ONE A DAY) tablet Take 1 Tab by mouth daily. Yes Provider, Historical   memantine (NAMENDA) 10 mg tablet TAKE 1 TABLET BY MOUTH TWICE A DAY 5/4/16  Yes Mari Bruce MD   acetaminophen (TYLENOL) 325 mg tablet Take  by mouth every four (4) hours as needed for Pain.     Provider, Historical     Allergies   Allergen Reactions    Cephalexin Nausea Only    Macrobid [Nitrofurantoin Monohyd/M-Cryst] Myalgia    Razadyne [Galantamine] Nausea Only    Sulfa (Sulfonamide Antibiotics) Rash      Family History   Problem Relation Age of Onset    Heart Disease Mother         congestive heart failure    Stroke Father     Alcohol abuse Son     Alcohol abuse Daughter         SOCIAL HISTORY:  Patient resides:  Independently X   Assisted Living    SNF    With family care       Smoking history:   None X   Former    Chronic      Alcohol history:   None X   Social    Chronic      Ambulates:   Independently    w/cane    w/walker X   w/wc    CODE STATUS:  DNR X   Full    Other      Objective:     Physical Exam:     Visit Vitals  /61   Pulse 79   Temp 97.8 °F (36.6 °C)   Resp 24   SpO2 99%         General : alert x 0, awake,  patient with dementia  HEENT: PEERL, dry mucous membrane, tympanic membrane is clear  Neck: supple, no JVD, no meningeal signs  Chest: Clear to auscultation bilaterally   CVS: S1 S2 heard, Capillary refill less than 2 seconds  Abd: soft/ Non tender, non distended, BS physiological,   Ext: no clubbing, no cyanosis, no edema, right leg slightly externally rotated   neuro/Psych: Very limited exam, DTR 1+ to 3  Skin: warm    EKG:  Pending      Data Review:     Recent Days:  Recent Labs     11/09/20  1100   WBC 9.0   HGB 15.6   HCT 47.9*        Recent Labs     11/09/20  1100      K 3.2*      CO2 25   GLU 121*   BUN 20   CREA 0.95   CA 9.5   ALB 3.8   ALT 18     No results for input(s): PH, PCO2, PO2, HCO3, FIO2 in the last 72 hours. 24 Hour Results:  Recent Results (from the past 24 hour(s))   CBC WITH AUTOMATED DIFF    Collection Time: 11/09/20 11:00 AM   Result Value Ref Range    WBC 9.0 3.6 - 11.0 K/uL    RBC 5.19 3.80 - 5.20 M/uL    HGB 15.6 11.5 - 16.0 g/dL    HCT 47.9 (H) 35.0 - 47.0 %    MCV 92.3 80.0 - 99.0 FL    MCH 30.1 26.0 - 34.0 PG    MCHC 32.6 30.0 - 36.5 g/dL    RDW 13.6 11.5 - 14.5 %    PLATELET 892 066 - 554 K/uL    MPV 9.8 8.9 - 12.9 FL    NRBC 0.0 0  WBC    ABSOLUTE NRBC 0.00 0.00 - 0.01 K/uL    NEUTROPHILS 70 32 - 75 %    LYMPHOCYTES 18 12 - 49 %    MONOCYTES 9 5 - 13 %    EOSINOPHILS 1 0 - 7 %    BASOPHILS 1 0 - 1 %    IMMATURE GRANULOCYTES 1 (H) 0.0 - 0.5 %    ABS. NEUTROPHILS 6.4 1.8 - 8.0 K/UL    ABS. LYMPHOCYTES 1.6 0.8 - 3.5 K/UL    ABS. MONOCYTES 0.8 0.0 - 1.0 K/UL    ABS. EOSINOPHILS 0.1 0.0 - 0.4 K/UL    ABS. BASOPHILS 0.1 0.0 - 0.1 K/UL    ABS. IMM. GRANS. 0.1 (H) 0.00 - 0.04 K/UL    DF AUTOMATED     METABOLIC PANEL, COMPREHENSIVE    Collection Time: 11/09/20 11:00 AM   Result Value Ref Range    Sodium 141 136 - 145 mmol/L    Potassium 3.2 (L) 3.5 - 5.1 mmol/L    Chloride 105 97 - 108 mmol/L    CO2 25 21 - 32 mmol/L    Anion gap 11 5 - 15 mmol/L    Glucose 121 (H) 65 - 100 mg/dL    BUN 20 6 - 20 MG/DL    Creatinine 0.95 0.55 - 1.02 MG/DL    BUN/Creatinine ratio 21 (H) 12 - 20      GFR est AA >60 >60 ml/min/1.73m2    GFR est non-AA 55 (L) >60 ml/min/1.73m2    Calcium 9.5 8.5 - 10.1 MG/DL    Bilirubin, total 0.8 0.2 - 1.0 MG/DL    ALT (SGPT) 18 12 - 78 U/L    AST (SGOT) 18 15 - 37 U/L    Alk.  phosphatase 74 45 - 117 U/L    Protein, total 7.3 6.4 - 8.2 g/dL    Albumin 3.8 3.5 - 5.0 g/dL    Globulin 3.5 2.0 - 4.0 g/dL    A-G Ratio 1.1 1.1 - 2.2     SAMPLES BEING HELD    Collection Time: 11/09/20 11:00 AM   Result Value Ref Range    SAMPLES BEING HELD 1RED,BLUE     COMMENT Add-on orders for these samples will be processed based on acceptable specimen integrity and analyte stability, which may vary by analyte. Imaging:   Xr Hip Rt W Or Wo Pelv 2-3 Vws    Result Date: 11/9/2020  IMPRESSION: Right femoral neck fracture with impaction.     Assessment:     Right femoral fracture: Appears to be traumatic, patient status post fall but no loss of consciousness per records, admit patient on a Ortho bed, IV hydration, n.p.o., Ortho consult, pain control, patient appears to be high risk for surgery, EKG pending, will place Leone, provide pain control, further intervention per hospital course, await Ortho input, bedrest and continue to monitor    Hypokalemia: Replace potassium, continue to monitor    Dehydration: Patient appears to be dehydrated, gentle IV hydration, repeat labs in the morning, continue monitor    Dementia: Patient will be on fall precautions and monitored,    GI DVT prophylaxis: Patient will be on SCDs                         Signed By: Manolo Zacarias MD     November 9, 2020

## 2020-11-09 NOTE — PROGRESS NOTES
Problem: General Medical Care Plan  Goal: *Vital signs within specified parameters  Outcome: Progressing Towards Goal  Goal: *Labs within defined limits  Outcome: Progressing Towards Goal     Problem: Falls - Risk of  Goal: *Absence of Falls  Description: Document Sasha Fall Risk and appropriate interventions in the flowsheet.   Outcome: Progressing Towards Goal  Note: Fall Risk Interventions:                                Problem: Patient Education: Go to Patient Education Activity  Goal: Patient/Family Education  Outcome: Progressing Towards Goal

## 2020-11-09 NOTE — TELEPHONE ENCOUNTER
Spoke with pt's son Carie Nunez. Advised him MD wanted to know who is pt's POA? He states he is. MD advised it would be best if they all had a conference call with all of the kids. How would 8 30 AM be? He said that would have been great but the decision has been made. Pt at [facility] - Dr Dar Dozier is doing surgery - partial right hip replacement. He states family seems to be ok with this. Thanks MD for offer.  Will forward back to MD.

## 2020-11-09 NOTE — TELEPHONE ENCOUNTER
Carmen/Son,Mainor (EC) [phone] (H)     pt's son calling about his mother breaking her hip. He wants to talk to dr Nayely Munguia about if should have surgery or not.

## 2020-11-09 NOTE — ED TRIAGE NOTES
Pt arrives with EMS from [address] [place] [address] [facility] [place], [place]. Per EMS, pt was attempting to ambulate with staff and had GLF. Denies hitting head. RIGHT hip pain and rotation noted by staff. Pt screaming on any touch.  Oriented to self only

## 2020-11-09 NOTE — CONSULTS
ORTHO CONSULT NOTE    Date of Consultation:  2020  Referring Physician:  Lynn Hearing: right hip pain    History obtained from patient, daughter at bedside, and [facility] [phone]) via tele. HPI:  Dimple Ugalde is a 80 y.o. female PMH dementia who c/o significant right hip pain s/p GLF. She states she tripped and landed on right side resulting in significant right hip pain. She was unable to bear weight after the fall. She denies foot numbness and open wound. She resides in Memory Unit at [address] [place] [address] and ambulates within the facility without an assistive device. She walked outdoors until about one year ago. She denies Orthopedic relationship. Denies anticoagulants at baseline. Past Medical History:   Diagnosis Date    DJD (degenerative joint disease)     Hematuria, microscopic     Hypertension     Memory loss     Menopause     Osteopenia       Past Surgical History:   Procedure Laterality Date    HX HYSTERECTOMY        Family History   Problem Relation Age of Onset    Heart Disease Mother         congestive heart failure    Stroke Father     Alcohol abuse Son     Alcohol abuse Daughter       Social History     Tobacco Use    Smoking status: Never Smoker    Smokeless tobacco: Never Used   Substance Use Topics    Alcohol use: No     Alcohol/week: 0.0 standard drinks     Allergies   Allergen Reactions    Cephalexin Nausea Only    Macrobid [Nitrofurantoin Monohyd/M-Cryst] Myalgia    Razadyne [Galantamine] Nausea Only    Sulfa (Sulfonamide Antibiotics) Rash        Review of Systems:  Per HPI.     Objective:     Patient Vitals for the past 8 hrs:   BP Temp Pulse Resp SpO2   20 1230 131/61    99 %   20 1200 (!) 139/57    100 %   20 1130 (!) 140/85    99 %   20 1100 (!) 145/77    99 %   20 1055 (!) 167/83 97.8 °F (36.6 °C) 79 24 99 %     Temp (24hrs), Av.8 °F (36.6 °C), Min:97.8 °F (36.6 °C), Max:97.8 °F (36.6 Previously on Humira, currently on hold secondary to lack of insurance as well as discontinued after pneumonia in the fall of 2017  Continue to hold Humira, okay to continue mesalamine at this point  Patient did not have any GI complaints during brief hospitalization  °C)      EXAM:   Mild acute distress. Lying in ER stretcher. Daughter present. Hard of hearing. Wearing reg clothes incl slacks. Moves BUE OK with NTTP. LLE NTTP. No pain with logroll. Moves ankle/foot OK. Foot SILT with palp DP. RLE shortened and externally rotated. Moves ankle/foot OK. Foot SILT with palp DP. Imaging Review:   Results from Hospital Encounter encounter on 11/09/20   XR HIP RT W OR WO PELV 2-3 VWS    Narrative EXAM: XR HIP RT W OR WO PELV 2-3 VWS    INDICATION: right hip pain, GLF,.    COMPARISON: None. FINDINGS: AP view of the pelvis and a frogleg lateral view of the right hip  demonstrate a right femoral neck fracture with impaction. Impression IMPRESSION:   Right femoral neck fracture with impaction. Labs:   WBC 9.0, Hgb 15.6, . BUN 20, Cr 0.95, eGFR 55. Impression:     Patient Active Problem List    Diagnosis Date Noted    Femur fracture ([address] 11/09/2020    Moderate dementia with behavioral disturbance ([address] 02/20/2018    Primary insomnia 02/20/2018    Advance directive discussed with patient 03/13/2017    Other abnormal glucose 04/06/2012    Encounter for long-term (current) use of other medications 04/06/2012    Unspecified disorder of kidney and ureter 02/21/2011    Essential hypertension, benign 11/23/2009    Nausea alone 11/23/2009    Memory loss 11/23/2009    Symptomatic menopausal or female climacteric states 11/23/2009    Arthropathy, unspecified, site unspecified 11/23/2009    Disorder of bone and cartilage, unspecified 11/23/2009     Active Problems:    Femur fracture ([address] (11/9/2020)    Right femoral neck hip fracture. Plan:   I explained the nature of the injury and discussed the recommended surgery with patient, daughter at bedside, and son via speakerphone. Discussed potential risks/benefits/alternatives of surgery and blood transfusions. Patient, daughter, and son consent to both. Plan for right hip lisa-arthroplasty.     I met son, Gustavo, outside ER to sign consent (ER policy limiting bedside family members). Consent signed and placed on chart. Will proceed with surgery once medically optimized. Tentatively posted for 11/10/20 am.  Bedrest.  NPO p MN. Ice. SCDs OK. Hold heparin. Analgesics. EKG pending. Dr. Li Preston is aware and agrees with above plan.       MOHINDER Marc  Orthopedic Trauma Service  [facility]

## 2020-11-09 NOTE — PROGRESS NOTES
Admission Medication Reconciliation:    Information obtained from:  Medication list from [facility]  RxQuery data available¹:  NO    Comments/Recommendations: Updated PTA meds/reviewed patient's allergies. 1)  Medication list updated upon review of list from facility    2)  Medication changes (since last review): Added  - Risperidone     Adjusted  - None    Removed  - None     ¹RxQuery pharmacy benefit data reflects medications filled and processed through the patient's insurance, however   this data does NOT capture whether the medication was picked up or is currently being taken by the patient. Allergies:  Cephalexin; Macrobid [nitrofurantoin monohyd/m-cryst]; Razadyne [galantamine]; and Sulfa (sulfonamide antibiotics)    Significant PMH/Disease States:   Past Medical History:   Diagnosis Date    DJD (degenerative joint disease)     Hematuria, microscopic     Hypertension     Memory loss     Menopause     Osteopenia      Chief Complaint for this Admission:    Chief Complaint   Patient presents with    Hip Pain     right    Fall     Prior to Admission Medications:   Prior to Admission Medications   Prescriptions Last Dose Informant Taking? QUEtiapine (SEROQUEL) 25 mg tablet   Yes   Sig: Take 0.5 Tabs by mouth nightly. acetaminophen (TYLENOL) 325 mg tablet   No   Sig: Take  by mouth every four (4) hours as needed for Pain. amLODIPine (NORVASC) 2.5 mg tablet   Yes   Sig: Take 1 Tab by mouth daily. busPIRone (BUSPAR) 10 mg tablet   Yes   Sig: Take 1 Tab by mouth three (3) times daily. hydroCHLOROthiazide (MICROZIDE) 12.5 mg capsule   Yes   Sig: TAKE ONE CAPSULE BY MOUTH EVERY MORNING   loratadine (CLARITIN) 10 mg tablet   Yes   Sig: Take 1 Tab by mouth daily. For allergies. memantine (NAMENDA) 10 mg tablet   Yes   Sig: TAKE 1 TABLET BY MOUTH TWICE A DAY   multivitamin (ONE A DAY) tablet   Yes   Sig: Take 1 Tab by mouth daily.    potassium chloride SR (KLOR-CON 8) 8 mEq tablet   Yes   Sig: TAKE 1 TABLET BY MOUTH EVERY DAY   risperiDONE (RisperDAL) 0.25 mg tablet   Yes   Sig: Take 0.25 mg by mouth Daily (before lunch). Facility-Administered Medications: None       Please contact the main inpatient pharmacy with any questions or concerns at [phone] and we will direct you to the clinical pharmacist covering this patient's care while in-house.    Howard Lozoya, PHARMD

## 2020-11-09 NOTE — ROUTINE PROCESS
TRANSFER - OUT REPORT: 
 
Verbal report given to chikis(name) on Amy Bobby  being transferred to (unit) for routine progression of care Report consisted of patients Situation, Background, Assessment and  
Recommendations(SBAR). Information from the following report(s) SBAR, ED Summary, [facility] and Recent Results was reviewed with the receiving nurse. Lines:  
Peripheral IV 11/09/20 Right Antecubital (Active) Site Assessment Clean, dry, & intact 11/09/20 1056 Phlebitis Assessment 0 11/09/20 1056 Infiltration Assessment 0 11/09/20 1056 Dressing Status Clean, dry, & intact 11/09/20 1056 Dressing Type Transparent 11/09/20 1056 Hub Color/Line Status Pink;Patent; Flushed 11/09/20 1056 Opportunity for questions and clarification was provided. Patient transported with: 
 [employer]

## 2020-11-09 NOTE — TELEPHONE ENCOUNTER
brayden from [facility] [phone]     Wants dr Autumn Helm to know pt fell this morning and was sent to [facility] er with possible fractured hip.

## 2020-11-09 NOTE — PROGRESS NOTES
Reason for Admission:   Right Femoral fracture                   RUR Score:    N/A  RRAT = 11                 Plan for utilizing home health:   Patient currently is a resident at [address] [place] [address]. Noted INPT CM consult  Requesting evaluation SNF versus rehab TBD. PCP: First and Last name:  Fletcher Marin   Name of Practice: [facility]   Are you a current patient: Yes/No: Yes    Approximate date of last visit:1/20/20    Can you participate in a virtual visit with your PCP: No                    Current Advanced Directive/Advance Care Plan: Living Will/ DNR on file w/ [facility]. Transition of Care Plan:   Patient admitted. Pending Ortho consult . EMR reviewed. History significant for  Dementia, blurred vision and essential hypertension. Met w/daughter Tama Schirmer while patient slept. Introduced to role of CM.  has resided in memory care for 1.5 years (previously in her home). Support received from both Valentines and son Alisa [facility] [phone]. Son-n-law is Saundra Sam (v) [phone] - daughter only uses house phone. No transitional care needs verbalized at this time. Care Management Interventions  PCP Verified by CM: Yes  Last Visit to PCP: 01/20/20  Palliative Care Criteria Met (RRAT>21 & CHF Dx)?: No  Transition of Care Consult (CM Consult):  Other, Discharge Planning(TRST)  MyChart Signup: No  Discharge Durable Medical Equipment: No  Health Maintenance Reviewed: Yes  Physical Therapy Consult: No  Occupational Therapy Consult: No  Speech Therapy Consult: No  Current Support Network: [address] Provided?: No  Discharge Location  Discharge Placement: (TBD)

## 2020-11-09 NOTE — ACP (ADVANCE CARE PLANNING)
Advance Care Planning     Advance Care Planning Activator (Inpatient)  Conversation Note      Date of ACP Conversation: 11/09/20     Conversation Conducted with:   Reji Morales/ Son [phone]    ACP Activator: Louie Troy makes decisions on behalf of the incapacitated patient: Decision Maker is asked to consider and make decisions based on patient values, known preferences, or best interests. Health Care Decision Maker:    Current Designated Health Care Decision Maker:  Claudine Schmidt son [phone]  and daughter Renan Banda [phone]  (If there is a [address] named in the [facility] Makers\" box in the ACP activity, but it is not visible above, be sure to open that field and then select the health care decision maker relationship (ie \"primary\") in the blank space to the right of the name.)   Care Preferences    Ventilation: \"If you were in your present state of health and suddenly became very ill and were unable to breathe on your own, what would your preference be about the use of a ventilator (breathing machine) if it were available to you? \"      If patient would desire the use of a ventilator (breathing machine), answer \"yes\", if not \"no\":No    \"If your health worsens and it becomes clear that your chance of recovery is unlikely, what would your preference be about the use of a ventilator (breathing machine) if it were available to you? \"     Would the patient desire the use of a ventilator (breathing machine)? No      Resuscitation  \"CPR works best to restart the heart when there is a sudden event, like a heart attack, in someone who is otherwise healthy. Unfortunately, CPR does not typically restart the heart for people who have serious health conditions or who are very sick. \"    \"In the event your heart stopped as a result of an underlying serious health condition, would you want attempts to be made to restart your heart (answer \"yes\" for attempt to resuscitate) or would you prefer a natural death (answer \"no\" for do not attempt to resuscitate)? \" No      NOTE: If the patient has a valid advance directive AND now provides care preference(s) that are inconsistent with that prior directive, advise the patient to consider either: creating a new advance directive that complies with state-specific requirements; or, if that is not possible, orally revoking that prior directive in accordance with state-specific requirements, which must be documented in the EHR. [x] Yes  [] No   Educated Patient / Lino Walker regarding differences between Advance Directives and portable DNR orders.     Length of ACP Conversation in minutes:  5 minutes    Conversation Outcomes:  [x] ACP discussion completed  [] Existing advance directive reviewed with patient; no changes to patient's previously recorded wishes     [] New Advance Directive completed   [] Portable Do Not Resuscitate prepared for Provider review and signature  [] POLST/POST/MOLST/MOST prepared for Provider review and signature      Follow-up plan:    [x] Schedule follow-up conversation to continue planning  [] Referred individual to Provider for additional questions/concerns   [] Advised patient/agent/surrogate to review completed ACP document and update if needed with changes in condition, patient preferences or care setting     [] This note routed to one or more involved healthcare providers

## 2020-11-10 ENCOUNTER — ANESTHESIA (OUTPATIENT)
Dept: SURGERY | Age: 85
DRG: 522 | End: 2020-11-10
Payer: MEDICARE

## 2020-11-10 ENCOUNTER — APPOINTMENT (OUTPATIENT)
Dept: GENERAL RADIOLOGY | Age: 85
DRG: 522 | End: 2020-11-10
Attending: PHYSICIAN ASSISTANT
Payer: MEDICARE

## 2020-11-10 ENCOUNTER — APPOINTMENT (OUTPATIENT)
Dept: GENERAL RADIOLOGY | Age: 85
DRG: 522 | End: 2020-11-10
Attending: ORTHOPAEDIC SURGERY
Payer: MEDICARE

## 2020-11-10 ENCOUNTER — TELEPHONE (OUTPATIENT)
Dept: INTERNAL MEDICINE CLINIC | Age: 85
End: 2020-11-10

## 2020-11-10 ENCOUNTER — ANESTHESIA EVENT (OUTPATIENT)
Dept: SURGERY | Age: 85
DRG: 522 | End: 2020-11-10
Payer: MEDICARE

## 2020-11-10 PROBLEM — S72.009A FEMORAL NECK FRACTURE (HCC): Status: ACTIVE | Noted: 2020-11-10

## 2020-11-10 LAB
ALBUMIN SERPL-MCNC: 3.4 G/DL (ref 3.5–5)
ALBUMIN/GLOB SERPL: 1.1 {RATIO} (ref 1.1–2.2)
ALP SERPL-CCNC: 69 U/L (ref 45–117)
ALT SERPL-CCNC: 18 U/L (ref 12–78)
ANION GAP SERPL CALC-SCNC: 7 MMOL/L (ref 5–15)
APPEARANCE UR: CLEAR
AST SERPL-CCNC: 21 U/L (ref 15–37)
ATRIAL RATE: 102 BPM
BACTERIA URNS QL MICRO: NEGATIVE /HPF
BASOPHILS # BLD: 0 K/UL (ref 0–0.1)
BASOPHILS NFR BLD: 0 % (ref 0–1)
BILIRUB SERPL-MCNC: 0.8 MG/DL (ref 0.2–1)
BILIRUB UR QL: NEGATIVE
BUN SERPL-MCNC: 17 MG/DL (ref 6–20)
BUN/CREAT SERPL: 26 (ref 12–20)
CALCIUM SERPL-MCNC: 8.6 MG/DL (ref 8.5–10.1)
CALCULATED P AXIS, ECG09: 61 DEGREES
CALCULATED R AXIS, ECG10: 118 DEGREES
CALCULATED T AXIS, ECG11: 35 DEGREES
CHLORIDE SERPL-SCNC: 107 MMOL/L (ref 97–108)
CO2 SERPL-SCNC: 24 MMOL/L (ref 21–32)
COLOR UR: ABNORMAL
CREAT SERPL-MCNC: 0.65 MG/DL (ref 0.55–1.02)
DIAGNOSIS, 93000: NORMAL
DIFFERENTIAL METHOD BLD: ABNORMAL
EOSINOPHIL # BLD: 0.1 K/UL (ref 0–0.4)
EOSINOPHIL NFR BLD: 1 % (ref 0–7)
EPITH CASTS URNS QL MICRO: ABNORMAL /LPF
ERYTHROCYTE [DISTWIDTH] IN BLOOD BY AUTOMATED COUNT: 13.6 % (ref 11.5–14.5)
GLOBULIN SER CALC-MCNC: 3.2 G/DL (ref 2–4)
GLUCOSE SERPL-MCNC: 113 MG/DL (ref 65–100)
GLUCOSE UR STRIP.AUTO-MCNC: NEGATIVE MG/DL
HCT VFR BLD AUTO: 43.7 % (ref 35–47)
HGB BLD-MCNC: 14.7 G/DL (ref 11.5–16)
HGB UR QL STRIP: ABNORMAL
IMM GRANULOCYTES # BLD AUTO: 0 K/UL (ref 0–0.04)
IMM GRANULOCYTES NFR BLD AUTO: 0 % (ref 0–0.5)
INR PPP: 1 (ref 0.9–1.1)
KETONES UR QL STRIP.AUTO: 15 MG/DL
LEUKOCYTE ESTERASE UR QL STRIP.AUTO: ABNORMAL
LYMPHOCYTES # BLD: 1 K/UL (ref 0.8–3.5)
LYMPHOCYTES NFR BLD: 9 % (ref 12–49)
MCH RBC QN AUTO: 30.5 PG (ref 26–34)
MCHC RBC AUTO-ENTMCNC: 33.6 G/DL (ref 30–36.5)
MCV RBC AUTO: 90.7 FL (ref 80–99)
MONOCYTES # BLD: 1 K/UL (ref 0–1)
MONOCYTES NFR BLD: 10 % (ref 5–13)
NEUTS SEG # BLD: 8.2 K/UL (ref 1.8–8)
NEUTS SEG NFR BLD: 80 % (ref 32–75)
NITRITE UR QL STRIP.AUTO: NEGATIVE
NRBC # BLD: 0 K/UL (ref 0–0.01)
NRBC BLD-RTO: 0 PER 100 WBC
P-R INTERVAL, ECG05: 156 MS
PH UR STRIP: 7 [PH] (ref 5–8)
PLATELET # BLD AUTO: 206 K/UL (ref 150–400)
PMV BLD AUTO: 9.8 FL (ref 8.9–12.9)
POTASSIUM SERPL-SCNC: 3.2 MMOL/L (ref 3.5–5.1)
PROT SERPL-MCNC: 6.6 G/DL (ref 6.4–8.2)
PROT UR STRIP-MCNC: 30 MG/DL
PROTHROMBIN TIME: 10.9 SEC (ref 9–11.1)
Q-T INTERVAL, ECG07: 386 MS
QRS DURATION, ECG06: 132 MS
QTC CALCULATION (BEZET), ECG08: 503 MS
RBC # BLD AUTO: 4.82 M/UL (ref 3.8–5.2)
RBC #/AREA URNS HPF: ABNORMAL /HPF (ref 0–5)
SODIUM SERPL-SCNC: 138 MMOL/L (ref 136–145)
SP GR UR REFRACTOMETRY: 1.02 (ref 1–1.03)
UA: UC IF INDICATED,UAUC: ABNORMAL
UR CULT HOLD, URHOLD: NORMAL
UROBILINOGEN UR QL STRIP.AUTO: 1 EU/DL (ref 0.2–1)
VENTRICULAR RATE, ECG03: 102 BPM
WBC # BLD AUTO: 10.3 K/UL (ref 3.6–11)
WBC URNS QL MICRO: ABNORMAL /HPF (ref 0–4)

## 2020-11-10 PROCEDURE — 73501 X-RAY EXAM HIP UNI 1 VIEW: CPT

## 2020-11-10 PROCEDURE — 77030041397 HC DRSG PRIMASEAL AG MDII -B: Performed by: ORTHOPAEDIC SURGERY

## 2020-11-10 PROCEDURE — 2709999900 HC NON-CHARGEABLE SUPPLY: Performed by: ORTHOPAEDIC SURGERY

## 2020-11-10 PROCEDURE — 74011250636 HC RX REV CODE- 250/636: Performed by: NURSE PRACTITIONER

## 2020-11-10 PROCEDURE — C9290 INJ, BUPIVACAINE LIPOSOME: HCPCS | Performed by: ORTHOPAEDIC SURGERY

## 2020-11-10 PROCEDURE — 85025 COMPLETE CBC W/AUTO DIFF WBC: CPT

## 2020-11-10 PROCEDURE — 74011250636 HC RX REV CODE- 250/636: Performed by: ORTHOPAEDIC SURGERY

## 2020-11-10 PROCEDURE — 80053 COMPREHEN METABOLIC PANEL: CPT

## 2020-11-10 PROCEDURE — 77030031139 HC SUT VCRL2 J&J -A: Performed by: ORTHOPAEDIC SURGERY

## 2020-11-10 PROCEDURE — 77030035236 HC SUT PDS STRATFX BARB J&J -B: Performed by: ORTHOPAEDIC SURGERY

## 2020-11-10 PROCEDURE — 76060000034 HC ANESTHESIA 1.5 TO 2 HR: Performed by: ORTHOPAEDIC SURGERY

## 2020-11-10 PROCEDURE — 74011000258 HC RX REV CODE- 258: Performed by: ORTHOPAEDIC SURGERY

## 2020-11-10 PROCEDURE — 77030033067 HC SUT PDO STRATFX SPIR J&J -B: Performed by: ORTHOPAEDIC SURGERY

## 2020-11-10 PROCEDURE — 77030008684 HC TU ET CUF COVD -B: Performed by: ANESTHESIOLOGY

## 2020-11-10 PROCEDURE — 77030026438 HC STYL ET INTUB CARD -A: Performed by: ANESTHESIOLOGY

## 2020-11-10 PROCEDURE — C1751 CATH, INF, PER/CENT/MIDLINE: HCPCS

## 2020-11-10 PROCEDURE — 76210000001 HC OR PH I REC 2.5 TO 3 HR: Performed by: ORTHOPAEDIC SURGERY

## 2020-11-10 PROCEDURE — 74011250636 HC RX REV CODE- 250/636: Performed by: PHYSICIAN ASSISTANT

## 2020-11-10 PROCEDURE — 74011250636 HC RX REV CODE- 250/636: Performed by: ANESTHESIOLOGY

## 2020-11-10 PROCEDURE — 74011250637 HC RX REV CODE- 250/637: Performed by: PHYSICIAN ASSISTANT

## 2020-11-10 PROCEDURE — 2709999900 HC NON-CHARGEABLE SUPPLY

## 2020-11-10 PROCEDURE — 74011000250 HC RX REV CODE- 250: Performed by: ORTHOPAEDIC SURGERY

## 2020-11-10 PROCEDURE — 74011000250 HC RX REV CODE- 250: Performed by: NURSE ANESTHETIST, CERTIFIED REGISTERED

## 2020-11-10 PROCEDURE — 77030011264 HC ELECTRD BLD EXT COVD -A: Performed by: ORTHOPAEDIC SURGERY

## 2020-11-10 PROCEDURE — 77030010507 HC ADH SKN DERMBND J&J -B: Performed by: ORTHOPAEDIC SURGERY

## 2020-11-10 PROCEDURE — 74011250636 HC RX REV CODE- 250/636: Performed by: NURSE ANESTHETIST, CERTIFIED REGISTERED

## 2020-11-10 PROCEDURE — 77030018836 HC SOL IRR NACL ICUM -A: Performed by: ORTHOPAEDIC SURGERY

## 2020-11-10 PROCEDURE — 77030018547 HC SUT ETHBND1 J&J -B: Performed by: ORTHOPAEDIC SURGERY

## 2020-11-10 PROCEDURE — 77030027138 HC INCENT SPIROMETER -A

## 2020-11-10 PROCEDURE — 77030006812 HC BLD SAW RECIP STRY -B: Performed by: ORTHOPAEDIC SURGERY

## 2020-11-10 PROCEDURE — 76010000153 HC OR TIME 1.5 TO 2 HR: Performed by: ORTHOPAEDIC SURGERY

## 2020-11-10 PROCEDURE — 77030008462 HC STPLR SKN PROX J&J -A: Performed by: ORTHOPAEDIC SURGERY

## 2020-11-10 PROCEDURE — 77030002933 HC SUT MCRYL J&J -A: Performed by: ORTHOPAEDIC SURGERY

## 2020-11-10 PROCEDURE — 0SRR0JA REPLACEMENT OF RIGHT HIP JOINT, FEMORAL SURFACE WITH SYNTHETIC SUBSTITUTE, UNCEMENTED, OPEN APPROACH: ICD-10-PCS | Performed by: ORTHOPAEDIC SURGERY

## 2020-11-10 PROCEDURE — 65270000029 HC RM PRIVATE

## 2020-11-10 PROCEDURE — 77030020788: Performed by: ORTHOPAEDIC SURGERY

## 2020-11-10 PROCEDURE — 77030005513 HC CATH URETH FOL11 MDII -B: Performed by: ORTHOPAEDIC SURGERY

## 2020-11-10 PROCEDURE — 77030036660

## 2020-11-10 PROCEDURE — 77030040922 HC BLNKT HYPOTHRM STRY -A

## 2020-11-10 PROCEDURE — C1776 JOINT DEVICE (IMPLANTABLE): HCPCS | Performed by: ORTHOPAEDIC SURGERY

## 2020-11-10 PROCEDURE — 77030018846 HC SOL IRR STRL H20 ICUM -A: Performed by: ORTHOPAEDIC SURGERY

## 2020-11-10 DEVICE — SELF CENTERING BI-POLAR HEAD 28MM ID 45MM OD
Type: IMPLANTABLE DEVICE | Site: HIP | Status: FUNCTIONAL
Brand: SELF CENTERING

## 2020-11-10 DEVICE — ARTICUL/EZE FEMORAL HEAD DIAMETER 28MM +1.5 12/14 TAPER
Type: IMPLANTABLE DEVICE | Site: HIP | Status: FUNCTIONAL
Brand: ARTICUL/EZE

## 2020-11-10 DEVICE — CORAIL HIP SYSTEM CEMENTLESS FEMORAL STEM 12/14 AMT 135 DEGREES KS SIZE 12 HA COATED STANDARD NO COLLAR
Type: IMPLANTABLE DEVICE | Site: HIP | Status: FUNCTIONAL
Brand: CORAIL

## 2020-11-10 RX ORDER — CEFAZOLIN SODIUM 1 G/3ML
INJECTION, POWDER, FOR SOLUTION INTRAMUSCULAR; INTRAVENOUS AS NEEDED
Status: DISCONTINUED | OUTPATIENT
Start: 2020-11-10 | End: 2020-11-10 | Stop reason: HOSPADM

## 2020-11-10 RX ORDER — PROPOFOL 10 MG/ML
INJECTION, EMULSION INTRAVENOUS AS NEEDED
Status: DISCONTINUED | OUTPATIENT
Start: 2020-11-10 | End: 2020-11-10 | Stop reason: HOSPADM

## 2020-11-10 RX ORDER — LIDOCAINE HYDROCHLORIDE 20 MG/ML
INJECTION, SOLUTION EPIDURAL; INFILTRATION; INTRACAUDAL; PERINEURAL AS NEEDED
Status: DISCONTINUED | OUTPATIENT
Start: 2020-11-10 | End: 2020-11-10 | Stop reason: HOSPADM

## 2020-11-10 RX ORDER — POLYETHYLENE GLYCOL 3350 17 G/17G
17 POWDER, FOR SOLUTION ORAL DAILY
Status: DISCONTINUED | OUTPATIENT
Start: 2020-11-11 | End: 2020-11-13 | Stop reason: HOSPADM

## 2020-11-10 RX ORDER — FACIAL-BODY WIPES
10 EACH TOPICAL DAILY PRN
Status: DISCONTINUED | OUTPATIENT
Start: 2020-11-12 | End: 2020-11-13 | Stop reason: HOSPADM

## 2020-11-10 RX ORDER — ONDANSETRON 2 MG/ML
INJECTION INTRAMUSCULAR; INTRAVENOUS AS NEEDED
Status: DISCONTINUED | OUTPATIENT
Start: 2020-11-10 | End: 2020-11-10 | Stop reason: HOSPADM

## 2020-11-10 RX ORDER — ACETAMINOPHEN 325 MG/1
650 TABLET ORAL ONCE
Status: DISCONTINUED | OUTPATIENT
Start: 2020-11-10 | End: 2020-11-10 | Stop reason: HOSPADM

## 2020-11-10 RX ORDER — SUCCINYLCHOLINE CHLORIDE 20 MG/ML
INJECTION INTRAMUSCULAR; INTRAVENOUS AS NEEDED
Status: DISCONTINUED | OUTPATIENT
Start: 2020-11-10 | End: 2020-11-10 | Stop reason: HOSPADM

## 2020-11-10 RX ORDER — SODIUM CHLORIDE, SODIUM LACTATE, POTASSIUM CHLORIDE, CALCIUM CHLORIDE 600; 310; 30; 20 MG/100ML; MG/100ML; MG/100ML; MG/100ML
INJECTION, SOLUTION INTRAVENOUS
Status: DISCONTINUED | OUTPATIENT
Start: 2020-11-10 | End: 2020-11-10 | Stop reason: HOSPADM

## 2020-11-10 RX ORDER — SODIUM CHLORIDE 0.9 % (FLUSH) 0.9 %
5-40 SYRINGE (ML) INJECTION AS NEEDED
Status: DISCONTINUED | OUTPATIENT
Start: 2020-11-10 | End: 2020-11-10 | Stop reason: HOSPADM

## 2020-11-10 RX ORDER — DEXAMETHASONE SODIUM PHOSPHATE 4 MG/ML
INJECTION, SOLUTION INTRA-ARTICULAR; INTRALESIONAL; INTRAMUSCULAR; INTRAVENOUS; SOFT TISSUE AS NEEDED
Status: DISCONTINUED | OUTPATIENT
Start: 2020-11-10 | End: 2020-11-10 | Stop reason: HOSPADM

## 2020-11-10 RX ORDER — ASPIRIN 81 MG/1
81 TABLET ORAL 2 TIMES DAILY
Status: DISCONTINUED | OUTPATIENT
Start: 2020-11-10 | End: 2020-11-13 | Stop reason: HOSPADM

## 2020-11-10 RX ORDER — ONDANSETRON 2 MG/ML
4 INJECTION INTRAMUSCULAR; INTRAVENOUS AS NEEDED
Status: DISCONTINUED | OUTPATIENT
Start: 2020-11-10 | End: 2020-11-10 | Stop reason: HOSPADM

## 2020-11-10 RX ORDER — SODIUM CHLORIDE 9 MG/ML
25 INJECTION, SOLUTION INTRAVENOUS CONTINUOUS
Status: DISCONTINUED | OUTPATIENT
Start: 2020-11-10 | End: 2020-11-10 | Stop reason: HOSPADM

## 2020-11-10 RX ORDER — KETOROLAC TROMETHAMINE 30 MG/ML
15 INJECTION, SOLUTION INTRAMUSCULAR; INTRAVENOUS EVERY 6 HOURS
Status: COMPLETED | OUTPATIENT
Start: 2020-11-10 | End: 2020-11-11

## 2020-11-10 RX ORDER — LIDOCAINE HYDROCHLORIDE 10 MG/ML
0.1 INJECTION, SOLUTION EPIDURAL; INFILTRATION; INTRACAUDAL; PERINEURAL AS NEEDED
Status: DISCONTINUED | OUTPATIENT
Start: 2020-11-10 | End: 2020-11-10 | Stop reason: HOSPADM

## 2020-11-10 RX ORDER — SODIUM CHLORIDE 9 MG/ML
75 INJECTION, SOLUTION INTRAVENOUS CONTINUOUS
Status: DISPENSED | OUTPATIENT
Start: 2020-11-10 | End: 2020-11-11

## 2020-11-10 RX ORDER — SODIUM CHLORIDE 0.9 % (FLUSH) 0.9 %
5-40 SYRINGE (ML) INJECTION EVERY 8 HOURS
Status: DISCONTINUED | OUTPATIENT
Start: 2020-11-10 | End: 2020-11-10 | Stop reason: HOSPADM

## 2020-11-10 RX ORDER — MORPHINE SULFATE 10 MG/ML
2 INJECTION, SOLUTION INTRAMUSCULAR; INTRAVENOUS
Status: DISCONTINUED | OUTPATIENT
Start: 2020-11-10 | End: 2020-11-10 | Stop reason: HOSPADM

## 2020-11-10 RX ORDER — FENTANYL CITRATE 50 UG/ML
INJECTION, SOLUTION INTRAMUSCULAR; INTRAVENOUS AS NEEDED
Status: DISCONTINUED | OUTPATIENT
Start: 2020-11-10 | End: 2020-11-10 | Stop reason: HOSPADM

## 2020-11-10 RX ORDER — MIDAZOLAM HYDROCHLORIDE 1 MG/ML
0.5 INJECTION, SOLUTION INTRAMUSCULAR; INTRAVENOUS
Status: DISCONTINUED | OUTPATIENT
Start: 2020-11-10 | End: 2020-11-10 | Stop reason: HOSPADM

## 2020-11-10 RX ORDER — HYDROMORPHONE HYDROCHLORIDE 1 MG/ML
0.2 INJECTION, SOLUTION INTRAMUSCULAR; INTRAVENOUS; SUBCUTANEOUS
Status: DISCONTINUED | OUTPATIENT
Start: 2020-11-10 | End: 2020-11-10 | Stop reason: HOSPADM

## 2020-11-10 RX ORDER — FENTANYL CITRATE 50 UG/ML
50 INJECTION, SOLUTION INTRAMUSCULAR; INTRAVENOUS AS NEEDED
Status: DISCONTINUED | OUTPATIENT
Start: 2020-11-10 | End: 2020-11-10 | Stop reason: HOSPADM

## 2020-11-10 RX ORDER — OXYCODONE HYDROCHLORIDE 5 MG/1
5 TABLET ORAL AS NEEDED
Status: DISCONTINUED | OUTPATIENT
Start: 2020-11-10 | End: 2020-11-10 | Stop reason: HOSPADM

## 2020-11-10 RX ORDER — FENTANYL CITRATE 50 UG/ML
25 INJECTION, SOLUTION INTRAMUSCULAR; INTRAVENOUS
Status: DISCONTINUED | OUTPATIENT
Start: 2020-11-10 | End: 2020-11-10

## 2020-11-10 RX ORDER — MIDAZOLAM HYDROCHLORIDE 1 MG/ML
1 INJECTION, SOLUTION INTRAMUSCULAR; INTRAVENOUS AS NEEDED
Status: DISCONTINUED | OUTPATIENT
Start: 2020-11-10 | End: 2020-11-10 | Stop reason: HOSPADM

## 2020-11-10 RX ORDER — AMOXICILLIN 250 MG
1 CAPSULE ORAL 2 TIMES DAILY
Status: DISCONTINUED | OUTPATIENT
Start: 2020-11-10 | End: 2020-11-13 | Stop reason: HOSPADM

## 2020-11-10 RX ORDER — SODIUM CHLORIDE, SODIUM LACTATE, POTASSIUM CHLORIDE, CALCIUM CHLORIDE 600; 310; 30; 20 MG/100ML; MG/100ML; MG/100ML; MG/100ML
25 INJECTION, SOLUTION INTRAVENOUS CONTINUOUS
Status: DISCONTINUED | OUTPATIENT
Start: 2020-11-10 | End: 2020-11-10 | Stop reason: HOSPADM

## 2020-11-10 RX ORDER — DIPHENHYDRAMINE HYDROCHLORIDE 50 MG/ML
12.5 INJECTION, SOLUTION INTRAMUSCULAR; INTRAVENOUS AS NEEDED
Status: DISCONTINUED | OUTPATIENT
Start: 2020-11-10 | End: 2020-11-10 | Stop reason: HOSPADM

## 2020-11-10 RX ORDER — FENTANYL CITRATE 50 UG/ML
25 INJECTION, SOLUTION INTRAMUSCULAR; INTRAVENOUS
Status: DISCONTINUED | OUTPATIENT
Start: 2020-11-10 | End: 2020-11-10 | Stop reason: HOSPADM

## 2020-11-10 RX ORDER — SODIUM CHLORIDE, SODIUM LACTATE, POTASSIUM CHLORIDE, CALCIUM CHLORIDE 600; 310; 30; 20 MG/100ML; MG/100ML; MG/100ML; MG/100ML
125 INJECTION, SOLUTION INTRAVENOUS CONTINUOUS
Status: DISCONTINUED | OUTPATIENT
Start: 2020-11-10 | End: 2020-11-10 | Stop reason: HOSPADM

## 2020-11-10 RX ORDER — NALOXONE HYDROCHLORIDE 0.4 MG/ML
0.4 INJECTION, SOLUTION INTRAMUSCULAR; INTRAVENOUS; SUBCUTANEOUS AS NEEDED
Status: DISCONTINUED | OUTPATIENT
Start: 2020-11-10 | End: 2020-11-13 | Stop reason: HOSPADM

## 2020-11-10 RX ORDER — SODIUM CHLORIDE 0.9 % (FLUSH) 0.9 %
5-40 SYRINGE (ML) INJECTION EVERY 8 HOURS
Status: DISCONTINUED | OUTPATIENT
Start: 2020-11-10 | End: 2020-11-13 | Stop reason: HOSPADM

## 2020-11-10 RX ORDER — HYDROXYZINE HYDROCHLORIDE 10 MG/1
10 TABLET, FILM COATED ORAL
Status: DISCONTINUED | OUTPATIENT
Start: 2020-11-10 | End: 2020-11-13 | Stop reason: HOSPADM

## 2020-11-10 RX ORDER — FAMOTIDINE 20 MG/1
20 TABLET, FILM COATED ORAL 2 TIMES DAILY
Status: DISCONTINUED | OUTPATIENT
Start: 2020-11-10 | End: 2020-11-13 | Stop reason: HOSPADM

## 2020-11-10 RX ORDER — POTASSIUM CHLORIDE 7.45 MG/ML
10 INJECTION INTRAVENOUS
Status: DISCONTINUED | OUTPATIENT
Start: 2020-11-10 | End: 2020-11-10

## 2020-11-10 RX ORDER — ACETAMINOPHEN 325 MG/1
650 TABLET ORAL EVERY 6 HOURS
Status: DISCONTINUED | OUTPATIENT
Start: 2020-11-10 | End: 2020-11-13 | Stop reason: HOSPADM

## 2020-11-10 RX ORDER — ROCURONIUM BROMIDE 10 MG/ML
INJECTION, SOLUTION INTRAVENOUS AS NEEDED
Status: DISCONTINUED | OUTPATIENT
Start: 2020-11-10 | End: 2020-11-10 | Stop reason: HOSPADM

## 2020-11-10 RX ORDER — OXYCODONE HYDROCHLORIDE 5 MG/1
5 TABLET ORAL
Status: DISCONTINUED | OUTPATIENT
Start: 2020-11-10 | End: 2020-11-13 | Stop reason: HOSPADM

## 2020-11-10 RX ORDER — MORPHINE SULFATE 2 MG/ML
2 INJECTION, SOLUTION INTRAMUSCULAR; INTRAVENOUS
Status: DISCONTINUED | OUTPATIENT
Start: 2020-11-10 | End: 2020-11-13 | Stop reason: HOSPADM

## 2020-11-10 RX ORDER — SODIUM CHLORIDE 0.9 % (FLUSH) 0.9 %
5-40 SYRINGE (ML) INJECTION AS NEEDED
Status: DISCONTINUED | OUTPATIENT
Start: 2020-11-10 | End: 2020-11-13 | Stop reason: HOSPADM

## 2020-11-10 RX ADMIN — FENTANYL CITRATE 50 MCG: 50 INJECTION, SOLUTION INTRAMUSCULAR; INTRAVENOUS at 09:55

## 2020-11-10 RX ADMIN — FAMOTIDINE 20 MG: 20 TABLET ORAL at 18:07

## 2020-11-10 RX ADMIN — VANCOMYCIN HYDROCHLORIDE 1000 MG: 1 INJECTION, POWDER, LYOPHILIZED, FOR SOLUTION INTRAVENOUS at 09:27

## 2020-11-10 RX ADMIN — MORPHINE SULFATE 2 MG: 2 INJECTION, SOLUTION INTRAMUSCULAR; INTRAVENOUS at 04:49

## 2020-11-10 RX ADMIN — DEXAMETHASONE SODIUM PHOSPHATE 4 MG: 4 INJECTION, SOLUTION INTRAMUSCULAR; INTRAVENOUS at 10:16

## 2020-11-10 RX ADMIN — Medication 81 MG: at 18:07

## 2020-11-10 RX ADMIN — PHENYLEPHRINE HYDROCHLORIDE 40 MCG/MIN: 10 INJECTION INTRAVENOUS at 09:55

## 2020-11-10 RX ADMIN — ONDANSETRON HYDROCHLORIDE 4 MG: 2 INJECTION, SOLUTION INTRAMUSCULAR; INTRAVENOUS at 10:49

## 2020-11-10 RX ADMIN — KETOROLAC TROMETHAMINE 15 MG: 30 INJECTION, SOLUTION INTRAMUSCULAR at 18:07

## 2020-11-10 RX ADMIN — ACETAMINOPHEN 650 MG: 325 TABLET ORAL at 18:07

## 2020-11-10 RX ADMIN — OXYCODONE HYDROCHLORIDE 5 MG: 5 TABLET ORAL at 19:07

## 2020-11-10 RX ADMIN — SODIUM CHLORIDE 75 ML/HR: 900 INJECTION, SOLUTION INTRAVENOUS at 11:48

## 2020-11-10 RX ADMIN — SUCCINYLCHOLINE CHLORIDE 100 MG: 20 INJECTION, SOLUTION INTRAMUSCULAR; INTRAVENOUS at 09:55

## 2020-11-10 RX ADMIN — PROPOFOL 40 MG: 10 INJECTION, EMULSION INTRAVENOUS at 10:39

## 2020-11-10 RX ADMIN — DOCUSATE SODIUM 50 MG AND SENNOSIDES 8.6 MG 1 TABLET: 8.6; 5 TABLET, FILM COATED ORAL at 18:07

## 2020-11-10 RX ADMIN — ROCURONIUM BROMIDE 5 MG: 10 SOLUTION INTRAVENOUS at 09:55

## 2020-11-10 RX ADMIN — BUSPIRONE HYDROCHLORIDE 10 MG: 10 TABLET ORAL at 22:02

## 2020-11-10 RX ADMIN — SODIUM CHLORIDE, POTASSIUM CHLORIDE, SODIUM LACTATE AND CALCIUM CHLORIDE: 600; 310; 30; 20 INJECTION, SOLUTION INTRAVENOUS at 09:41

## 2020-11-10 RX ADMIN — QUETIAPINE FUMARATE 12.5 MG: 25 TABLET ORAL at 22:03

## 2020-11-10 RX ADMIN — MORPHINE SULFATE 2 MG: 2 INJECTION, SOLUTION INTRAMUSCULAR; INTRAVENOUS at 00:47

## 2020-11-10 RX ADMIN — FENTANYL CITRATE 50 MCG: 50 INJECTION, SOLUTION INTRAMUSCULAR; INTRAVENOUS at 10:19

## 2020-11-10 RX ADMIN — BUSPIRONE HYDROCHLORIDE 10 MG: 10 TABLET ORAL at 16:11

## 2020-11-10 RX ADMIN — PROPOFOL 80 MG: 10 INJECTION, EMULSION INTRAVENOUS at 09:55

## 2020-11-10 RX ADMIN — POTASSIUM CHLORIDE 10 MEQ: 7.46 INJECTION, SOLUTION INTRAVENOUS at 07:27

## 2020-11-10 RX ADMIN — CEFAZOLIN 2 G: 330 INJECTION, POWDER, FOR SOLUTION INTRAMUSCULAR; INTRAVENOUS at 10:16

## 2020-11-10 RX ADMIN — LIDOCAINE HYDROCHLORIDE 40 MG: 20 INJECTION, SOLUTION EPIDURAL; INFILTRATION; INTRACAUDAL; PERINEURAL at 09:55

## 2020-11-10 RX ADMIN — MEMANTINE HYDROCHLORIDE 10 MG: 10 TABLET ORAL at 22:02

## 2020-11-10 RX ADMIN — FENTANYL CITRATE 25 MCG: 50 INJECTION, SOLUTION INTRAMUSCULAR; INTRAVENOUS at 08:47

## 2020-11-10 NOTE — PROGRESS NOTES
[facility]  Hospitalist Group                                                                                          Hospitalist Progress Note  Jesus Ordonez NP  Answering service: [phone] from in house phone        Date of Service:  11/10/2020  NAME:  Rory Costello  :  1925  MRN:  [id number]      Admission Summary:   Per H&P: Rory Costello is a 80 y.o. female who presents with fall  History is primary obtained from the patient  Patient presented to the ER from our [facility] with fall. Patient Billie Linton was being assisted when she had a fall. Patient came to the ER with apparent hip pain, was found to have a femoral fracture and was requested to be admitted to the hospitalist service. Patient has dementia, peers to be in significant pain, but no history could be obtained from the patient. I did speak to the patient's son Pamela Vang who reports that patient has dementia, is not able to hold a conversation, and is a DNR. No further history can be obtained from the patient or from the family    Interval history / Subjective:     Seen and examined patient. Awake, alert, and oriented x1. Hard of hearing. Went to OR today for right hip hemiarthroplasty. Right hip surgical dressing noted- CDI. Patient denies any pain. 2+ distal pulses. No acute distress noted. Assessment & Plan:     Right femoral fracture due to fall   - s/p right hip hemiarthroplasty (11/10)   - CT head pending    - Pain control   - PT/ OT consulted.      Hypertension   - Stable   - Monitor blood pressure   - Continue amlodipine    Hypokalemia   - Potassium 3.2  - Replaced  - Monitor labs       Dehydration   - Continue IV fluids   - Monitor labs   - Encourage PO intake     Dementia   - Fall precautions   - Supportive care   - Continue Buspar, Memantine, Quetiapine, Riperidone    Code status: Full   DVT prophylaxis: SCDs    Care Plan discussed with: Patient/Family and Nurse  Anticipated Disposition: SAH/Rehab  Anticipated Discharge: Greater than 48 hours     Hospital Problems  Date Reviewed: 11/10/2020          Codes Class Noted POA    Femoral neck fracture ([facility] [address] ICD-10-CM: A64.153T  ICD-9-CM: 820.8  11/10/2020 Unknown        Femur fracture ([facility] [address] ICD-10-CM: X35.60YP  ICD-9-CM: 821.00  11/9/2020 Unknown                Review of Systems:   A comprehensive review of systems was negative except for that written in the HPI. Vital Signs:    Last 24hrs VS reviewed since prior progress note. Most recent are:  Visit Vitals  BP (!) 141/51 (BP 1 Location: Right arm, BP Patient Position: At rest)   Pulse (!) 112   Temp 98.8 °F (37.1 °C)   Resp 16   SpO2 97%         Intake/Output Summary (Last 24 hours) at 11/10/2020 1751  Last data filed at 11/10/2020 1200  Gross per 24 hour   Intake 575 ml   Output 1400 ml   Net -825 ml        Physical Examination:             Constitutional:  No acute distress, cooperative, pleasant   ENT:  Oral mucosa moist, oropharynx benign. Resp:  CTA bilaterally. No wheezing/rhonchi/rales. No accessory muscle use   CV:  Regular rhythm, normal rate, no murmurs, gallops, rubs    GI:  Soft, non distended, non tender. normoactive bowel sounds, no hepatosplenomegaly     Musculoskeletal:  No edema, warm, 2+ pulses throughout. Right hip surgical dressing noted- CDI    Neurologic:  Moves all extremities with generalized weakness, follows commands.   AAOx1, CN II-XII reviewed     Psych:  Not anxious or agitated       Data Review:    Review and/or order of clinical lab test      Labs:     Recent Labs     11/10/20  0359 11/09/20  1100   WBC 10.3 9.0   HGB 14.7 15.6   HCT 43.7 47.9*    224     Recent Labs     11/10/20  0359 11/09/20  1100    141   K 3.2* 3.2*    105   CO2 24 25   BUN 17 20   CREA 0.65 0.95   * 121*   CA 8.6 9.5     Recent Labs     11/10/20  0359 11/09/20  1100   ALT 18 18   AP 69 74   TBILI 0.8 0.8   TP 6.6 7.3   ALB 3.4* 3.8   GLOB 3.2 3.5 Recent Labs     11/09/20  0349   INR 1.0   PTP 10.9      No results for input(s): FE, TIBC, PSAT, FERR in the last 72 hours. No results found for: FOL, RBCF   No results for input(s): PH, PCO2, PO2 in the last 72 hours. No results for input(s): CPK, CKNDX, TROIQ in the last 72 hours.     No lab exists for component: CPKMB  No results found for: CHOL, CHOLX, CHLST, CHOLV, HDL, HDLP, LDL, LDLC, DLDLP, TGLX, TRIGL, TRIGP, CHHD, CHHDX  Lab Results   Component Value Date/Time    Glucose (POC) 136 (H) 03/15/2011 06:52 PM     Lab Results   Component Value Date/Time    Color YELLOW/STRAW 11/09/2020 03:55 AM    Appearance CLEAR 11/09/2020 03:55 AM    Specific gravity 1.019 11/09/2020 03:55 AM    pH (UA) 7.0 11/09/2020 03:55 AM    Protein 30 (A) 11/09/2020 03:55 AM    Glucose Negative 11/09/2020 03:55 AM    Ketone 15 (A) 11/09/2020 03:55 AM    Bilirubin Negative 11/09/2020 03:55 AM    Urobilinogen 1.0 11/09/2020 03:55 AM    Nitrites Negative 11/09/2020 03:55 AM    Leukocyte Esterase SMALL (A) 11/09/2020 03:55 AM    Epithelial cells FEW 11/09/2020 03:55 AM    Bacteria Negative 11/09/2020 03:55 AM    WBC 0-4 11/09/2020 03:55 AM    RBC 0-5 11/09/2020 03:55 AM         Medications Reviewed:     Current Facility-Administered Medications   Medication Dose Route Frequency    morphine injection 2 mg  2 mg IntraVENous Q4H PRN    0.9% sodium chloride infusion  75 mL/hr IntraVENous CONTINUOUS    sodium chloride 0.9 % bolus infusion 500 mL  500 mL IntraVENous ONCE PRN    sodium chloride (NS) flush 5-40 mL  5-40 mL IntraVENous Q8H    sodium chloride (NS) flush 5-40 mL  5-40 mL IntraVENous PRN    acetaminophen (TYLENOL) tablet 650 mg  650 mg Oral Q6H    oxyCODONE IR (ROXICODONE) tablet 5 mg  5 mg Oral Q3H PRN    naloxone (NARCAN) injection 0.4 mg  0.4 mg IntraVENous PRN    hydrOXYzine HCL (ATARAX) tablet 10 mg  10 mg Oral Q8H PRN    famotidine (PEPCID) tablet 20 mg  20 mg Oral BID    senna-docusate (PERICOLACE) 8.6-50 mg per tablet 1 Tab  1 Tab Oral BID    [START ON 11/11/2020] polyethylene glycol (MIRALAX) packet 17 g  17 g Oral DAILY    [START ON 11/12/2020] bisacodyL (DULCOLAX) suppository 10 mg  10 mg Rectal DAILY PRN    aspirin delayed-release tablet 81 mg  81 mg Oral BID    ketorolac (TORADOL) injection 15 mg  15 mg IntraVENous Q6H    [START ON 11/11/2020] vancomycin (VANCOCIN) 1,000 mg in 0.9% sodium chloride 250 mL (VIAL-MATE)  1,000 mg IntraVENous Q18H    amLODIPine (NORVASC) tablet 2.5 mg  2.5 mg Oral DAILY    busPIRone (BUSPAR) tablet 10 mg  10 mg Oral TID    memantine (NAMENDA) tablet 10 mg  10 mg Oral Q12H    QUEtiapine (SEROquel) tablet 12.5 mg  12.5 mg Oral QHS    risperiDONE (RisperDAL) tablet 0.25 mg  0.25 mg Oral ACL    0.9% sodium chloride infusion  75 mL/hr IntraVENous CONTINUOUS    sodium chloride (NS) flush 5-40 mL  5-40 mL IntraVENous Q8H    sodium chloride (NS) flush 5-40 mL  5-40 mL IntraVENous PRN    naloxone (NARCAN) injection 0.4 mg  0.4 mg IntraVENous PRN     ______________________________________________________________________  EXPECTED LENGTH OF STAY: - - -  ACTUAL LENGTH OF STAY:          [address] [employer], NP

## 2020-11-10 NOTE — ANESTHESIA PREPROCEDURE EVALUATION
Relevant Problems   No relevant active problems       Anesthetic History   No history of anesthetic complications            Review of Systems / Medical History  Patient summary reviewed, nursing notes reviewed and pertinent labs reviewed    Pulmonary  Within defined limits                 Neuro/Psych   Within defined limits           Cardiovascular    Hypertension              Exercise tolerance: <4 METS     GI/Hepatic/Renal  Within defined limits              Endo/Other        Arthritis     Other Findings              Physical Exam    Airway  Mallampati: II  TM Distance: > 6 cm  Neck ROM: normal range of motion   Mouth opening: Normal     Cardiovascular  Regular rate and rhythm,  S1 and S2 normal,  no murmur, click, rub, or gallop             Dental  No notable dental hx       Pulmonary  Breath sounds clear to auscultation               Abdominal  GI exam deferred       Other Findings            Anesthetic Plan    ASA: 3  Anesthesia type: general          Induction: Intravenous  Anesthetic plan and risks discussed with: Patient

## 2020-11-10 NOTE — TELEPHONE ENCOUNTER
Alba Dunlap [phone]    Dr Elliott Castillo did the hip replacement on her mother this morning and it went fine.  Thank you both for all your help

## 2020-11-10 NOTE — PROGRESS NOTES
Calculate RCRI risk is class II, so 30 day risk of death, MI or cardiac arrest is 6.0%  EKG reviewed, sinus tachycardia, RBBB, and occasional PVC, no obvious ST-T wave changes  No further cardiac workup prior to surgery, as long as patient is chest pain free.

## 2020-11-10 NOTE — OP NOTES
Name: Cy Spaulding  MRN:  [id number]  : 1925  Age:  80 y.o. Surgery Date: 11/10/2020      OPERATIVE REPORT - RIGHT HIP HEMIARTHROPLASTY -   ANTERIOR APPROACH    PREOPERATIVE DIAGNOSIS: Femoral neck fracture right hip    POSTOPERATIVE DIAGNOSIS: Femoral neck fracture right hip    PROCEDURE PERFORMED: Right bipolar hemiarthroplasty    SURGEON: Maggie Morrissey MD    FIRST ASSISTANT:  Tra Loza PA-C    ANESTHESIA: General    PRE-OP ANTIBIOTIC: Ancef 2g    COMPLICATIONS: None. ESTIMATED BLOOD LOSS: 300 mL. SPECIMENS REMOVED: None    COMPONENTS IMPLANTED:   Implant Name Type Inv. Item Serial No.  Lot No. LRB No. Used Action   HEAD BPLR OD45MM [id number] FEM HIP SELF CNTR - SNA  HEAD BPLR OD45MM ID28MM FEM HIP SELF CNTR NA [employer] [id number] Right 1 Implanted   STEM FEM SZ 12 L150MM NK L38.5MM 41MM STD OFFSET 135DEG HIP - SNA  STEM FEM SZ 12 L150MM NK L38.5MM 41MM STD OFFSET 135DEG HIP NA [employer] [id number] Right 1 Implanted   HEAD FEM +1.5 25MM 12/14 [place] - SNA  HEAD FEM +1.5 25MM 12/14 [employer] NA [employer] [id number] Right 1 Implanted       INDICATIONS: The patient is an 80 yrs female with a right femoral neck fracture. Risks, benefits, alternatives of the procedure were reviewed and the patient desired to proceed. They understood the increased risk for perioperative medical complications due to their medical comorbidities. They were deemed medically optimized prior to surgery by the hospitalist service. DESCRIPTION OF PROCEDURE: Anesthetic was initiated. Preoperative dose of IV  antibiotic was given. Leone catheter was placed. The right side was confirmed as the operative side, prepped and draped in the usual sterile fashion. Skin was covered with Ioban occlusive dressing. One gram of tranexamic acid given intravenously if not contraindicated.      Direct anterior exposure was made to the patient's hip through the sartorius tensor interval well lateral of the ASIS to protect the LFCN. Anterior hip vasculature including the ascending branches of the lateral circumflex artery were cauterized. Retractors were taken out to observe for bleeding and there was none. A T capsulotomy was made with bovie electrocautery. The Charnley retractor was repositioned for exposure. The femoral neck was recut. Femoral head was removed from the acetabulum. Femur was positioned and elevated from the wound. Appropriate soft tissue releases were made including the posterior capsule and obturator internus. The medullary canal was entered with a box osteotome. The femur was broached to a size 12. Calcar planed and then trialed. A 45 mm , +1 hip ball was the most appropriate for leg length and tension with offset to best match native offset. The hip was dislocated. The trial was removed and the real stem was impacted. The real hip ball was placed. The hip was reduced. After copious irrigation, the capsule was closed with #1 Stratafix barbed sutures. I irrigated the skin, subcutaneous and deep wound. I closed the fascia of the tensor fascia yumiko with #1 stratafix barbed sutures. Skin and subcutaneous were irrigated. Soft tissues were infiltrated with local anesthetic. 2 grams of tranexamic acid with 0.4 mL of epi given topically in the wound. Skin and subcutaneous were closed in a standard fashion with 2-0 vicryl and 3-0 monocryl followed by Dermabond. An aquacel dressing was applied. Rosenda VINES-CONCEPCION was critical throughout the case to assist with positioning, retraction, instrument manipulation, and wound closure. Please note that no intern, resident, or other hospital surgical staff was available to assist during this procedure. There were no complications. No specimen was sent. The procedure was a RIGHT BIPOLAR HEMIARTHROPLASTY using a Depuy construct. The patient was transferred to the recovery room in stable condition.     [facility], MD

## 2020-11-10 NOTE — PERIOP NOTES
TRANSFER - OUT REPORT:    Verbal report given to Teodoro RN on Kurt Bae  being transferred to [address] for routine post - op       Report consisted of patients Situation, Background, Assessment and   Recommendations(SBAR). Time Pre op antibiotic given:1016, [id number]  Anesthesia Stop time: 1127  Leone Present on Transfer to floor:yes  Order for Leone on Chart:yes  Discharge Prescriptions with Chart:no    Information from the following report(s) SBAR, OR Summary, Procedure Summary and MAR was reviewed with the receiving nurse. Opportunity for questions and clarification was provided. Is the patient on 02? NO       L/Min        Other    Is the patient on a monitor? NO    Is the nurse transporting with the patient? NO    Surgical Waiting Area notified of patient's transfer from PACU?  NO      The following personal items collected during your admission accompanied patient upon transfer:   Dental Appliance: Dental Appliances: None  Vision:    Hearing Aid:    Jewelry:    Clothing:    Other Valuables:    Valuables sent to safe:

## 2020-11-10 NOTE — ROUTINE PROCESS
TRANSFER - IN REPORT: 
 
Verbal report received from [facility] [address] on [place] [employer]  being received from [address] [place] [address](unit) for ordered procedure Report consisted of patients Situation, Background, Assessment and  
Recommendations(SBAR). Information from the following report(s) SBAR was reviewed with the receiving nurse. Opportunity for questions and clarification was provided. Assessment completed upon patients arrival to unit and care assumed.

## 2020-11-10 NOTE — CONSULTS
FRACTURE CONSULT NOTE    Subjective:     Date of Consultation:  November 10, 2020  Referring Physician:  Ramila Curry is a 80 y.o. female who sustained GLF with displaced right femoral neck fracture. Patient Active Problem List    Diagnosis Date Noted    Femur fracture ([facility] [address] 11/09/2020    Moderate dementia with behavioral disturbance ([facility] [address] 02/20/2018    Primary insomnia 02/20/2018    Advance directive discussed with patient 03/13/2017    Other abnormal glucose 04/06/2012    Encounter for long-term (current) use of other medications 04/06/2012    Unspecified disorder of kidney and ureter 02/21/2011    Essential hypertension, benign 11/23/2009    Nausea alone 11/23/2009    Memory loss 11/23/2009    Symptomatic menopausal or female climacteric states 11/23/2009    Arthropathy, unspecified, site unspecified 11/23/2009    Disorder of bone and cartilage, unspecified 11/23/2009     Family History   Problem Relation Age of Onset    Heart Disease Mother         congestive heart failure    Stroke Father     Alcohol abuse Son     Alcohol abuse Daughter       Social History     Tobacco Use    Smoking status: Never Smoker    Smokeless tobacco: Never Used   Substance Use Topics    Alcohol use: No     Alcohol/week: 0.0 standard drinks     Past Medical History:   Diagnosis Date    DJD (degenerative joint disease)     Hematuria, microscopic     Hypertension     Memory loss     Menopause     Osteopenia       Past Surgical History:   Procedure Laterality Date    HX HYSTERECTOMY        Prior to Admission medications    Medication Sig Start Date End Date Taking? Authorizing Provider   risperiDONE (RisperDAL) 0.25 mg tablet Take 0.25 mg by mouth Daily (before lunch). Yes Provider, Historical   loratadine (CLARITIN) 10 mg tablet Take 1 Tab by mouth daily. For allergies. 8/27/20  Yes Rufina Bruce MD   busPIRone (BUSPAR) 10 mg tablet Take 1 Tab by mouth three (3) times daily.  2/11/20 Yes Prateek Bruce MD   amLODIPine (NORVASC) 2.5 mg tablet Take 1 Tab by mouth daily. 1/20/20  Yes Prateek Bruce MD   potassium chloride SR (KLOR-CON 8) 8 mEq tablet TAKE 1 TABLET BY MOUTH EVERY DAY 7/13/19  Yes Prateek Bruce MD   hydroCHLOROthiazide (MICROZIDE) 12.5 mg capsule TAKE ONE CAPSULE BY MOUTH EVERY MORNING 10/2/18  Yes Prateek Bruce MD   QUEtiapine (SEROQUEL) 25 mg tablet Take 0.5 Tabs by mouth nightly. 5/11/18  Yes Prateek Bruce MD   multivitamin (ONE A DAY) tablet Take 1 Tab by mouth daily. Yes Provider, Historical   memantine (NAMENDA) 10 mg tablet TAKE 1 TABLET BY MOUTH TWICE A DAY 5/4/16  Yes Prateek Bruce MD   acetaminophen (TYLENOL) 325 mg tablet Take  by mouth every four (4) hours as needed for Pain.     Provider, Historical     Current Facility-Administered Medications   Medication Dose Route Frequency    vancomycin (VANCOCIN) 1,000 mg in 0.9% sodium chloride (MBP/ADV) 250 mL  1,000 mg IntraVENous ONCE    morphine injection 2 mg  2 mg IntraVENous Q4H PRN    potassium chloride 10 mEq in 100 ml IVPB  10 mEq IntraVENous Q1H    fentaNYL citrate (PF) injection 25 mcg  25 mcg IntraVENous Q15MIN PRN    lactated Ringers infusion  25 mL/hr IntraVENous CONTINUOUS    0.9% sodium chloride infusion  25 mL/hr IntraVENous CONTINUOUS    sodium chloride (NS) flush 5-40 mL  5-40 mL IntraVENous Q8H    sodium chloride (NS) flush 5-40 mL  5-40 mL IntraVENous PRN    lidocaine (PF) (XYLOCAINE) 10 mg/mL (1 %) injection 0.1 mL  0.1 mL SubCUTAneous PRN    fentaNYL citrate (PF) injection 50 mcg  50 mcg IntraVENous PRN    midazolam (VERSED) injection 1 mg  1 mg IntraVENous PRN    midazolam (VERSED) injection 1 mg  1 mg IntraVENous PRN    acetaminophen (TYLENOL) tablet 650 mg  650 mg Oral ONCE    amLODIPine (NORVASC) tablet 2.5 mg  2.5 mg Oral DAILY    busPIRone (BUSPAR) tablet 10 mg  10 mg Oral TID    memantine (NAMENDA) tablet 10 mg  10 mg Oral Q12H    QUEtiapine (SEROquel) tablet 12.5 mg  12.5 mg Oral QHS    risperiDONE (RisperDAL) tablet 0.25 mg  0.25 mg Oral ACL    0.9% sodium chloride infusion  75 mL/hr IntraVENous CONTINUOUS    sodium chloride (NS) flush 5-40 mL  5-40 mL IntraVENous Q8H    sodium chloride (NS) flush 5-40 mL  5-40 mL IntraVENous PRN    naloxone (NARCAN) injection 0.4 mg  0.4 mg IntraVENous PRN      Allergies   Allergen Reactions    Cephalexin Nausea Only    Macrobid [Nitrofurantoin Monohyd/M-Cryst] Myalgia    Razadyne [Galantamine] Nausea Only    Sulfa (Sulfonamide Antibiotics) Rash        Review of Systems:    Could not obtain due to mental status    Objective:     Patient Vitals for the past 24 hrs:   Temp Pulse Resp BP SpO2   11/10/20 0915  80 16 (!) 144/80 100 %   11/10/20 0831  94 20 (!) 144/83 100 %   11/10/20 0329 98.7 °F (37.1 °C) (!) 107 20 (!) 168/83 97 %   20 2046 99.2 °F (37.3 °C) (!) 102 20 (!) 144/75 97 %   20 1751 97.5 °F (36.4 °C) (!) 117 20 (!) 163/91 97 %   20 1634 98.8 °F (37.1 °C) 98 18 (!) 144/63 96 %   20 1230    131/61 99 %   20 1200    (!) 139/57 100 %   20 1130    (!) 140/85 99 %   20 1100    (!) 145/77 99 %   20 1055 97.8 °F (36.6 °C) 79 24 (!) 167/83 99 %       Temp (24hrs), Av.4 °F (36.9 °C), Min:97.5 °F (36.4 °C), Max:99.2 °F (37.3 °C)      Gen: NAD, A&Ox3  Resp: Non-labored breathing  CV: Extremities well perfused  Abd: soft, NT  RLE: shortened, rotated, did not range due to pain, skin intact, warm well perfused, SILT in al distributions L3-S1, palpable pedal pulses, no calf tenderness  LLE: no pain with ROM, no swelling about knee or ankle, skin intact, warm well perfused, SILT in al distributions L3-S1, palpable pedal pulses, no calf tenderness    Imaging Review: Displaced right femoral neck     Labs:   Recent Results (from the past 24 hour(s))   CBC WITH AUTOMATED DIFF    Collection Time: 20 11:00 AM   Result Value Ref Range    WBC 9.0 3.6 - 11.0 K/uL    RBC 5.19 3.80 - 5.20 M/uL    HGB 15.6 11.5 - 16.0 g/dL    HCT 47.9 (H) 35.0 - 47.0 %    MCV 92.3 80.0 - 99.0 FL    MCH 30.1 26.0 - 34.0 PG    MCHC 32.6 30.0 - 36.5 g/dL    RDW 13.6 11.5 - 14.5 %    PLATELET 788 805 - 713 K/uL    MPV 9.8 8.9 - 12.9 FL    NRBC 0.0 0  WBC    ABSOLUTE NRBC 0.00 0.00 - 0.01 K/uL    NEUTROPHILS 70 32 - 75 %    LYMPHOCYTES 18 12 - 49 %    MONOCYTES 9 5 - 13 %    EOSINOPHILS 1 0 - 7 %    BASOPHILS 1 0 - 1 %    IMMATURE GRANULOCYTES 1 (H) 0.0 - 0.5 %    ABS. NEUTROPHILS 6.4 1.8 - 8.0 K/UL    ABS. LYMPHOCYTES 1.6 0.8 - 3.5 K/UL    ABS. MONOCYTES 0.8 0.0 - 1.0 K/UL    ABS. EOSINOPHILS 0.1 0.0 - 0.4 K/UL    ABS. BASOPHILS 0.1 0.0 - 0.1 K/UL    ABS. IMM. GRANS. 0.1 (H) 0.00 - 0.04 K/UL    DF AUTOMATED     METABOLIC PANEL, COMPREHENSIVE    Collection Time: 11/09/20 11:00 AM   Result Value Ref Range    Sodium 141 136 - 145 mmol/L    Potassium 3.2 (L) 3.5 - 5.1 mmol/L    Chloride 105 97 - 108 mmol/L    CO2 25 21 - 32 mmol/L    Anion gap 11 5 - 15 mmol/L    Glucose 121 (H) 65 - 100 mg/dL    BUN 20 6 - 20 MG/DL    Creatinine 0.95 0.55 - 1.02 MG/DL    BUN/Creatinine ratio 21 (H) 12 - 20      GFR est AA >60 >60 ml/min/1.73m2    GFR est non-AA 55 (L) >60 ml/min/1.73m2    Calcium 9.5 8.5 - 10.1 MG/DL    Bilirubin, total 0.8 0.2 - 1.0 MG/DL    ALT (SGPT) 18 12 - 78 U/L    AST (SGOT) 18 15 - 37 U/L    Alk. phosphatase 74 45 - 117 U/L    Protein, total 7.3 6.4 - 8.2 g/dL    Albumin 3.8 3.5 - 5.0 g/dL    Globulin 3.5 2.0 - 4.0 g/dL    A-G Ratio 1.1 1.1 - 2.2     SAMPLES BEING HELD    Collection Time: 11/09/20 11:00 AM   Result Value Ref Range    SAMPLES BEING HELD 1RED,BLUE     COMMENT        Add-on orders for these samples will be processed based on acceptable specimen integrity and analyte stability, which may vary by analyte.    EKG, 12 LEAD, INITIAL    Collection Time: 11/09/20  4:55 PM   Result Value Ref Range    Ventricular Rate 102 BPM    Atrial Rate 102 BPM    P-R Interval 156 ms    QRS Duration 132 ms    Q-T Interval 386 ms    QTC Calculation (Bezet) 503 ms    Calculated P Axis 61 degrees    Calculated R Axis 118 degrees    Calculated T Axis 35 degrees    Diagnosis       Sinus tachycardia with occasional and consecutive premature ventricular   complexes and fusion complexes  Right bundle branch block  Left posterior fascicular block  ** Bifascicular block **  When compared with ECG of 24-SEP-2020 14:39,  premature atrial complexes are no longer present  Left posterior fascicular block is now present  Confirmed by Vera Villela M.D., [employer] ([id number]) on 11/10/2020 8:11:00 AM     SARS-COV-2    Collection Time: 11/09/20  5:00 PM   Result Value Ref Range    Specimen source Nasopharyngeal      Specimen source NP SWAB     COVID-19 rapid test Not detected NOTD      Specimen type NP Swab      Health status Not performed     TYPE & SCREEN    Collection Time: 11/09/20  6:15 PM   Result Value Ref Range    Crossmatch Expiration 11/12/2020,2359     ABO/Rh(D) A POSITIVE     Antibody screen NEG    METABOLIC PANEL, COMPREHENSIVE    Collection Time: 11/10/20  3:59 AM   Result Value Ref Range    Sodium 138 136 - 145 mmol/L    Potassium 3.2 (L) 3.5 - 5.1 mmol/L    Chloride 107 97 - 108 mmol/L    CO2 24 21 - 32 mmol/L    Anion gap 7 5 - 15 mmol/L    Glucose 113 (H) 65 - 100 mg/dL    BUN 17 6 - 20 MG/DL    Creatinine 0.65 0.55 - 1.02 MG/DL    BUN/Creatinine ratio 26 (H) 12 - 20      GFR est AA >60 >60 ml/min/1.73m2    GFR est non-AA >60 >60 ml/min/1.73m2    Calcium 8.6 8.5 - 10.1 MG/DL    Bilirubin, total 0.8 0.2 - 1.0 MG/DL    ALT (SGPT) 18 12 - 78 U/L    AST (SGOT) 21 15 - 37 U/L    Alk.  phosphatase 69 45 - 117 U/L    Protein, total 6.6 6.4 - 8.2 g/dL    Albumin 3.4 (L) 3.5 - 5.0 g/dL    Globulin 3.2 2.0 - 4.0 g/dL    A-G Ratio 1.1 1.1 - 2.2     CBC WITH AUTOMATED DIFF    Collection Time: 11/10/20  3:59 AM   Result Value Ref Range    WBC 10.3 3.6 - 11.0 K/uL    RBC 4.82 3.80 - 5.20 M/uL    HGB 14.7 11.5 - 16.0 g/dL    HCT 43.7 35.0 - 47.0 %    MCV 90.7 80.0 - 99.0 FL    MCH 30.5 26.0 - 34.0 PG    MCHC 33.6 30.0 - 36.5 g/dL    RDW 13.6 11.5 - 14.5 %    PLATELET 085 597 - 437 K/uL    MPV 9.8 8.9 - 12.9 FL    NRBC 0.0 0  WBC    ABSOLUTE NRBC 0.00 0.00 - 0.01 K/uL    NEUTROPHILS 80 (H) 32 - 75 %    LYMPHOCYTES 9 (L) 12 - 49 %    MONOCYTES 10 5 - 13 %    EOSINOPHILS 1 0 - 7 %    BASOPHILS 0 0 - 1 %    IMMATURE GRANULOCYTES 0 0.0 - 0.5 %    ABS. NEUTROPHILS 8.2 (H) 1.8 - 8.0 K/UL    ABS. LYMPHOCYTES 1.0 0.8 - 3.5 K/UL    ABS. MONOCYTES 1.0 0.0 - 1.0 K/UL    ABS. EOSINOPHILS 0.1 0.0 - 0.4 K/UL    ABS. BASOPHILS 0.0 0.0 - 0.1 K/UL    ABS. IMM.  GRANS. 0.0 0.00 - 0.04 K/UL    DF AUTOMATED           Current Facility-Administered Medications   Medication Dose Route Frequency    vancomycin (VANCOCIN) 1,000 mg in 0.9% sodium chloride (MBP/ADV) 250 mL  1,000 mg IntraVENous ONCE    morphine injection 2 mg  2 mg IntraVENous Q4H PRN    potassium chloride 10 mEq in 100 ml IVPB  10 mEq IntraVENous Q1H    fentaNYL citrate (PF) injection 25 mcg  25 mcg IntraVENous Q15MIN PRN    lactated Ringers infusion  25 mL/hr IntraVENous CONTINUOUS    0.9% sodium chloride infusion  25 mL/hr IntraVENous CONTINUOUS    sodium chloride (NS) flush 5-40 mL  5-40 mL IntraVENous Q8H    sodium chloride (NS) flush 5-40 mL  5-40 mL IntraVENous PRN    lidocaine (PF) (XYLOCAINE) 10 mg/mL (1 %) injection 0.1 mL  0.1 mL SubCUTAneous PRN    fentaNYL citrate (PF) injection 50 mcg  50 mcg IntraVENous PRN    midazolam (VERSED) injection 1 mg  1 mg IntraVENous PRN    midazolam (VERSED) injection 1 mg  1 mg IntraVENous PRN    acetaminophen (TYLENOL) tablet 650 mg  650 mg Oral ONCE    amLODIPine (NORVASC) tablet 2.5 mg  2.5 mg Oral DAILY    busPIRone (BUSPAR) tablet 10 mg  10 mg Oral TID    memantine (NAMENDA) tablet 10 mg  10 mg Oral Q12H    QUEtiapine (SEROquel) tablet 12.5 mg  12.5 mg Oral QHS    risperiDONE (RisperDAL) tablet 0.25 mg  0.25 mg Oral ACL    0.9% sodium chloride infusion  75 mL/hr IntraVENous CONTINUOUS    sodium chloride (NS) flush 5-40 mL  5-40 mL IntraVENous Q8H    sodium chloride (NS) flush 5-40 mL  5-40 mL IntraVENous PRN    naloxone (NARCAN) injection 0.4 mg  0.4 mg IntraVENous PRN         Impression:     Active Problems:    Femur fracture (HCC) (11/9/2020)    81 yo female s/p GLF with dementia who ambulates with Displaced right femoral neck fracture  Hx CHF    Plan:   Plan for right hip lisa  Elevated risk due to age and cardiac hx  Family understands risk and wishes to proceed        Shane Piedra MD

## 2020-11-10 NOTE — ROUTINE PROCESS
Patient: January Davison MRN: [id number]  SSN: [id number] YOB: 1925  Age: 80 y.o. Sex: female Patient is status post Procedure(s): RIGHT HIP HEMIARTHROPLASTY  WANTS BRANDO TABLE  ESSENTIAL  DEPUY AWARE  AVAILABLE AT 0800. Surgeon(s) and Role: * Naresh Wing MD - Primary Local/Dose/Irrigation:  65 cc exparel mix - band in place Peripheral IV 11/09/20 Left;Posterior Hand (Active) Airway - Endotracheal Tube 11/10/20 Oral (Active) Dressing/Packing:  Wound Hip Right-Dressing/Treatment: (Aquacel, Dermabond) (11/10/20 1057) Splint/Cast:  ] Other:  Leone in place

## 2020-11-10 NOTE — ANESTHESIA POSTPROCEDURE EVALUATION
Post-Anesthesia Evaluation and Assessment    Patient: Cy Spaulding MRN: [id number]  SSN: [id number]    YOB: 1925  Age: 80 y.o. Sex: female       Cardiovascular Function/Vital Signs  Visit Vitals  BP (!) 141/63   Pulse (!) 105   Temp 36.6 °C (97.8 °F)   Resp 15   SpO2 96%       Patient is status post General anesthesia for Procedure(s):  RIGHT HIP HEMIARTHROPLASTY  TI MICHAELS TABLE  ESSENTIAL  DEPUY AWARE  AVAILABLE AT 0800. Nausea/Vomiting: None    Postoperative hydration reviewed and adequate. Pain:  Pain Scale 1: Visual (11/10/20 1200)  Pain Intensity 1: 0 (11/10/20 1200)   Managed    Neurological Status:   Neuro (WDL): Exceptions to WDL (11/10/20 1200)  Neuro  Neurologic State: Confused (11/10/20 1200)  Orientation Level: Disoriented X4 (11/10/20 1200)  Cognition: Memory loss (11/10/20 1200)  Speech: Clear (11/09/20 2126)  LUE Motor Response: Purposeful (11/10/20 1200)  LLE Motor Response: Purposeful (11/10/20 1200)  RUE Motor Response: Purposeful (11/10/20 1200)  RLE Motor Response: Numbness (11/10/20 1200)   At baseline    Mental Status and Level of Consciousness: Alert and oriented to person, place, and time    Pulmonary Status:   O2 Device: Room air (11/10/20 1200)   Adequate oxygenation and airway patent    Complications related to anesthesia: None    Post-anesthesia assessment completed. No concerns    Signed By: Matilde Perera MD     November 10, 2020              Procedure(s):  RIGHT HIP HEMIARTHROPLASTY  TI MICHAELS TABLE  ESSENTIAL  DEPUY AWARE  AVAILABLE AT 0800. general    <BSHSIANPOST>    INITIAL Post-op Vital signs:   Vitals Value Taken Time   /74 11/10/2020  2:00 PM   Temp 36.6 °C (97.8 °F) 11/10/2020 11:26 AM   Pulse 93 11/10/2020  2:05 PM   Resp 16 11/10/2020  2:05 PM   SpO2 96 % 11/10/2020  2:05 PM   Vitals shown include unvalidated device data.

## 2020-11-10 NOTE — TELEPHONE ENCOUNTER
Spoke with pt's daughter Medina Alejo. Thanked her for the update. Will keep pt and family in our prayers. Please keep us posted on how she is doing.  Will forward to MD.

## 2020-11-11 ENCOUNTER — APPOINTMENT (OUTPATIENT)
Dept: CT IMAGING | Age: 85
DRG: 522 | End: 2020-11-11
Attending: PHYSICIAN ASSISTANT
Payer: MEDICARE

## 2020-11-11 LAB
ANION GAP SERPL CALC-SCNC: 9 MMOL/L (ref 5–15)
BASOPHILS # BLD: 0 K/UL (ref 0–0.1)
BASOPHILS NFR BLD: 0 % (ref 0–1)
BUN SERPL-MCNC: 17 MG/DL (ref 6–20)
BUN/CREAT SERPL: 27 (ref 12–20)
CALCIUM SERPL-MCNC: 8.8 MG/DL (ref 8.5–10.1)
CHLORIDE SERPL-SCNC: 111 MMOL/L (ref 97–108)
CO2 SERPL-SCNC: 20 MMOL/L (ref 21–32)
CREAT SERPL-MCNC: 0.64 MG/DL (ref 0.55–1.02)
DIFFERENTIAL METHOD BLD: ABNORMAL
EOSINOPHIL # BLD: 0 K/UL (ref 0–0.4)
EOSINOPHIL NFR BLD: 0 % (ref 0–7)
ERYTHROCYTE [DISTWIDTH] IN BLOOD BY AUTOMATED COUNT: 13.9 % (ref 11.5–14.5)
GLUCOSE SERPL-MCNC: 93 MG/DL (ref 65–100)
HCT VFR BLD AUTO: 45 % (ref 35–47)
HGB BLD-MCNC: 14.7 G/DL (ref 11.5–16)
IMM GRANULOCYTES # BLD AUTO: 0.1 K/UL (ref 0–0.04)
IMM GRANULOCYTES NFR BLD AUTO: 1 % (ref 0–0.5)
LYMPHOCYTES # BLD: 0.9 K/UL (ref 0.8–3.5)
LYMPHOCYTES NFR BLD: 7 % (ref 12–49)
MCH RBC QN AUTO: 30.4 PG (ref 26–34)
MCHC RBC AUTO-ENTMCNC: 32.7 G/DL (ref 30–36.5)
MCV RBC AUTO: 93 FL (ref 80–99)
MONOCYTES # BLD: 1.7 K/UL (ref 0–1)
MONOCYTES NFR BLD: 13 % (ref 5–13)
NEUTS SEG # BLD: 10.3 K/UL (ref 1.8–8)
NEUTS SEG NFR BLD: 79 % (ref 32–75)
NRBC # BLD: 0 K/UL (ref 0–0.01)
NRBC BLD-RTO: 0 PER 100 WBC
PLATELET # BLD AUTO: 182 K/UL (ref 150–400)
POTASSIUM SERPL-SCNC: 3.9 MMOL/L (ref 3.5–5.1)
RBC # BLD AUTO: 4.84 M/UL (ref 3.8–5.2)
RBC MORPH BLD: ABNORMAL
SODIUM SERPL-SCNC: 140 MMOL/L (ref 136–145)
WBC # BLD AUTO: 13 K/UL (ref 3.6–11)

## 2020-11-11 PROCEDURE — 51798 US URINE CAPACITY MEASURE: CPT

## 2020-11-11 PROCEDURE — 80048 BASIC METABOLIC PNL TOTAL CA: CPT

## 2020-11-11 PROCEDURE — 77030038269 HC DRN EXT URIN PURWCK BARD -A

## 2020-11-11 PROCEDURE — 74011250636 HC RX REV CODE- 250/636: Performed by: PHYSICIAN ASSISTANT

## 2020-11-11 PROCEDURE — 36415 COLL VENOUS BLD VENIPUNCTURE: CPT

## 2020-11-11 PROCEDURE — 97535 SELF CARE MNGMENT TRAINING: CPT

## 2020-11-11 PROCEDURE — 97161 PT EVAL LOW COMPLEX 20 MIN: CPT

## 2020-11-11 PROCEDURE — 74011250637 HC RX REV CODE- 250/637: Performed by: PHYSICIAN ASSISTANT

## 2020-11-11 PROCEDURE — 97530 THERAPEUTIC ACTIVITIES: CPT

## 2020-11-11 PROCEDURE — 65270000029 HC RM PRIVATE

## 2020-11-11 PROCEDURE — 97166 OT EVAL MOD COMPLEX 45 MIN: CPT

## 2020-11-11 PROCEDURE — 87635 SARS-COV-2 COVID-19 AMP PRB: CPT

## 2020-11-11 PROCEDURE — 85025 COMPLETE CBC W/AUTO DIFF WBC: CPT

## 2020-11-11 RX ADMIN — DOCUSATE SODIUM 50 MG AND SENNOSIDES 8.6 MG 1 TABLET: 8.6; 5 TABLET, FILM COATED ORAL at 18:29

## 2020-11-11 RX ADMIN — DOCUSATE SODIUM 50 MG AND SENNOSIDES 8.6 MG 1 TABLET: 8.6; 5 TABLET, FILM COATED ORAL at 10:29

## 2020-11-11 RX ADMIN — FAMOTIDINE 20 MG: 20 TABLET ORAL at 10:30

## 2020-11-11 RX ADMIN — AMLODIPINE BESYLATE 2.5 MG: 5 TABLET ORAL at 10:30

## 2020-11-11 RX ADMIN — FAMOTIDINE 20 MG: 20 TABLET ORAL at 18:29

## 2020-11-11 RX ADMIN — Medication 10 ML: at 14:30

## 2020-11-11 RX ADMIN — Medication 81 MG: at 10:30

## 2020-11-11 RX ADMIN — BUSPIRONE HYDROCHLORIDE 10 MG: 10 TABLET ORAL at 10:29

## 2020-11-11 RX ADMIN — BUSPIRONE HYDROCHLORIDE 10 MG: 10 TABLET ORAL at 18:29

## 2020-11-11 RX ADMIN — KETOROLAC TROMETHAMINE 15 MG: 30 INJECTION, SOLUTION INTRAMUSCULAR at 00:00

## 2020-11-11 RX ADMIN — VANCOMYCIN HYDROCHLORIDE 1000 MG: 1 INJECTION, POWDER, LYOPHILIZED, FOR SOLUTION INTRAVENOUS at 03:00

## 2020-11-11 RX ADMIN — KETOROLAC TROMETHAMINE 15 MG: 30 INJECTION, SOLUTION INTRAMUSCULAR at 13:09

## 2020-11-11 RX ADMIN — POLYETHYLENE GLYCOL 3350 17 G: 17 POWDER, FOR SOLUTION ORAL at 10:31

## 2020-11-11 RX ADMIN — RISPERIDONE 0.25 MG: 0.5 TABLET ORAL at 10:31

## 2020-11-11 RX ADMIN — MEMANTINE HYDROCHLORIDE 10 MG: 10 TABLET ORAL at 10:30

## 2020-11-11 RX ADMIN — SODIUM CHLORIDE 75 ML/HR: 900 INJECTION, SOLUTION INTRAVENOUS at 00:00

## 2020-11-11 RX ADMIN — ACETAMINOPHEN 650 MG: 325 TABLET ORAL at 18:29

## 2020-11-11 RX ADMIN — ACETAMINOPHEN 650 MG: 325 TABLET ORAL at 13:09

## 2020-11-11 RX ADMIN — KETOROLAC TROMETHAMINE 15 MG: 30 INJECTION, SOLUTION INTRAMUSCULAR at 06:43

## 2020-11-11 NOTE — PROGRESS NOTES
Problem: Mobility Impaired (Adult and Pediatric)  Goal: *Acute Goals and Plan of Care (Insert Text)  Description: FUNCTIONAL STATUS PRIOR TO ADMISSION: Patient was in memory care prior to admission and was ambulatory without an assistive device per ortho note. HOME SUPPORT PRIOR TO ADMISSION: Patient lived in a nursing facility. Physical Therapy Goals  Initiated 11/11/2020  1. Patient will move from supine to sit and sit to supine  and roll side to side in bed with minimal assistance/contact guard assist within 7 day(s). 2.  Patient will transfer from bed to chair and chair to bed with minimal assistance/contact guard assist using the least restrictive device within 7 day(s). 3.  Patient will perform sit to stand with minimal assistance/contact guard assist within 7 day(s). 4.  Patient will ambulate with minimal assistance/contact guard assist for 25 feet with the least restrictive device within 7 day(s). Outcome: Progressing Towards Goal   PHYSICAL THERAPY EVALUATION  Patient: Serjio Pineda (12 y.o. female)  Date: 11/11/2020  Primary Diagnosis: Femur fracture (HCC) [S72.90XA]  Femoral neck fracture ([address] [S72.009A]  Procedure(s) (LRB):  RIGHT HIP HEMIARTHROPLASTY  WANTS HANA TABLE  ESSENTIAL  DEPUY AWARE  AVAILABLE AT 0800 (Right) 1 Day Post-Op   Precautions:   Fall, WBAT      ASSESSMENT  Based on the objective data described below, the patient presents with decreased mobility compared to baseline after a GLF and R hip fx. She underwent R hip hemiarthroplasty yesterday. She has severe dementia and is not able to carry on a meaningful conversation, but she does intermittently follow verbal and gestured instructions. She demonstrates good strength overall and minimal pain in the R hip with motion. Due to her limited ability to follow instructions, opted to sit her up in chair position in the bed to assess her tolerance to that position. She was fairly comfortable in sitting and VSS.   In next session, will attempt sitting EOB. Expect that she will be slow to progress with her mobility given her dementia and that she will need to be in healthcare section of [address] to improve her mobility prior to returning to memory care. Current Level of Function Impacting Discharge (mobility/balance): max assist, has not been OOB yet    Functional Outcome Measure: The patient scored Total: 5/100 on the Barthel Index which is indicative of severely impaired ability to care for basic self needs/dependency on others. Other factors to consider for discharge: will need rehab in her facility     Patient will benefit from skilled therapy intervention to address the above noted impairments. PLAN :  Recommendations and Planned Interventions: bed mobility training, transfer training, gait training, therapeutic exercises, patient and family training/education, and therapeutic activities      Frequency/Duration: Patient will be followed by physical therapy:  twice daily to address goals. Recommendation for discharge: (in order for the patient to meet his/her long term goals)  Therapy up to 5 days/week in SNF setting    This discharge recommendation:  Has not yet been discussed the attending provider and/or case management    IF patient discharges home will need the following DME: to be determined (TBD)         SUBJECTIVE:   Patient stated It think it ticked it.     OBJECTIVE DATA SUMMARY:   HISTORY:    Past Medical History:   Diagnosis Date    DJD (degenerative joint disease)     Hematuria, microscopic     Hypertension     Memory loss     Menopause     Osteopenia      Past Surgical History:   Procedure Laterality Date    HX HYSTERECTOMY         Personal factors and/or comorbidities impacting plan of care: as noted able, R hip fracture with hemiarthroplasty         EXAMINATION/PRESENTATION/DECISION MAKING:   Critical Behavior:  Neurologic State: Alert, Confused  Orientation Level: Oriented to person  Cognition: Memory loss     Hearing: Auditory  Auditory Impairment: Hard of hearing, bilateral  Skin:  post op bandage in place R anterior hip, otherwise per nursing  Edema: minimal R thigh edema  Range Of Motion:  AROM: Generally decreased, functional(limited ability to follow verbal commands)                       Strength:    Strength: Generally decreased, functional(limited ability to follow commands, R hip limited by pain)                    Tone & Sensation:   Tone: Normal              Sensation: Intact               Coordination:  Coordination: Generally decreased, functional  Vision:      Functional Mobility:  Bed Mobility:              Transfers:                             Balance:      Ambulation/Gait Training:                    Right Side Weight Bearing: As tolerated  Left Side Weight Bearing: Full                                 Stairs: Therapeutic Exercises:   AROM of UEs and LEs in supine and sitting in chair position    Functional Measure:  Barthel Index:    Bathin  Bladder: 0  Bowels: 0  Groomin  Dressin  Feedin  Mobility: 0  Stairs: 0  Toilet Use: 0  Transfer (Bed to Chair and Back): 0  Total: 5/100       The Barthel ADL Index: Guidelines  1. The index should be used as a record of what a patient does, not as a record of what a patient could do. 2. The main aim is to establish degree of independence from any help, physical or verbal, however minor and for whatever reason. 3. The need for supervision renders the patient not independent. 4. A patient's performance should be established using the best available evidence. Asking the patient, friends/relatives and nurses are the usual sources, but direct observation and common sense are also important. However direct testing is not needed. 5. Usually the patient's performance over the preceding 24-48 hours is important, but occasionally longer periods will be relevant.   6. Middle categories imply that the patient supplies over 50 per cent of the effort. 7. Use of aids to be independent is allowed. Gaby Li., Barthel, D.W. ([id number]). Functional evaluation: the Barthel Index. [facility] (14)2. CLAUDIA Zavala Cordella Moons., Luis Garcia., [place], [address] (1999). Measuring the change indisability after inpatient rehabilitation; comparison of the responsiveness of the Barthel Index and Functional [place] Measure. Journal of Neurology, Neurosurgery, and Psychiatry, 66(4), 911-578. JUJU Reed.A, YARELI Melendez, & Allegra Robles M.A. (2004.) Assessment of post-stroke quality of life in cost-effectiveness studies: The usefulness of the Barthel Index and the EuroQoL-5D. Quality of Life Research, 15, 927-62        Physical Therapy Evaluation Charge Determination   History Examination Presentation Decision-Making   HIGH Complexity :3+ comorbidities / personal factors will impact the outcome/ POC  MEDIUM Complexity : 3 Standardized tests and measures addressing body structure, function, activity limitation and / or participation in recreation  MEDIUM Complexity : Evolving with changing characteristics  LOW Complexity : FOTO score of       Based on the above components, the patient evaluation is determined to be of the following complexity level: LOW     Pain Rating:  Pain initially with hip flexion, but resolved after only a few seconds    Activity Tolerance:   Good    After treatment patient left in no apparent distress:   Call bell within reach, Bed / chair alarm activated, Side rails x 3, and bed in chair position, nursing students in to bathe her and change linens    COMMUNICATION/EDUCATION:   The patients plan of care was discussed with: Occupational therapist and Registered nurse. Fall prevention education was provided and the patient/caregiver indicated understanding., Patient/family have participated as able in goal setting and plan of care. , and Patient/family agree to work toward stated goals and plan of care.     Thank you for this referral.  Lori Marquez, PT   Time Calculation: 21 mins

## 2020-11-11 NOTE — PROGRESS NOTES
NELDA:   RUR: 13%    -Anticipate d/c to [facility] for STR; referral accepted  -Pt is Long Term Resident at [facility] of [address] [facility] transport  -Covid test ordered 11/11; will need negative result 72 hours prior to SNF placement  -Will need 2nd IMM letter prior to d/c    - s/p right hip hemiarthroplasty (11/10)   -CT head pending  -IVF for dehydration  -PT/OT following    1600- CM noted that PT & OT recommendation is SNF at d/c.     CM contacted pt's son Humphrey Lindsey, & he is requesting that referral be placed to [facility] for [address]. Referral placed in [employer]. CM to follow for transitions of care. Radha Reed RN BSN CCM  CRM    Transition of Care Plan:     The Plan for Transition of Care is related to the following treatment goals: SNF    The Patient and/or patient representative, son, Sb Cano was provided with a choice of provider and agrees  with the discharge plan. Yes [x] No []    A Freedom of choice list was provided with basic dialogue that supports the patient's individualized plan of care/goals and shares the quality data associated with the providers.        Yes [x] No []

## 2020-11-11 NOTE — PROGRESS NOTES
[facility] Adult  Hospitalist Group                                                                                          Hospitalist Progress Note  Stark Crigler, NP  Answering service: [phone] OR [phone] from in house phone        Date of Service:  2020  NAME:  Dayanna Delgado  :  1925  MRN:  [id number]      Admission Summary:   Per H&P: Nicanor Ann a 89 y. o. female who presents with fall  History is primary obtained from the patient  Patient presented to the ER from our [facility] with fall. Kaye Rivera was being assisted when she had a fall. Kaye Stiles came to the ER with apparent hip pain, was found to have a femoral fracture and was requested to be admitted to the hospitalist service. Kaye Stiles has dementia, peers to be in significant pain, but no history could be obtained from the patient.  I did speak to the patient's son Suni Reyna who reports that patient has dementia, is not able to hold a conversation, and is a DNR.  No further history can be obtained from the patient or from the family    Interval history / Subjective:    Seen and examined patient. Awake, alert, and oriented x1. States that she is fine today. POD # 2 for right hip hemiarthroplasty. Surgical dressing noted that's clean, dry, and intact. Patient denies pain. Distal pulses 2+. No acute distress noted. No overnight events noted. Assessment & Plan:     Right femoral fracture due to fall   - s/p right hip hemiarthroplasty (11/10)   - CT head pending    - Pain control   - PT/ OT following      Hypertension   - Stable   - Monitor blood pressure   - Continue amlodipine     Hypokalemia   - Improved.    - Potassium 3.9  - Monitor labs         Dehydration   - Continue IV fluids   - Monitor labs   - Encourage PO intake      Dementia   - Fall precautions   - Supportive care   - Continue Buspar, Memantine, Quetiapine, Riperidone      Code status:  Full   DVT prophylaxis: SCDs    Care Plan discussed with: Patient/Family and Nurse  Anticipated Disposition: SAH/Rehab  Anticipated Discharge: Greater than 48 hours     Hospital Problems  Date Reviewed: 11/10/2020          Codes Class Noted POA    Femoral neck fracture ([facility] [address] ICD-10-CM: Y59.707S  ICD-9-CM: 820.8  11/10/2020 Unknown        Femur fracture ([facility] [address] ICD-10-CM: V32.54KA  ICD-9-CM: 821.00  11/9/2020 Unknown                Review of Systems:   A comprehensive review of systems was negative except for that written in the HPI. Vital Signs:    Last 24hrs VS reviewed since prior progress note. Most recent are:  Visit Vitals  /78   Pulse 86   Temp 97.6 °F (36.4 °C)   Resp 16   Wt 57.7 kg (127 lb 3.3 oz)   SpO2 97%   BMI 24.04 kg/m²         Intake/Output Summary (Last 24 hours) at 11/11/2020 0940  Last data filed at 11/11/2020 0903  Gross per 24 hour   Intake 695 ml   Output 1500 ml   Net -805 ml        Physical Examination:             Constitutional:  No acute distress, cooperative, pleasant    ENT:  Oral mucosa moist, oropharynx benign. Resp:  CTA bilaterally. No wheezing/rhonchi/rales. No accessory muscle use   CV:  Regular rhythm, normal rate, no murmurs, gallops, rubs    GI:  Soft, non distended, non tender. normoactive bowel sounds, no hepatosplenomegaly     Musculoskeletal:  No edema, warm, 2+ pulses throughout. Right hip surgical dressing noted- CDI. Neurologic:  Moves all extremities with generalized weakness. Moves all extremities.   AAOx1, CN II-XII reviewed     Psych:  Not anxious or agitated       Data Review:    Review and/or order of clinical lab test  Review and/or order of tests in the radiology section of CPT  Review and/or order of tests in the medicine section of CPT      Labs:     Recent Labs     11/11/20  0514 11/10/20  0359   WBC 13.0* 10.3   HGB 14.7 14.7   HCT 45.0 43.7    206     Recent Labs     11/11/20  0514 11/10/20  0359 11/09/20  1100    138 141   K 3.9 3.2* 3.2*   * 107 105   CO2 20* 24 25   BUN 17 17 20   CREA 0.64 0.65 0.95   GLU 93 113* 121*   CA 8.8 8.6 9.5     Recent Labs     11/10/20  0359 11/09/20  1100   ALT 18 18   AP 69 74   TBILI 0.8 0.8   TP 6.6 7.3   ALB 3.4* 3.8   GLOB 3.2 3.5     Recent Labs     11/09/20  0349   INR 1.0   PTP 10.9      No results for input(s): FE, TIBC, PSAT, FERR in the last 72 hours. No results found for: FOL, RBCF   No results for input(s): PH, PCO2, PO2 in the last 72 hours. No results for input(s): CPK, CKNDX, TROIQ in the last 72 hours.     No lab exists for component: CPKMB  No results found for: CHOL, CHOLX, CHLST, CHOLV, HDL, HDLP, LDL, LDLC, DLDLP, TGLX, TRIGL, TRIGP, CHHD, CHHDX  Lab Results   Component Value Date/Time    Glucose (POC) 136 (H) 03/15/2011 06:52 PM     Lab Results   Component Value Date/Time    Color YELLOW/STRAW 11/09/2020 03:55 AM    Appearance CLEAR 11/09/2020 03:55 AM    Specific gravity 1.019 11/09/2020 03:55 AM    pH (UA) 7.0 11/09/2020 03:55 AM    Protein 30 (A) 11/09/2020 03:55 AM    Glucose Negative 11/09/2020 03:55 AM    Ketone 15 (A) 11/09/2020 03:55 AM    Bilirubin Negative 11/09/2020 03:55 AM    Urobilinogen 1.0 11/09/2020 03:55 AM    Nitrites Negative 11/09/2020 03:55 AM    Leukocyte Esterase SMALL (A) 11/09/2020 03:55 AM    Epithelial cells FEW 11/09/2020 03:55 AM    Bacteria Negative 11/09/2020 03:55 AM    WBC 0-4 11/09/2020 03:55 AM    RBC 0-5 11/09/2020 03:55 AM         Medications Reviewed:     Current Facility-Administered Medications   Medication Dose Route Frequency    morphine injection 2 mg  2 mg IntraVENous Q4H PRN    0.9% sodium chloride infusion  75 mL/hr IntraVENous CONTINUOUS    sodium chloride 0.9 % bolus infusion 500 mL  500 mL IntraVENous ONCE PRN    sodium chloride (NS) flush 5-40 mL  5-40 mL IntraVENous Q8H    sodium chloride (NS) flush 5-40 mL  5-40 mL IntraVENous PRN    acetaminophen (TYLENOL) tablet 650 mg  650 mg Oral Q6H    oxyCODONE IR (ROXICODONE) tablet 5 mg  5 mg Oral Q3H PRN    naloxone [facility]) injection 0.4 mg  0.4 mg IntraVENous PRN    hydrOXYzine HCL (ATARAX) tablet 10 mg  10 mg Oral Q8H PRN    famotidine (PEPCID) tablet 20 mg  20 mg Oral BID    senna-docusate (PERICOLACE) 8.6-50 mg per tablet 1 Tab  1 Tab Oral BID    polyethylene glycol (MIRALAX) packet 17 g  17 g Oral DAILY    [START ON 11/12/2020] bisacodyL (DULCOLAX) suppository 10 mg  10 mg Rectal DAILY PRN    aspirin delayed-release tablet 81 mg  81 mg Oral BID    ketorolac (TORADOL) injection 15 mg  15 mg IntraVENous Q6H    amLODIPine (NORVASC) tablet 2.5 mg  2.5 mg Oral DAILY    busPIRone (BUSPAR) tablet 10 mg  10 mg Oral TID    memantine (NAMENDA) tablet 10 mg  10 mg Oral Q12H    QUEtiapine (SEROquel) tablet 12.5 mg  12.5 mg Oral QHS    risperiDONE (RisperDAL) tablet 0.25 mg  0.25 mg Oral ACL    0.9% sodium chloride infusion  75 mL/hr IntraVENous CONTINUOUS    sodium chloride (NS) flush 5-40 mL  5-40 mL IntraVENous Q8H    sodium chloride (NS) flush 5-40 mL  5-40 mL IntraVENous PRN    naloxone (NARCAN) injection 0.4 mg  0.4 mg IntraVENous PRN     ______________________________________________________________________  EXPECTED LENGTH OF STAY: - - -  ACTUAL LENGTH OF STAY:          2                 Bellwood Danger, NP

## 2020-11-11 NOTE — PROGRESS NOTES
Problem: General Medical Care Plan  Goal: *Vital signs within specified parameters  Outcome: Progressing Towards Goal  Goal: *Labs within defined limits  Outcome: Progressing Towards Goal  Goal: *Absence of infection signs and symptoms  Outcome: Progressing Towards Goal  Goal: *Optimal pain control at patient's stated goal  Outcome: Progressing Towards Goal  Goal: *Skin integrity maintained  Outcome: Progressing Towards Goal  Goal: *Fluid volume balance  Outcome: Progressing Towards Goal  Goal: *Optimize nutritional status  Outcome: Progressing Towards Goal  Goal: *Anxiety reduced or absent  Outcome: Progressing Towards Goal  Goal: *Progressive mobility and function (eg: ADL's)  Outcome: Progressing Towards Goal     Problem: Patient Education: Go to Patient Education Activity  Goal: Patient/Family Education  Outcome: Progressing Towards Goal     Problem: Falls - Risk of  Goal: *Absence of Falls  Description: Document Sasha Fall Risk and appropriate interventions in the flowsheet.   Outcome: Progressing Towards Goal  Note: Fall Risk Interventions:  Mobility Interventions: Bed/chair exit alarm, Patient to call before getting OOB, Strengthening exercises (ROM-active/passive), Utilize walker, cane, or other assistive device, PT Consult for mobility concerns, PT Consult for assist device competence    Mentation Interventions: Bed/chair exit alarm, Adequate sleep, hydration, pain control, More frequent rounding, Family/sitter at bedside, Update white board    Medication Interventions: Bed/chair exit alarm, Evaluate medications/consider consulting pharmacy, Patient to call before getting OOB, Teach patient to arise slowly, Utilize gait belt for transfers/ambulation    Elimination Interventions: Call light in reach, Patient to call for help with toileting needs    History of Falls Interventions: Bed/chair exit alarm, Door open when patient unattended, Evaluate medications/consider consulting pharmacy, Investigate reason for fall         Problem: Patient Education: Go to Patient Education Activity  Goal: Patient/Family Education  Outcome: Progressing Towards Goal     Problem: Patient Education: Go to Patient Education Activity  Goal: Patient/Family Education  Outcome: Progressing Towards Goal     Problem: Hip Fracture: Day of Surgery Post-op Care  Goal: Activity/Safety  Outcome: Progressing Towards Goal  Goal: Consults, if ordered  Outcome: Progressing Towards Goal  Goal: Diagnostic Test/Procedures  Outcome: Progressing Towards Goal  Goal: Nutrition/Diet  Outcome: Progressing Towards Goal  Goal: Medications  Outcome: Progressing Towards Goal  Goal: Respiratory  Outcome: Progressing Towards Goal  Goal: Treatments/Interventions/Procedures  Outcome: Progressing Towards Goal  Goal: Psychosocial  Outcome: Progressing Towards Goal  Goal: *Absence of skin breakdown  Outcome: Progressing Towards Goal  Goal: *Optimal pain control at patient's stated goal  Outcome: Progressing Towards Goal  Goal: *Hemodynamically stable  Outcome: Progressing Towards Goal     Problem: Pressure Injury - Risk of  Goal: *Prevention of pressure injury  Description: Document Humberto Scale and appropriate interventions in the flowsheet. Outcome: Progressing Towards Goal  Note: Pressure Injury Interventions:  Sensory Interventions: Assess changes in LOC, Check visual cues for pain, Float heels, Keep linens dry and wrinkle-free, Maintain/enhance activity level, Minimize linen layers, Turn and reposition approx. every two hours (pillows and wedges if needed)    Moisture Interventions: Absorbent underpads, Internal/External urinary devices    Activity Interventions: Pressure redistribution bed/mattress(bed type)    Mobility Interventions: HOB 30 degrees or less, PT/OT evaluation, Pressure redistribution bed/mattress (bed type), Float heels, Turn and reposition approx.  every two hours(pillow and wedges)    Nutrition Interventions: Document food/fluid/supplement intake, Offer support with meals,snacks and hydration                     Problem: Patient Education: Go to Patient Education Activity  Goal: Patient/Family Education  Outcome: Progressing Towards Goal     Problem: Hypertension  Goal: *Blood pressure within specified parameters  Outcome: Progressing Towards Goal  Goal: *Fluid volume balance  Outcome: Progressing Towards Goal  Goal: *Labs within defined limits  Outcome: Progressing Towards Goal     Problem: Patient Education: Go to Patient Education Activity  Goal: Patient/Family Education  Outcome: Progressing Towards Goal

## 2020-11-11 NOTE — PROGRESS NOTES
Occupational Therapy  Orders received, chart reviewed and patient evaluated by occupational therapy. Pending progression with skilled acute occupational therapy, recommend:  Therapy up to 5 days/week in SNF setting     Recommend with nursing patient to complete as able in order to maintain strength, endurance and independence: bed in chair for meals andbed pan or Purewick use for bathroom. Thank you for your assistance. Full evaluation to follow.            Ainsley Jara, OT

## 2020-11-11 NOTE — PROGRESS NOTES
Bedside shift change report given to Tavon Mcarthur (oncoming nurse) by James Zimmer (offgoing nurse). Report included the following information SBAR, Kardex, Intake/Output, MAR and Recent Results.

## 2020-11-11 NOTE — PROGRESS NOTES
Problem: Self Care Deficits Care Plan (Adult)  Goal: *Acute Goals and Plan of Care (Insert Text)  Description:   FUNCTIONAL STATUS PRIOR TO ADMISSION: Patient unable to verbalize history. Per chart review she lives in [address] [place] [address] at [address] where she was indep with ambulation. Per assessment eval she demonstrates physical abilities to participate in ADL tasks. Hx of dementia. HOME SUPPORT: Memory Care Unit at [facility]. Occupational Therapy Goals  Initiated 11/11/2020  1. Patient will perform 2 grooming tasks sitting at EOB with supervision/set-up within 7 day(s). 2.  Patient will perform lower body dressing with moderate assistance  within 7 day(s). 3.  Patient will perform UB bathing in sitting with supervision/set-up within 7 day(s). 4.  Patient will perform toilet transfers at [facility] level with moderate assistance within 7 day(s). 5.  Patient will perform all aspects of toileting with moderate assistance  within 7 day(s). Outcome: Progressing Towards Goal   OCCUPATIONAL THERAPY EVALUATION  Patient: Betty Junior (19 y.o. female)  Date: 11/11/2020  Primary Diagnosis: Femur fracture (HCC) [S72.90XA]  Femoral neck fracture (HCC) [S72.009A]  Procedure(s) (LRB):  RIGHT HIP HEMIARTHROPLASTY  WANTS BRANDO TABLE  ESSENTIAL  DEPUY AWARE  AVAILABLE AT 0800 (Right) 1 Day Post-Op   Precautions:  Fall, WBAT    ASSESSMENT  Based on the objective data described below, the patient presents with impaired functional mobility, activity tolerance, standing balance and confusion necessary in ADL tasks s/p fall with R femur fx with R hip hemiarthoplasty POD 1. She is oriented to self only. Pleasantly confused, able to follow basic one step command and complete functional tasks. She was able to sit EOB and wash her face with SBA. Completed sit > stand with mod A at RW level but unable to advance either LE. Returned to bed and placed bed in chair position for lunch.       Patient is aware of R hip \"being tighter\" or \"not working\" as well as her left but not able to verbalize GLF, R femur fx or surgery. Recommend SNF for additional skilled OT services prior to return to memory care. Current Level of Function Impacting Discharge (ADLs/self-care): Self feeding set up, LB care total A, toileting total A    Functional Outcome Measure: The patient scored 5/100 on the Barthel Index outcome measure which is indicative of severe impairment with ADL tasks and functional mobility. Other factors to consider for discharge: Patient with hx of dementia, lives in memory care unit. She with impaired STM limiting progression however good rehab potential secondary to basic command follow and high level of ambulation PTA. Patient will benefit from skilled therapy intervention to address the above noted impairments. PLAN :  Recommendations and Planned Interventions: self care training, functional mobility training, therapeutic exercise, balance training, therapeutic activities, endurance activities, patient education, home safety training, and family training/education    Frequency/Duration: Patient will be followed by occupational therapy 5 times a week to address goals. Recommendation for discharge: (in order for the patient to meet his/her long term goals)  Therapy up to 5 days/week in SNF setting    This discharge recommendation:  Has been made in collaboration with the attending provider and/or case management    IF patient discharges home will need the following DME: TBD SNF       SUBJECTIVE:   Patient stated This one is a little tight.     OBJECTIVE DATA SUMMARY:   HISTORY:   Past Medical History:   Diagnosis Date    DJD (degenerative joint disease)     Hematuria, microscopic     Hypertension     Memory loss     Menopause     Osteopenia      Past Surgical History:   Procedure Laterality Date    HX HYSTERECTOMY         Expanded or extensive additional review of patient history:     Home Situation  Home Environment: (memory center- [facility])  Living Alone: No  Support Systems: Assisted living    EXAMINATION OF PERFORMANCE DEFICITS:  Cognitive/Behavioral Status:  Neurologic State: Alert; Appropriate for age       Hearing: Auditory  Auditory Impairment: Hard of hearing, bilateral    Vision/Perceptual:    Tracking: (unable to assess secondary to confusion)                                Range of Motion:  AROM: Generally decreased, functional(limited ability to follow verbal commands)                         Strength:  Strength: Generally decreased, functional(limited ability to follow commands, R hip limited by pain)                Coordination:  Coordination: Generally decreased, functional  Fine Motor Skills-Upper: Left Intact; Right Intact    Gross Motor Skills-Upper: Left Intact; Right Intact    Tone & Sensation:  Tone: Normal  Sensation: Intact       Balance:  Sitting: Intact; Without support  Standing: Impaired; With support  Standing - Static: Constant support;Fair(tends to lean posteriorly slightly with RW)  Standing - Dynamic : Constant support;Poor(unable to take a step)    Functional Mobility and Transfers for ADLs:  Bed Mobility:  Supine to Sit: Moderate assistance; Additional time;Bed Modified(HOB elevated)  Sit to Supine: Moderate assistance; Additional time    Transfers:  Sit to Stand: Moderate assistance; Adaptive equipment; Additional time  Stand to Sit: Minimum assistance; Adaptive equipment; Additional time  Bed to Chair: (unable to advance)    ADL Assessment:  Feeding: Setup  Oral Facial Hygiene/Grooming: Stand-by assistance  Bathing: Maximum assistance  Upper Body Dressing: Minimum assistance  Lower Body Dressing: Total assistance  Toileting: Total assistance                ADL Intervention and task modifications:   She completed supine <> sit with mod A, sitting EOB with SBA without UE support and completed sit<> stand with mod A at RW level.   While at [facility], she was able to complete grooming tasks with supervision, no LOB. She was unable to advance either LE for side stepping or transfers. Returned to bed with mod A. While in bed, placed in chair position for feeding. Feeding with set up. Self feeding: set up  Washing face: EOB SBA       Functional Measure:  Barthel Index:    Bathin  Bladder: 0  Bowels: 0  Groomin  Dressin  Feedin  Mobility: 0  Stairs: 0  Toilet Use: 0  Transfer (Bed to Chair and Back): 0  Total: 5/100        The Barthel ADL Index: Guidelines  1. The index should be used as a record of what a patient does, not as a record of what a patient could do. 2. The main aim is to establish degree of independence from any help, physical or verbal, however minor and for whatever reason. 3. The need for supervision renders the patient not independent. 4. A patient's performance should be established using the best available evidence. Asking the patient, friends/relatives and nurses are the usual sources, but direct observation and common sense are also important. However direct testing is not needed. 5. Usually the patient's performance over the preceding 24-48 hours is important, but occasionally longer periods will be relevant. 6. Middle categories imply that the patient supplies over 50 per cent of the effort. 7. Use of aids to be independent is allowed. Lambert Gusman., Barthel, D.W. (3235). Functional evaluation: the Barthel Index. [facility] (14)2. CLAUDIA Angulo Ladean Fellows., [facility], [address] [place] [address] (). Measuring the change indisability after inpatient rehabilitation; comparison of the responsiveness of the Barthel Index and Functional Cobb Measure. Journal of Neurology, Neurosurgery, and Psychiatry, 66(4), 491-442. Murphy Driver NMERY.A, GERALD Melendez.M, & Eva Alexandra, M.A. (2004.) Assessment of post-stroke quality of life in cost-effectiveness studies: The usefulness of the Barthel Index and the EuroQoL-5D. Quality of Life Research, 15, 370-95       Occupational Therapy Evaluation Charge Determination   History Examination Decision-Making   MEDIUM Complexity : Expanded review of history including physical, cognitive and psychosocial  history  MEDIUM Complexity : 3-5 performance deficits relating to physical, cognitive , or psychosocial skils that result in activity limitations and / or participation restrictions MEDIUM Complexity : Patient may present with comorbidities that affect occupational performnce. Miniml to moderate modification of tasks or assistance (eg, physical or verbal ) with assesment(s) is necessary to enable patient to complete evaluation       Based on the above components, the patient evaluation is determined to be of the following complexity level: MEDIUM  Pain Rating:  No direct c/o pain-  reports R hip with \"tightness\"  difficulty with verbalizing secondary to dementia. Activity Tolerance:   Fair    After treatment patient left in no apparent distress:    Call bell within reach and bed in chair position, chair alarm    COMMUNICATION/EDUCATION:   The patients plan of care was discussed with: Physical therapist and Registered nurse. Home safety education was provided and the patient/caregiver indicated understanding. and Patient/family agree to work toward stated goals and plan of care. This patients plan of care is appropriate for delegation to [facility].     Thank you for this referral.  Elena Stover, OT  Time Calculation: 18 mins

## 2020-11-11 NOTE — PROGRESS NOTES
Ortho Daily Progress Note      Patient: Jaimee Amador                   MRN: [id number]  Sex: female  YOB: 1925           Age: 80 y.o.    1 Day Post-Op    Procedure(s): RIGHT HIP HEMIARTHROPLASTY     Subjective: Confused, responds to questions but answers don't make sense     Visit Vitals  /78   Pulse 86   Temp 97.6 °F (36.4 °C)   Resp 16   Wt 57.7 kg (127 lb 3.3 oz)   SpO2 97%   BMI 24.04 kg/m²        Lab Results:  HGB   Date/Time Value Ref Range Status   11/11/2020 05:14 AM 14.7 11.5 - 16.0 g/dL Final     INR   Date/Time Value Ref Range Status   11/09/2020 03:49 AM 1.0 0.9 - 1.1   Final     Comment:     A single therapeutic range for Vit K antagonists may not be optimal for all indications - see June, 2008 issue of Chest, [employer] Evidence-Based Clinical Practice Guidelines, 8th Edition.        Physical Exam:    GENERAL: alert, cooperative, not oriented, confused  DRESSING: Aquacel dressing right hip c/d/i  SWELLING: mild  NEUROLOGICAL: intact  PULSE:yes   MOTION: no pain with gentle ROM right hip  DVT Exam: no evidence of DVT seen on physical exam.      Plan:    DVT prophylaxis ASA 81mg po bid  Weight bearing restrictionWBAT  Pain Control:stable, mild-to-moderate joint symptoms intermittently, reasonably well controlled by current meds  Dispo: return to [address] [place] [address]  11/11/2020   9:47 AM      .

## 2020-11-11 NOTE — PROGRESS NOTES
Problem: Mobility Impaired (Adult and Pediatric)  Goal: *Acute Goals and Plan of Care (Insert Text)  Description: FUNCTIONAL STATUS PRIOR TO ADMISSION: Patient was in memory care prior to admission and was ambulatory without an assistive device per ortho note. HOME SUPPORT PRIOR TO ADMISSION: Patient lived in a nursing facility. Physical Therapy Goals  Initiated 11/11/2020  1. Patient will move from supine to sit and sit to supine  and roll side to side in bed with minimal assistance/contact guard assist within 7 day(s). 2.  Patient will transfer from bed to chair and chair to bed with minimal assistance/contact guard assist using the least restrictive device within 7 day(s). 3.  Patient will perform sit to stand with minimal assistance/contact guard assist within 7 day(s). 4.  Patient will ambulate with minimal assistance/contact guard assist for 25 feet with the least restrictive device within 7 day(s). 11/11/2020 1041 by Laverna Hamman, PT  Outcome: Progressing Towards Goal   PHYSICAL THERAPY TREATMENT  Patient: Kurt Bae (37 y.o. female)  Date: 11/11/2020  Diagnosis: Femur fracture ([facility]) [S72.90XA]  Femoral neck fracture ([address] [S72.009A]   <principal problem not specified>  Procedure(s) (LRB):  RIGHT HIP HEMIARTHROPLASTY  WANTS [place] TABLE  ESSENTIAL  DEPUY AWARE  AVAILABLE AT 0800 (Right) 1 Day Post-Op  Precautions: Fall, WBAT  Chart, physical therapy assessment, plan of care and goals were reviewed. ASSESSMENT  Patient continues with skilled PT services and is progressing towards goals. She was able to tolerate sitting EOB with good pain control. She was able to stand with the walker with mod assist briefly, but not able to take a step with the walker. Expect that she will be able to transfer OOB to chair tomorrow if she continues to progress well. Continue to recommend SNF level rehab once medically ready.      Current Level of Function Impacting Discharge (mobility/balance): mod assist with bed mobility, not OOB yet    Other factors to consider for discharge: from memory care unit         PLAN :  Patient continues to benefit from skilled intervention to address the above impairments. Continue treatment per established plan of care. to address goals. Recommendation for discharge: (in order for the patient to meet his/her long term goals)  Therapy up to 5 days/week in SNF setting    This discharge recommendation:  Has not yet been discussed the attending provider and/or case management    IF patient discharges home will need the following DME: to be determined (TBD)       SUBJECTIVE:   Patient stated This one is heavy.  (indicating RLE)    OBJECTIVE DATA SUMMARY:   Critical Behavior:  Neurologic State: Alert, Confused  Orientation Level: Oriented to person  Cognition: Memory loss     Functional Mobility Training:  Bed Mobility:     Supine to Sit: Moderate assistance; Additional time;Bed Modified(HOB elevated)  Sit to Supine: Moderate assistance; Additional time           Transfers:  Sit to Stand: Moderate assistance; Adaptive equipment; Additional time  Stand to Sit: Minimum assistance; Adaptive equipment; Additional time           Lateral Transfers: Moderate assistance(lateral scoot in sitting EOB)                 Balance:  Sitting: Intact; Without support  Standing: Impaired; With support  Standing - Static: Constant support;Fair(tends to lean posteriorly slightly with RW)  Standing - Dynamic : Constant support;Poor(unable to take a step)  Ambulation/Gait Training:                    Right Side Weight Bearing: As tolerated  Left Side Weight Bearing: Full                                Stairs:               Therapeutic Exercises:     Pain Rating:  Minimal pain initially with sitting    Activity Tolerance:   Good    After treatment patient left in no apparent distress:   Call bell within reach, Bed / chair alarm activated, Side rails x 3, and bed in chair position    COMMUNICATION/COLLABORATION:   The patients plan of care was discussed with: Occupational therapist and Registered nurse.      Shraddha Lau, PT   Time Calculation: 18 mins

## 2020-11-12 LAB
ANION GAP SERPL CALC-SCNC: 7 MMOL/L (ref 5–15)
BASOPHILS # BLD: 0.1 K/UL (ref 0–0.1)
BASOPHILS NFR BLD: 1 % (ref 0–1)
BUN SERPL-MCNC: 19 MG/DL (ref 6–20)
BUN/CREAT SERPL: 29 (ref 12–20)
CALCIUM SERPL-MCNC: 8.6 MG/DL (ref 8.5–10.1)
CHLORIDE SERPL-SCNC: 110 MMOL/L (ref 97–108)
CO2 SERPL-SCNC: 24 MMOL/L (ref 21–32)
CREAT SERPL-MCNC: 0.65 MG/DL (ref 0.55–1.02)
DIFFERENTIAL METHOD BLD: ABNORMAL
EOSINOPHIL # BLD: 0.1 K/UL (ref 0–0.4)
EOSINOPHIL NFR BLD: 1 % (ref 0–7)
ERYTHROCYTE [DISTWIDTH] IN BLOOD BY AUTOMATED COUNT: 14 % (ref 11.5–14.5)
GLUCOSE SERPL-MCNC: 114 MG/DL (ref 65–100)
HCT VFR BLD AUTO: 42.6 % (ref 35–47)
HEALTH STATUS, XMCV2T: NORMAL
HGB BLD-MCNC: 14 G/DL (ref 11.5–16)
IMM GRANULOCYTES # BLD AUTO: 0.1 K/UL (ref 0–0.04)
IMM GRANULOCYTES NFR BLD AUTO: 1 % (ref 0–0.5)
LYMPHOCYTES # BLD: 0.8 K/UL (ref 0.8–3.5)
LYMPHOCYTES NFR BLD: 6 % (ref 12–49)
MCH RBC QN AUTO: 30.4 PG (ref 26–34)
MCHC RBC AUTO-ENTMCNC: 32.9 G/DL (ref 30–36.5)
MCV RBC AUTO: 92.6 FL (ref 80–99)
MONOCYTES # BLD: 1.3 K/UL (ref 0–1)
MONOCYTES NFR BLD: 10 % (ref 5–13)
NEUTS SEG # BLD: 10.6 K/UL (ref 1.8–8)
NEUTS SEG NFR BLD: 81 % (ref 32–75)
NRBC # BLD: 0 K/UL (ref 0–0.01)
NRBC BLD-RTO: 0 PER 100 WBC
PLATELET # BLD AUTO: 209 K/UL (ref 150–400)
PMV BLD AUTO: 10 FL (ref 8.9–12.9)
POTASSIUM SERPL-SCNC: 3.4 MMOL/L (ref 3.5–5.1)
RBC # BLD AUTO: 4.6 M/UL (ref 3.8–5.2)
SARS-COV-2, COV2: NOT DETECTED
SODIUM SERPL-SCNC: 141 MMOL/L (ref 136–145)
SOURCE, COVRS: NORMAL
SPECIMEN SOURCE, FCOV2M: NORMAL
SPECIMEN TYPE, XMCV1T: NORMAL
WBC # BLD AUTO: 13 K/UL (ref 3.6–11)

## 2020-11-12 PROCEDURE — 74011250637 HC RX REV CODE- 250/637: Performed by: PHYSICIAN ASSISTANT

## 2020-11-12 PROCEDURE — 65270000029 HC RM PRIVATE

## 2020-11-12 PROCEDURE — 97535 SELF CARE MNGMENT TRAINING: CPT

## 2020-11-12 PROCEDURE — 85025 COMPLETE CBC W/AUTO DIFF WBC: CPT

## 2020-11-12 PROCEDURE — 80048 BASIC METABOLIC PNL TOTAL CA: CPT

## 2020-11-12 PROCEDURE — 97530 THERAPEUTIC ACTIVITIES: CPT

## 2020-11-12 PROCEDURE — 36415 COLL VENOUS BLD VENIPUNCTURE: CPT

## 2020-11-12 PROCEDURE — 74011250637 HC RX REV CODE- 250/637: Performed by: NURSE PRACTITIONER

## 2020-11-12 RX ORDER — POTASSIUM CHLORIDE 750 MG/1
40 TABLET, FILM COATED, EXTENDED RELEASE ORAL
Status: DISCONTINUED | OUTPATIENT
Start: 2020-11-12 | End: 2020-11-12

## 2020-11-12 RX ADMIN — POLYETHYLENE GLYCOL 3350 17 G: 17 POWDER, FOR SOLUTION ORAL at 09:46

## 2020-11-12 RX ADMIN — MEMANTINE HYDROCHLORIDE 10 MG: 10 TABLET ORAL at 22:40

## 2020-11-12 RX ADMIN — MEMANTINE HYDROCHLORIDE 10 MG: 10 TABLET ORAL at 09:46

## 2020-11-12 RX ADMIN — BUSPIRONE HYDROCHLORIDE 10 MG: 10 TABLET ORAL at 09:45

## 2020-11-12 RX ADMIN — Medication 81 MG: at 09:46

## 2020-11-12 RX ADMIN — OXYCODONE HYDROCHLORIDE 5 MG: 5 TABLET ORAL at 03:00

## 2020-11-12 RX ADMIN — ACETAMINOPHEN 650 MG: 325 TABLET ORAL at 14:19

## 2020-11-12 RX ADMIN — QUETIAPINE FUMARATE 12.5 MG: 25 TABLET ORAL at 22:40

## 2020-11-12 RX ADMIN — Medication 81 MG: at 22:40

## 2020-11-12 RX ADMIN — AMLODIPINE BESYLATE 2.5 MG: 5 TABLET ORAL at 09:45

## 2020-11-12 RX ADMIN — FAMOTIDINE 20 MG: 20 TABLET ORAL at 09:46

## 2020-11-12 RX ADMIN — ACETAMINOPHEN 650 MG: 325 TABLET ORAL at 19:00

## 2020-11-12 RX ADMIN — DOCUSATE SODIUM 50 MG AND SENNOSIDES 8.6 MG 1 TABLET: 8.6; 5 TABLET, FILM COATED ORAL at 09:46

## 2020-11-12 RX ADMIN — POTASSIUM BICARBONATE 40 MEQ: 782 TABLET, EFFERVESCENT ORAL at 10:42

## 2020-11-12 RX ADMIN — RISPERIDONE 0.25 MG: 0.5 TABLET ORAL at 10:41

## 2020-11-12 RX ADMIN — BUSPIRONE HYDROCHLORIDE 10 MG: 10 TABLET ORAL at 16:09

## 2020-11-12 RX ADMIN — DOCUSATE SODIUM 50 MG AND SENNOSIDES 8.6 MG 1 TABLET: 8.6; 5 TABLET, FILM COATED ORAL at 18:55

## 2020-11-12 RX ADMIN — BUSPIRONE HYDROCHLORIDE 10 MG: 10 TABLET ORAL at 23:06

## 2020-11-12 RX ADMIN — FAMOTIDINE 20 MG: 20 TABLET ORAL at 18:55

## 2020-11-12 NOTE — PROGRESS NOTES
Sent perfect serve to Chelsey Hoyos NP to change potassium to Effer K. Potassium tabs are extended release & cannot be crushed.   Patient needs to have meds crushed

## 2020-11-12 NOTE — PROGRESS NOTES
Problem: Falls - Risk of  Goal: *Absence of Falls  Description: Document Noble Stephens Fall Risk and appropriate interventions in the flowsheet.   Outcome: Progressing Towards Goal  Note: Fall Risk Interventions:  Mobility Interventions: Patient to call before getting OOB, Bed/chair exit alarm, Communicate number of staff needed for ambulation/transfer, PT Consult for mobility concerns    Mentation Interventions: Adequate sleep, hydration, pain control, Bed/chair exit alarm, Door open when patient unattended, More frequent rounding, Reorient patient, Room close to nurse's station, Toileting rounds    Medication Interventions: Bed/chair exit alarm, Evaluate medications/consider consulting pharmacy, Patient to call before getting OOB, Teach patient to arise slowly, Utilize gait belt for transfers/ambulation    Elimination Interventions: Call light in reach, Toileting schedule/hourly rounds    History of Falls Interventions: Bed/chair exit alarm, Consult care management for discharge planning, Door open when patient unattended, Evaluate medications/consider consulting pharmacy, Investigate reason for fall, Room close to nurse's station, Utilize gait belt for transfer/ambulation         Problem: Hip Fracture: Post-Op Day 2  Goal: Activity/Safety  Outcome: Progressing Towards Goal  Note: Patient to work with PT for ambulation  Call bell within reach  Patient to call for assistance     Problem: Hip Fracture: Post-Op Day 2  Goal: Activity/Safety  Outcome: Progressing Towards Goal  Note: Patient to work with PT for ambulation  Call bell within reach  Patient to call for assistance

## 2020-11-12 NOTE — PROGRESS NOTES
Bedside shift change report given to [address] [place] [address] Barbara Alcala (oncoming nurse) by Leda Chow (offgoing nurse). Report included the following information SBAR, Kardex, Intake/Output, MAR and Recent Results.

## 2020-11-12 NOTE — PROGRESS NOTES
Problem: Falls - Risk of  Goal: *Absence of Falls  Description: Document Linsey Gill Fall Risk and appropriate interventions in the flowsheet.   Outcome: Progressing Towards Goal  Note: Fall Risk Interventions:  Mobility Interventions: Patient to call before getting OOB, Bed/chair exit alarm, Communicate number of staff needed for ambulation/transfer, PT Consult for mobility concerns    Mentation Interventions: Adequate sleep, hydration, pain control, Bed/chair exit alarm, Door open when patient unattended, More frequent rounding, Reorient patient, Room close to nurse's station, Toileting rounds    Medication Interventions: Bed/chair exit alarm, Evaluate medications/consider consulting pharmacy, Patient to call before getting OOB, Teach patient to arise slowly, Utilize gait belt for transfers/ambulation    Elimination Interventions: Call light in reach, Toileting schedule/hourly rounds    History of Falls Interventions: Bed/chair exit alarm, Consult care management for discharge planning, Door open when patient unattended, Evaluate medications/consider consulting pharmacy, Investigate reason for fall, Room close to nurse's station, Utilize gait belt for transfer/ambulation         Problem: Hip Fracture: Post-Op Day 2  Goal: Activity/Safety  Outcome: Progressing Towards Goal  Note: Patient to work with PT for ambulation  Call bell within reach  Patient to call for assistance

## 2020-11-12 NOTE — PROGRESS NOTES
Problem: Mobility Impaired (Adult and Pediatric)  Goal: *Acute Goals and Plan of Care (Insert Text)  Description: FUNCTIONAL STATUS PRIOR TO ADMISSION: Patient was in memory care prior to admission and was ambulatory without an assistive device per ortho note. HOME SUPPORT PRIOR TO ADMISSION: Patient lived in a nursing facility. Physical Therapy Goals  Initiated 11/11/2020  1. Patient will move from supine to sit and sit to supine  and roll side to side in bed with minimal assistance/contact guard assist within 7 day(s). 2.  Patient will transfer from bed to chair and chair to bed with minimal assistance/contact guard assist using the least restrictive device within 7 day(s). 3.  Patient will perform sit to stand with minimal assistance/contact guard assist within 7 day(s). 4.  Patient will ambulate with minimal assistance/contact guard assist for 25 feet with the least restrictive device within 7 day(s). Outcome: Progressing Towards Goal   PHYSICAL THERAPY TREATMENT  Patient: Heidi Hidalgo (55 y.o. female)  Date: 11/12/2020  Diagnosis: Femur fracture ([facility]) [S72.90XA]  Femoral neck fracture ([address] [S72.009A]   <principal problem not specified>  Procedure(s) (LRB):  RIGHT HIP HEMIARTHROPLASTY  WANTS [place] TABLE  ESSENTIAL  DEPUY AWARE  AVAILABLE AT 0800 (Right) 2 Days Post-Op  Precautions: Fall, WBAT  Chart, physical therapy assessment, plan of care and goals were reviewed. ASSESSMENT  Patient continues with skilled PT services and is progressing towards goals. She was alert and eager to mobilize. She denied any pain in the L hip even with basic mobility, but note that she is reluctant to bear weight on the RLE even with the support of the walker. Once standing, she was able to maintain standing long enough to change her brief and was even able to let go of the walker with one hand briefly.   She then took several steps to the chair with the walker, needing moderate assist to manage the walker and maintain balance. Expect that she will progress slowly given her cognitive status and recommend SNF level rehab in her long term care facility. Addendum 13:23:  Patient transferred back to the bed with mod assist using the RW and assist of 2 for safety. She had difficulty understanding how to turn with the walker, but she was able to ambulate around the bed to the other side with better understanding. Recommend assist of 2 or all transfers for safety. Current Level of Function Impacting Discharge (mobility/balance): mod assist for mobility with the walker    Other factors to consider for discharge: dementia         PLAN :  Patient continues to benefit from skilled intervention to address the above impairments. Continue treatment per established plan of care. to address goals. Recommendation for discharge: (in order for the patient to meet his/her long term goals)  Therapy up to 5 days/week in SNF setting    This discharge recommendation:  Has been made in collaboration with the attending provider and/or case management    IF patient discharges home will need the following DME: to be determined (TBD)       SUBJECTIVE:   Patient stated You are the most wonderful thing in the world.     OBJECTIVE DATA SUMMARY:   Critical Behavior:  Neurologic State: Alert  Orientation Level: Oriented to person  Cognition: Memory loss, Decreased command following, Impaired decision making, Poor safety awareness     Functional Mobility Training:  Bed Mobility:     Supine to Sit: Moderate assistance; Additional time;Bed Modified(HOB elevated)  Sit to Supine: Moderate assistance(for LEs)           Transfers:  Sit to Stand: Moderate assistance; Adaptive equipment; Additional time  Stand to Sit: Minimum assistance; Adaptive equipment; Additional time        Bed to Chair: Moderate assistance; Adaptive equipment; Additional time                    Balance:  Sitting: Intact; Without support  Standing: Impaired; With support(with RW)  Standing - Static: Constant support; Fair  Standing - Dynamic : Constant support;Poor  Ambulation/Gait Training:  Distance (ft): 12 Feet (ft)  Assistive Device: Gait belt;Walker, rolling  Ambulation - Level of Assistance: Moderate assistance; Adaptive equipment; Additional time        Gait Abnormalities: Antalgic;Decreased step clearance; Step to gait; Shuffling gait;Trunk sway increased  Right Side Weight Bearing: As tolerated  Left Side Weight Bearing: Full  Base of Support: Widened;Shift to left  Stance: Right decreased  Speed/Celia: Slow;Shuffled  Step Length: Right shortened;Left shortened  Swing Pattern: Right asymmetrical     Interventions: Safety awareness training; Tactile cues; Verbal cues; Visual/Demos           Stairs: Therapeutic Exercises:     Pain Rating:  No pain reported    Activity Tolerance:   Fair    After treatment patient left in no apparent distress:   Sitting in chair, Call bell within reach, and Bed / chair alarm activated    COMMUNICATION/COLLABORATION:   The patients plan of care was discussed with: Occupational therapist, Registered nurse, Case management, and PA .      Micaela Clarke, PT   Time Calculation: 9 mins

## 2020-11-12 NOTE — PROGRESS NOTES
Bedside and Verbal shift change report given to Neelam Cleveland RN (oncoming nurse) by Rivas Yeung RN (offgoing nurse). Report included the following information SBAR, Intake/Output, MAR and Recent Results.

## 2020-11-12 NOTE — PROGRESS NOTES
[address] [facility] Adult  Hospitalist Group                                                                                          Hospitalist Progress Note  Mook Godinez NP  Answering service: [phone] OR [phone] from in house phone        Date of Service:  2020  NAME:  Amy Bobby  :  1925  MRN:  [id number]      Admission Summary:   Per H&P: Shantell Morales is a 89 y. o. female who presents with fall  History is primary obtained from the patient  Patient presented to the ER from our [facility] with fall. Oren Rivera was being assisted when she had a fall. Oren Peng came to the ER with apparent hip pain, was found to have a femoral fracture and was requested to be admitted to the hospitalist service. Oren Peng has dementia, peers to be in significant pain, but no history could be obtained from the patient.  I did speak to the patient's son Chung Acosta who reports that patient has dementia, is not able to hold a conversation, and is a DNR.  No further history can be obtained from the patient or from the family       Interval history / Subjective:     Seen and examined patient sitting up in bed. She is awake, alert, and oriented x1  POD #3 for right hip hemiarthroplasty. Left hip dressing is clean, dry and intact. States that she is feeling good and denies pain. No acute distress noted. No over night events noted.       Assessment & Plan:     Right femoral fracture due to fall   - s/p right hip hemiarthroplasty (11/10)   - CT head pending    - Pain control   - PT/ OT following      Hypertension   - Stable   - Monitor blood pressure   - Continue amlodipine     Hypokalemia   - Potassium 3.4  - Replaced  - Monitor labs         Dehydration   - Continue IV fluids   - Monitor labs   - Encourage PO intake      Dementia   - Fall precautions   - Supportive care   - Continue Buspar, Memantine, Quetiapine, Riperidone      Code status: Full   DVT prophylaxis: SCDs    Care Plan discussed with: Patient/Family, Nurse and   Anticipated Disposition: SNF/LTC  Anticipated Discharge: Less than 24 hours   Accepted at [address] [facility] result. Hospital Problems  Date Reviewed: 11/10/2020          Codes Class Noted POA    Femoral neck fracture ([facility] ICD-10-CM: V59.461T  ICD-9-CM: 820.8  11/10/2020 Unknown        Femur fracture ([facility] ICD-10-CM: S07.59BN  ICD-9-CM: 821.00  11/9/2020 Unknown                Review of Systems:   A comprehensive review of systems was negative except for that written in the HPI. Vital Signs:    Last 24hrs VS reviewed since prior progress note. Most recent are:  Visit Vitals  /72 (BP 1 Location: Right arm, BP Patient Position: At rest)   Pulse (!) 111   Temp 98.2 °F (36.8 °C)   Resp 16   Wt 57.7 kg (127 lb 3.3 oz)   SpO2 97%   BMI 24.04 kg/m²       No intake or output data in the 24 hours ending 11/12/20 1512     Physical Examination:             Constitutional:  No acute distress, cooperative, pleasant   ENT:  Oral mucosa moist, oropharynx benign. Resp:  CTA bilaterally. No wheezing/rhonchi/rales. No accessory muscle use   CV:  Regular rhythm, normal rate, no murmurs, gallops, rubs    GI:  Soft, non distended, non tender. normoactive bowel sounds, no hepatosplenomegaly     Musculoskeletal:  No edema, warm, 2+ pulses throughout. Left hip dressing noted- CDI     Neurologic:  Moves all extremities.   AAOx1, CN II-XII reviewed     Psych:  Not anxious or agitated        Data Review:    Review and/or order of clinical lab test      Labs:     Recent Labs     11/12/20 0531 11/11/20 0514   WBC 13.0* 13.0*   HGB 14.0 14.7   HCT 42.6 45.0    182     Recent Labs     11/12/20 0531 11/11/20 0514 11/10/20  0359    140 138   K 3.4* 3.9 3.2*   * 111* 107   CO2 24 20* 24   BUN 19 17 17   CREA 0.65 0.64 0.65   * 93 113*   CA 8.6 8.8 8.6     Recent Labs     11/10/20  0359   ALT 18   AP 69   TBILI 0.8   TP 6.6   ALB 3.4*   GLOB 3.2     No results for input(s): INR, PTP, APTT, INREXT in the last 72 hours. No results for input(s): FE, TIBC, PSAT, FERR in the last 72 hours. No results found for: FOL, RBCF   No results for input(s): PH, PCO2, PO2 in the last 72 hours. No results for input(s): CPK, CKNDX, TROIQ in the last 72 hours.     No lab exists for component: CPKMB  No results found for: CHOL, CHOLX, CHLST, CHOLV, HDL, HDLP, LDL, LDLC, DLDLP, TGLX, TRIGL, TRIGP, CHHD, CHHDX  Lab Results   Component Value Date/Time    Glucose (POC) 136 (H) 03/15/2011 06:52 PM     Lab Results   Component Value Date/Time    Color YELLOW/STRAW 11/09/2020 03:55 AM    Appearance CLEAR 11/09/2020 03:55 AM    Specific gravity 1.019 11/09/2020 03:55 AM    pH (UA) 7.0 11/09/2020 03:55 AM    Protein 30 (A) 11/09/2020 03:55 AM    Glucose Negative 11/09/2020 03:55 AM    Ketone 15 (A) 11/09/2020 03:55 AM    Bilirubin Negative 11/09/2020 03:55 AM    Urobilinogen 1.0 11/09/2020 03:55 AM    Nitrites Negative 11/09/2020 03:55 AM    Leukocyte Esterase SMALL (A) 11/09/2020 03:55 AM    Epithelial cells FEW 11/09/2020 03:55 AM    Bacteria Negative 11/09/2020 03:55 AM    WBC 0-4 11/09/2020 03:55 AM    RBC 0-5 11/09/2020 03:55 AM         Medications Reviewed:     Current Facility-Administered Medications   Medication Dose Route Frequency    morphine injection 2 mg  2 mg IntraVENous Q4H PRN    sodium chloride (NS) flush 5-40 mL  5-40 mL IntraVENous Q8H    sodium chloride (NS) flush 5-40 mL  5-40 mL IntraVENous PRN    acetaminophen (TYLENOL) tablet 650 mg  650 mg Oral Q6H    oxyCODONE IR (ROXICODONE) tablet 5 mg  5 mg Oral Q3H PRN    naloxone (NARCAN) injection 0.4 mg  0.4 mg IntraVENous PRN    hydrOXYzine HCL (ATARAX) tablet 10 mg  10 mg Oral Q8H PRN    famotidine (PEPCID) tablet 20 mg  20 mg Oral BID    senna-docusate (PERICOLACE) 8.6-50 mg per tablet 1 Tab  1 Tab Oral BID    polyethylene glycol (MIRALAX) packet 17 g  17 g Oral DAILY    bisacodyL (DULCOLAX) suppository 10 mg  10 mg Rectal DAILY PRN    aspirin delayed-release tablet 81 mg  81 mg Oral BID    amLODIPine (NORVASC) tablet 2.5 mg  2.5 mg Oral DAILY    busPIRone (BUSPAR) tablet 10 mg  10 mg Oral TID    memantine (NAMENDA) tablet 10 mg  10 mg Oral Q12H    QUEtiapine (SEROquel) tablet 12.5 mg  12.5 mg Oral QHS    risperiDONE (RisperDAL) tablet 0.25 mg  0.25 mg Oral ACL    0.9% sodium chloride infusion  75 mL/hr IntraVENous CONTINUOUS    sodium chloride (NS) flush 5-40 mL  5-40 mL IntraVENous Q8H    sodium chloride (NS) flush 5-40 mL  5-40 mL IntraVENous PRN    naloxone (NARCAN) injection 0.4 mg  0.4 mg IntraVENous PRN     ______________________________________________________________________  EXPECTED LENGTH OF STAY: - - -  ACTUAL LENGTH OF STAY:          3                 Sid Amezcua NP

## 2020-11-12 NOTE — PROGRESS NOTES
Problem: Self Care Deficits Care Plan (Adult)  Goal: *Acute Goals and Plan of Care (Insert Text)  Description:   FUNCTIONAL STATUS PRIOR TO ADMISSION: Patient unable to verbalize history. Per chart review she lives in [address] [place] [address] at [address] where she was indep with ambulation. Per assessment eval she demonstrates physical abilities to participate in ADL tasks. Hx of dementia. HOME SUPPORT: Memory Care Unit at [facility]. Occupational Therapy Goals  Initiated 11/11/2020  1. Patient will perform 2 grooming tasks sitting at EOB with supervision/set-up within 7 day(s). 2.  Patient will perform lower body dressing with moderate assistance  within 7 day(s). 3.  Patient will perform UB bathing in sitting with supervision/set-up within 7 day(s). 4.  Patient will perform toilet transfers at [facility] level with moderate assistance within 7 day(s). 5.  Patient will perform all aspects of toileting with moderate assistance  within 7 day(s). Outcome: Progressing Towards Goal    OCCUPATIONAL THERAPY TREATMENT  Patient: Serjio Pineda (21 y.o. female)  Date: 11/12/2020  Diagnosis: Femur fracture ([facility]) [S72.90XA]  Femoral neck fracture ([address] [S72.009A]   <principal problem not specified>  Procedure(s) (LRB):  RIGHT HIP HEMIARTHROPLASTY  WANTS HANA TABLE  ESSENTIAL  DEPUY AWARE  AVAILABLE AT 0800 (Right) 2 Days Post-Op  Precautions: Fall, WBAT  Chart, occupational therapy assessment, plan of care, and goals were reviewed. ASSESSMENT  Patient continues with skilled OT services and is progressing towards goals. Pt received with PT and progressing OOB in preparation for feeding. Pt incontinent of urine and requires total A for hygiene and clothing management. Pt progressed to chair and then provided with setup for eating. She preferred breakfast foods and was able to begin eating following complete setup for tasks. She requires containers to be opened and food to be cut her food.   Pt very pleasantly confused. Recommend discharge to SNF for continued rehab. Current Level of Function Impacting Discharge (ADLs): total A for basic ADL    Other factors to consider for discharge: debility, pain, cognitive status         PLAN :  Patient continues to benefit from skilled intervention to address the above impairments. Continue treatment per established plan of care. to address goals. Recommend with staff: OOB to chair TID for meals as able and/or bed in chair position for activities. Recommend next OT session: Grooming, bathing, dressing, toileting    Recommendation for discharge: (in order for the patient to meet his/her long term goals)  Therapy up to 5 days/week in SNF setting    This discharge recommendation:  Has been made in collaboration with the attending provider and/or case management    IF patient discharges home will need the following DME: none       SUBJECTIVE:   Patient stated I am so thankful you were here to help me.     OBJECTIVE DATA SUMMARY:   Cognitive/Behavioral Status:  Neurologic State: Alert  Orientation Level: Oriented to person;Disoriented to time;Disoriented to situation;Disoriented to place  Cognition: Decreased attention/concentration;Memory loss  Perception: Appears intact  Perseveration: Perseverates during conversation  Safety/Judgement: Decreased awareness of environment;Decreased awareness of need for assistance;Decreased awareness of need for safety;Decreased insight into deficits    Functional Mobility and Transfers for ADLs:  Bed Mobility:  Supine to Sit: Moderate assistance; Additional time;Bed Modified(HOB elevated)  Sit to Supine: Moderate assistance(for LEs)    Transfers:  Sit to Stand: Moderate assistance; Adaptive equipment; Additional time     Bed to Chair: Moderate assistance; Adaptive equipment; Additional time    Balance:  Sitting: Intact  Standing: Impaired; With support  Standing - Static: Constant support; Fair  Standing - Dynamic : Constant support; Fair    ADL Intervention:  Feeding  Feeding Assistance: Supervision(needs complete setup to initiate eating)  Container Management: Set-up  Cutting Food: Set-up  Utensil Management: Supervision  Food to Mouth: Supervision  Drink to Mouth: Supervision  Cues: Verbal cues provided                             Toileting  Toileting Assistance: Total assistance(dependent)(incontinent of urine)  Bladder Hygiene: Total assistance (dependent)  Clothing Management: Total assistance (dependent)  Cues: Verbal cues provided    Cognitive Retraining  Safety/Judgement: Decreased awareness of environment;Decreased awareness of need for assistance;Decreased awareness of need for safety;Decreased insight into deficits    Pain:  No pain    Activity Tolerance:   Good    After treatment patient left in no apparent distress:   Sitting in chair, Call bell within reach, and Bed / chair alarm activated    COMMUNICATION/COLLABORATION:   The patients plan of care was discussed with: Physical therapist and Registered nurse.      Hola Zacarias OT  Time Calculation: 34 mins

## 2020-11-12 NOTE — PROGRESS NOTES
Ortho Daily Progress Note      Patient: Heidi Hidalgo                   MRN: [id number]  Sex: female  YOB: 1925           Age: 80 y.o.    2 Days Post-Op    Procedure(s): RIGHT HIP HEMIARTHROPLASTY      Subjective: No complaints     Visit Vitals  /64 (BP 1 Location: Left arm, BP Patient Position: At rest)   Pulse 84   Temp 98.8 °F (37.1 °C)   Resp 16   Wt 57.7 kg (127 lb 3.3 oz)   SpO2 96%   BMI 24.04 kg/m²        Lab Results:  HGB   Date/Time Value Ref Range Status   11/12/2020 05:31 AM 14.0 11.5 - 16.0 g/dL Final     INR   Date/Time Value Ref Range Status   11/09/2020 03:49 AM 1.0 0.9 - 1.1   Final     Comment:     A single therapeutic range for Vit K antagonists may not be optimal for all indications - see June, 2008 issue of Chest, American College of Chest Physicians Evidence-Based Clinical Practice Guidelines, 8th Edition.        Physical Exam:    GENERAL:  confused, cooperative, no distress  DRESSING: clean/dry  SWELLING: mild  NEUROLOGICAL: intact  PULSE:yes   MOTION: no pain with gentle ROM right hip  DVT Exam: No evidence of DVT seen on physical exam.      Plan:    DVT prophylaxisAspirin  Weight bearing restrictionWBAT  Pain Control:stable, mild-to-moderate joint symptoms intermittently, reasonably well controlled by current meds  Dispo: Return to [address] [place] [address]  11/12/2020   12:04 PM      .

## 2020-11-13 VITALS
OXYGEN SATURATION: 97 % | HEART RATE: 94 BPM | RESPIRATION RATE: 17 BRPM | DIASTOLIC BLOOD PRESSURE: 68 MMHG | BODY MASS INDEX: 24.04 KG/M2 | SYSTOLIC BLOOD PRESSURE: 130 MMHG | WEIGHT: 127.21 LBS | TEMPERATURE: 98.3 F

## 2020-11-13 PROCEDURE — 77030019905 HC CATH URETH INTMIT MDII -A

## 2020-11-13 PROCEDURE — 51798 US URINE CAPACITY MEASURE: CPT

## 2020-11-13 PROCEDURE — 74011250637 HC RX REV CODE- 250/637: Performed by: PHYSICIAN ASSISTANT

## 2020-11-13 RX ORDER — ASPIRIN 81 MG/1
81 TABLET ORAL 2 TIMES DAILY
Qty: 60 TAB | Refills: 0 | Status: SHIPPED
Start: 2020-11-13

## 2020-11-13 RX ADMIN — POLYETHYLENE GLYCOL 3350 17 G: 17 POWDER, FOR SOLUTION ORAL at 09:16

## 2020-11-13 RX ADMIN — AMLODIPINE BESYLATE 2.5 MG: 5 TABLET ORAL at 09:16

## 2020-11-13 RX ADMIN — FAMOTIDINE 20 MG: 20 TABLET ORAL at 09:17

## 2020-11-13 RX ADMIN — BUSPIRONE HYDROCHLORIDE 10 MG: 10 TABLET ORAL at 09:17

## 2020-11-13 RX ADMIN — ACETAMINOPHEN 650 MG: 325 TABLET ORAL at 07:14

## 2020-11-13 RX ADMIN — DOCUSATE SODIUM 50 MG AND SENNOSIDES 8.6 MG 1 TABLET: 8.6; 5 TABLET, FILM COATED ORAL at 09:17

## 2020-11-13 RX ADMIN — MEMANTINE HYDROCHLORIDE 10 MG: 10 TABLET ORAL at 10:03

## 2020-11-13 RX ADMIN — Medication 81 MG: at 09:16

## 2020-11-13 RX ADMIN — RISPERIDONE 0.25 MG: 0.5 TABLET ORAL at 10:46

## 2020-11-13 NOTE — PROGRESS NOTES
ORTHO PROGRESS NOTE      SUBJECTIVE:  Da Denise denies hip pain. OBJECTIVE:  Patient Vitals for the past 24 hrs:   Temp Pulse BP   11/13/20 0916  94 130/68   11/13/20 0743 98.3 °F (36.8 °C) 92 127/69   11/13/20 0206 97.8 °F (36.6 °C) 90 (!) 158/74   11/12/20 2238 97.7 °F (36.5 °C) 82 103/68   11/12/20 1452 98.2 °F (36.8 °C) (!) 111 126/72       Awake, no distress. Lying in bed. No family present. Respirations unlabored. Dressing CDI. Mild pain with gentle PROM flexion hip but no pain with logroll. Thigh swollen but soft. Calves non-tender. Moves ankles OK. Recent Labs     11/12/20  0531   HGB 14.0   HCT 42.6      BUN 19   CREA 0.65   GFRAA >60   GFRNA >60       PT/OT:   Gait:  Gait  Base of Support: Widened, Shift to left  Speed/Celia: Slow, Shuffled  Step Length: Right shortened, Left shortened  Swing Pattern: Right asymmetrical  Stance: Right decreased  Gait Abnormalities: Antalgic, Decreased step clearance, Step to gait, Shuffling gait, Trunk sway increased  Ambulation - Level of Assistance: Moderate assistance, Adaptive equipment, Additional time  Distance (ft): 12 Feet (ft)  Assistive Device: Gait belt, Walker, rolling  Interventions: Safety awareness training, Tactile cues, Verbal cues, Visual/Demos            Interventions: Safety awareness training, Tactile cues, Verbal cues, Visual/Demos    ASSESSMENT:  Procedure: Procedure(s):  RIGHT HIP HEMIARTHROPLASTY  TI MICHAELS TABLE  ESSENTIAL  DEPUY AWARE  AVAILABLE AT 0800  Post Op day: 3 Days Post-Op      PLAN:  PT/OT: WBAT RLE with walker  Analgesics: Tylenol. No narcotics in over 24 hours. DVT proph: per surgeon ASA 81 mg BID  Disp planning. [address] when medically stable. OK from Ortho perspective. F/U: Dr. Gm Vidales 3-4 weeks post-op. Call again if questions.     Jenna Delong, PA  Orthopedic Trauma Service  [address] [place] [address]

## 2020-11-13 NOTE — PROGRESS NOTES
NELDA: Anticipate d/c to [facility]. Noted negative COVID test  11/11. [facility] phone [phone] transport time set for 12 PM.    CM called [facility] [phone] and spoke with Kathleen Bloom. Confirmed they can accept today. Discharge folder located on hard chart to include ambulance form and discharge instructions. RN to follow with [facility], Kardex and call report to #[phone]. Transition of Care Plan to SNF/Rehab    SNF/Rehab Transition:  Patient has been accepted to [address] and meets criteria for admission. Patient will transported by  [facility] and expected to leave at 12 PM    Communication to Patient/Family:  Met with patient and notified son in law Cuellar Manifold [phone]. and they are agreeable to the transition plan. Communication to SNF/Rehab:  Bedside RN, Selin Irwin, has been notified to update the transition plan to the facility and call report (phone number [phone]). Discharge information has been updated on the AVS.     Discharge instructions to be fax'd to facility at [facility] # [phone]). .    Nursing Please include all hard scripts for controlled substances, med rec and dc summary, and AVS in packet. Reviewed and confirmed with facility, Ritesh Faith, can manage the patient care needs for the following:     SNF/Rehab Transition:  Patient to follow-up with Home Health:  [facility], other ,none)  PCP/Specialist:       Elvis with (X) only those applicable:    Medication:  [x]  Medications will be available at the facility  []  IV Antibiotics  []  Controlled Substance - hard copy to be sent with patient   []  Weekly Labs   Documents:  [] Hard RX  [] MAR  [] Kardex  [] AVS  []Transfer Summary  []Discharge   Equipment:  []  CPAP/BiPAP  []  Wound Vacuum  []  Leone or Urinary Device  []  PICC/Central Line  []  Nebulizer  []  Ventilator   Treatment:  []Isolation (for MRSA, VRE, etc.)  []Surgical Drain Management  []Tracheostomy Care  []Dressing Changes  []Dialysis with transportation and chair time   []PEG Care  []Oxygen  []Daily Weights for Heart Failure   Dietary:  []Any diet limitations  []Tube Feedings   []Total Parenteral Management (TPN)   Eligible for Medicaid Long Term Services and Supports  Yes:  [] Eligible for medical assistance or will become eligible within 180 days and UAI completed. [] Provider/Patient and/or support system has requested screening. [] UAI copy provided to patient or responsible party,  [] UAI unavailable at discharge will send once processed to SNF provider. [] UAI unavailable at discharged mailed to patient  No:   [x] Private pay and is not financially eligible for Medicaid within the next 180 days. [] Reside out-of-state. [] A residents of a state owned/operated facility that is licensed  by [address] [employer] [facility] or [facility]  [] Enrollment in [facility] hospice services  [] [address] [place] [address] [facility]  [] Patient /Family declines to have screening completed or provide financial information for screening     Financial Resources:  Medicaid    [] Initiated and application pending   [] Full coverage     Advanced Care Plan:  []Surrogate Decision Maker of Care  []POA  []Communicated Code Status (DDNR\", \"Full\")    Other    Medicare pt has received, reviewed, and signed 2nd IM letter informing them of their right to appeal the discharge. Signed copy has been placed on pt bedside chart.            MACIEL Bolaños/CONY

## 2020-11-13 NOTE — PROGRESS NOTES
[facility] Adult  Hospitalist Group                                                                                          Hospitalist Progress Note  Charlie Pal, LIA  Answering service: [phone] OR [phone] from in house phone        Date of Service:  2020  NAME:  Chandni Lennon  :  1925  MRN:  [id number]      Admission Summary:   Per H&P: Shantell Morales is a 89 y. o. female who presents with fall  History is primary obtained from the patient  Patient presented to the ER from our [facility] with fall. James Rivera was being assisted when she had a fall. James Obrien came to the ER with apparent hip pain, was found to have a femoral fracture and was requested to be admitted to the hospitalist service. James Obrien has dementia, peers to be in significant pain, but no history could be obtained from the patient.  I did speak to the patient's son Vishal Hare who reports that patient has dementia, is not able to hold a conversation, and is a DNR.  No further history can be obtained from the patient or from the family       Interval history / Subjective:   Seen and examined patient sitting up in bed. She is awake, alert, and oriented x1  POD #4 for right hip hemiarthroplasty. Pt admitted by hospitalist team; attending transferred to Dr. Santamaria Early 11/10. D/C instructions, med rec and discharge order placed by ortho MOHINDER Garcia this AM. Discussed with Chavo Mccoy who states that it is unusual that ortho would assume care of this patient as hospitalist normally does; but confirms that this was the case and that ortho service will do the discharge summary for this patient.      Assessment & Plan:     Right femoral fracture due to fall   - Stable, s/p right hip hemiarthroplasty (11/10)   - CT head ordered  but not done  - Pain control, PT/OT per ortho     Hypertension   - Stable, continue home meds     Hypokalemia   - Resolved     Dehydration   - Resolved, continue to encourage PO intake      Dementia   - Stable  - Fall precautions   - Supportive care   - Continue Buspar, Memantine, Quetiapine, Riperidone  - f/u with PCP      Code status: Full   Patient discharge order placed this AM by ortho; transport here to  now. Hospital Problems  Date Reviewed: 11/10/2020          Codes Class Noted POA    Femoral neck fracture ([facility] ICD-10-CM: T90.520X  ICD-9-CM: 820.8  11/10/2020 Unknown        Femur fracture ([facility] ICD-10-CM: A39.13RZ  ICD-9-CM: 821.00  11/9/2020 Unknown                Review of Systems:   A comprehensive review of systems was negative except for that written in the HPI. Vital Signs:    Last 24hrs VS reviewed since prior progress note. Most recent are:  Visit Vitals  /68   Pulse 94   Temp 98.3 °F (36.8 °C)   Resp 17   Wt 57.7 kg (127 lb 3.3 oz)   SpO2 97%   BMI 24.04 kg/m²         Intake/Output Summary (Last 24 hours) at 11/13/2020 1135  Last data filed at 11/13/2020 0245  Gross per 24 hour   Intake    Output 400 ml   Net -400 ml        Physical Examination:             Constitutional:  No acute distress, cooperative, pleasant   ENT:  Oral mucosa moist, oropharynx benign. Resp:  CTA bilaterally. No wheezing/rhonchi/rales. No accessory muscle use   CV:  Regular rhythm, normal rate, no murmurs, gallops, rubs    GI:  Soft, non distended, non tender. normoactive bowel sounds, no hepatosplenomegaly     Musculoskeletal:  No edema, warm, 2+ pulses throughout. Left hip dressing noted- CDI     Neurologic:  Moves all extremities.   AAOx1, CN II-XII reviewed     Psych:  Not anxious or agitated        Data Review:    Review and/or order of clinical lab test      Labs:     Recent Labs     11/12/20 0531 11/11/20 0514   WBC 13.0* 13.0*   HGB 14.0 14.7   HCT 42.6 45.0    182     Recent Labs     11/12/20 0531 11/11/20 0514    140   K 3.4* 3.9   * 111*   CO2 24 20*   BUN 19 17   CREA 0.65 0.64   * 93   CA 8.6 8.8     No results for input(s): ALT, AP, TBIL, TBILI, TP, ALB, GLOB, GGT, AML, LPSE in the last 72 hours. No lab exists for component: SGOT, GPT, AMYP, HLPSE  No results for input(s): INR, PTP, APTT, INREXT, INREXT in the last 72 hours. No results for input(s): FE, TIBC, PSAT, FERR in the last 72 hours. No results found for: FOL, RBCF   No results for input(s): PH, PCO2, PO2 in the last 72 hours. No results for input(s): CPK, CKNDX, TROIQ in the last 72 hours.     No lab exists for component: CPKMB  No results found for: CHOL, CHOLX, CHLST, CHOLV, HDL, HDLP, LDL, LDLC, DLDLP, TGLX, TRIGL, TRIGP, CHHD, CHHDX  Lab Results   Component Value Date/Time    Glucose (POC) 136 (H) 03/15/2011 06:52 PM     Lab Results   Component Value Date/Time    Color YELLOW/STRAW 11/09/2020 03:55 AM    Appearance CLEAR 11/09/2020 03:55 AM    Specific gravity 1.019 11/09/2020 03:55 AM    pH (UA) 7.0 11/09/2020 03:55 AM    Protein 30 (A) 11/09/2020 03:55 AM    Glucose Negative 11/09/2020 03:55 AM    Ketone 15 (A) 11/09/2020 03:55 AM    Bilirubin Negative 11/09/2020 03:55 AM    Urobilinogen 1.0 11/09/2020 03:55 AM    Nitrites Negative 11/09/2020 03:55 AM    Leukocyte Esterase SMALL (A) 11/09/2020 03:55 AM    Epithelial cells FEW 11/09/2020 03:55 AM    Bacteria Negative 11/09/2020 03:55 AM    WBC 0-4 11/09/2020 03:55 AM    RBC 0-5 11/09/2020 03:55 AM         Medications Reviewed:     Current Facility-Administered Medications   Medication Dose Route Frequency    morphine injection 2 mg  2 mg IntraVENous Q4H PRN    sodium chloride (NS) flush 5-40 mL  5-40 mL IntraVENous Q8H    sodium chloride (NS) flush 5-40 mL  5-40 mL IntraVENous PRN    acetaminophen (TYLENOL) tablet 650 mg  650 mg Oral Q6H    oxyCODONE IR (ROXICODONE) tablet 5 mg  5 mg Oral Q3H PRN    naloxone (NARCAN) injection 0.4 mg  0.4 mg IntraVENous PRN    hydrOXYzine HCL (ATARAX) tablet 10 mg  10 mg Oral Q8H PRN    famotidine (PEPCID) tablet 20 mg  20 mg Oral BID    senna-docusate (PERICOLACE) 8.6-50 mg per tablet 1 Tab  1 Tab Oral BID    polyethylene glycol (MIRALAX) packet 17 g  17 g Oral DAILY    bisacodyL (DULCOLAX) suppository 10 mg  10 mg Rectal DAILY PRN    aspirin delayed-release tablet 81 mg  81 mg Oral BID    amLODIPine (NORVASC) tablet 2.5 mg  2.5 mg Oral DAILY    busPIRone (BUSPAR) tablet 10 mg  10 mg Oral TID    memantine (NAMENDA) tablet 10 mg  10 mg Oral Q12H    QUEtiapine (SEROquel) tablet 12.5 mg  12.5 mg Oral QHS    risperiDONE (RisperDAL) tablet 0.25 mg  0.25 mg Oral ACL    0.9% sodium chloride infusion  75 mL/hr IntraVENous CONTINUOUS    sodium chloride (NS) flush 5-40 mL  5-40 mL IntraVENous Q8H    sodium chloride (NS) flush 5-40 mL  5-40 mL IntraVENous PRN    naloxone (NARCAN) injection 0.4 mg  0.4 mg IntraVENous PRN     ______________________________________________________________________  EXPECTED LENGTH OF STAY: - - -  ACTUAL LENGTH OF STAY:          [address] [facility] Terri Conti NP

## 2020-11-13 NOTE — DISCHARGE INSTRUCTIONS
Post op Discharge Instructions Hip Fracture   Yan Bell, [facility]  [phone]    Patient Name: Saima Pedroza  Date of admission:  11/9/2020  Date of procedure: 11/10/2020   Procedure: Procedure(s):  RIGHT HIP HEMIARTHROPLASTY  HOSSEINS BRANDO TABLE  ESSENTIAL  [address] AT 0800  PCP: Felisha Yee MD  Date of discharge: No discharge date for patient encounter. Follow up office visit   See Dr. Christian Turpin approximately 3-4 weeks from date of surgery. Call [phone] (ext [phone]) to make an appointment. Activity  Walk with your walker with weight bearing restrictions as instructed by your physical therapist.  Continue using your walker until seen for follow-up visit. You should walk daily with the physical therapist.  Perform your exercise routine 3 times a day as instructed by the physical therapist.    Incision Care  The Aquacel (brown, waterproof) surgical dressing is to remain on the hip for 7 days. On the 7th day gently peel the dressing off by carefully lifting the edge and stretching it slightly to break the adhesive seal.    If your Aquacel dressing comes loose/off before the 7th day, you may replace it with a dry sterile gauze dressing; change it daily. Once the incision is not draining, leave it open to air. You may take a shower with the Aquacel dressing in place. Once the Aquacel is removed,  may shower and get your incision wet but do not submerge your incision under water in a bath tub, hot tub or swimming pool for 6 weeks after surgery. Preventing blood clots  Aspirin 81mg twice daily for 4 weeks following your surgery date    Pain management  - Please take scheduled 650 mg tylenol every 6 hours for 6 weeks  - Please take tramadol every 6 hours for pain as needed for pain.     - Avoid alcoholic beverages while taking pain medication  - Do not take any over-the-counter medication for pain except Tylenol (acetaminophen)  - Please be aware that many medications contain Tylenol. We do not want you to over medicate so please read the information below as a guide. Do not take more than 4 Grams of Tylenol in a 24 hour  - You may place an ice bag on your knee for 15-20 minutes after exercising and as needed throughout the day and night      Diet  Resume usual diet; encourage fluids; provide foods high in fiber, calcium and vitamin D  Provide stool softeners/laxatives as needed      After [facility] for SNF/Rehab (to be followed by post-acute provider)    Nursing  Complete head to toe assessment, vital signs  Medication reconciliation  Review pain management  Manage chronic medical conditions  Remove Aquacel dressing 7 days after surgery    Physical Therapy-status at discharge from the hospital    Weight bearing status:  Precautions at Admission: Fall, WBAT  Left Side Weight Bearing: Full  Right Side Weight Bearing: As tolerated    Mobility Status:  Supine to Sit: Moderate assistance, Additional time, Bed Modified(HOB elevated)  Sit to Stand: Moderate assistance, Adaptive equipment, Additional time  Sit to Supine: Moderate assistance, Additional time  Bed to Chair: (unable to advance)    Gait:                ADL status overall composite:                Physical Therapy-exercises, transfers, gait-training (partial weight bearing on the surgical leg)    Exercises related to strengthening the surgical hip:  Supine Exercises: Ankle pumps  Quad Sets  Gluteal Sets  Hip Abduction slides supine  Hip external rotation  Heel Slides    Standing Exercises:  Heel Raises  Mini-squats  Heel/toe touches and knee bend  Marching   Hip Abduction  Hip External Rotation  Hip Extension    Advance exercises with equipment for strengthening, flexibility, balance needs as appropriate per setting. Avoid active hip flexion exercised for 4 weeks. Repetitions and number of sets to be established per patient tolerance     Reasons to call the Surgeon  1.   Increased redness, swelling or drainage from the incision site  2. Temperature consistently greater than 101 degrees  3.   Increased pain or unrelieved pain in hip or calf

## 2020-11-13 NOTE — PROGRESS NOTES
TRANSFER - OUT REPORT:    Verbal report given to Lizzy(name) on Da Denise  being transferred to [address] [place] [address](unit) for routine progression of care       Report consisted of patients Situation, Background, Assessment and   Recommendations(SBAR). Information from the following report(s) SBAR, Kardex, Intake/Output and MAR was reviewed with the receiving nurse. Lines:       Opportunity for questions and clarification was provided.

## 2020-11-13 NOTE — PROGRESS NOTES
Bedside and Verbal shift change report given to COLLIN Trinidad (oncoming nurse) by Lawyer Candelaria RN (offgoing nurse). Report included the following information SBAR, OR Summary, Intake/Output, MAR and Recent Results.

## 2020-11-16 ENCOUNTER — PATIENT OUTREACH (OUTPATIENT)
Dept: CASE MANAGEMENT | Age: 85
End: 2020-11-16

## 2020-11-16 NOTE — PROGRESS NOTES
Transition of care outreach postponed for 14 days due to patient's discharge to SNF.   [facility] 11/9-11/13/20 femur fx. d/c LORE f/u 14d

## 2020-11-16 NOTE — NURSE NAVIGATOR
[facility]  SBAR Orthopaedic Pathway Handoff     FROM:                                TO: [facility] AT [facility]                                                      ([facility] or Facility name)  Lazaro Boykin 55  [address] [place] [address]  Dept: [address] [facility] [address]: [phone]                                      Room#:  901/09                                                       Nurse Navigator:  Brian Jamil RN         SITUATION      ASAScore: ASA 3 - Patient with moderate systemic disease with functional limitations    Admitted:  11/9/2020  Hospital Day: 5      Attending Provider:  No att. providers found     Consultations:  IP CONSULT TO ORTHOPEDIC SURGERY    PCP:  Emma Enriquez MD   [phone]     Admitting Dx: Femur fracture (HCC) [S72.90XA]  Femoral neck fracture ([address] [S72.009A]       Active Problems:    Femur fracture ([address] (11/9/2020)      Femoral neck fracture (HCC) (11/10/2020)      6 Days Post-Op of   Procedure(s):  RIGHT HIP HEMIARTHROPLASTY  WANTS BRANDO TABLE  ESSENTIAL  DEPUY AWARE  AVAILABLE AT 0800   BY: Adriel Wood MD             ON: 11/10/2020                  Code Status: Prior             Advance Directive? Yes Not W Pt (Send w/patient)     Isolation:  There are currently no Active Isolations       MDRO: No current active infections    BACKGROUND     Allergies: Allergies   Allergen Reactions    Cephalexin Nausea Only    Macrobid [Nitrofurantoin Monohyd/M-Cryst] Myalgia    Razadyne [Galantamine] Nausea Only    Sulfa (Sulfonamide Antibiotics) Rash       Past Medical History:   Diagnosis Date    DJD (degenerative joint disease)     Hematuria, microscopic     Hypertension     Memory loss     Menopause     Osteopenia        Past Surgical History:   Procedure Laterality Date    HX HYSTERECTOMY         Prior to Admission Medications   Prescriptions Last Dose Informant Patient Reported? Taking?    QUEtiapine (SEROQUEL) 25 mg tablet   No Yes   Sig: Take 0.5 Tabs by mouth nightly. acetaminophen (TYLENOL) 325 mg tablet   Yes No   Sig: Take  by mouth every four (4) hours as needed for Pain. amLODIPine (NORVASC) 2.5 mg tablet   No Yes   Sig: Take 1 Tab by mouth daily. busPIRone (BUSPAR) 10 mg tablet   No Yes   Sig: Take 1 Tab by mouth three (3) times daily. hydroCHLOROthiazide (MICROZIDE) 12.5 mg capsule   No Yes   Sig: TAKE ONE CAPSULE BY MOUTH EVERY MORNING   loratadine (CLARITIN) 10 mg tablet   No Yes   Sig: Take 1 Tab by mouth daily. For allergies. memantine (NAMENDA) 10 mg tablet   No Yes   Sig: TAKE 1 TABLET BY MOUTH TWICE A DAY   multivitamin (ONE A DAY) tablet   Yes Yes   Sig: Take 1 Tab by mouth daily. potassium chloride SR (KLOR-CON 8) 8 mEq tablet   No Yes   Sig: TAKE 1 TABLET BY MOUTH EVERY DAY   risperiDONE (RisperDAL) 0.25 mg tablet   Yes Yes   Sig: Take 0.25 mg by mouth Daily (before lunch). Facility-Administered Medications: None       Vaccinations:    Immunization History   Administered Date(s) Administered    (RETIRED) Pneumococcal Vaccine (Unspecified Type) 10/27/2005    Influenza High Dose Vaccine PF 11/24/2015, 01/01/2017, 10/17/2017, 10/22/2018, 10/20/2019    Influenza Vaccine Split 10/10/2010    PPD 08/23/2011    Pneumococcal Conjugate (PCV-13) 03/28/2017    Td 05/12/2013    Tdap 04/06/2017    Zoster 06/01/2007         ASSESSMENT   Age: 80 y. o. Gender: female        Height:                      Weight:Weight: 57.7 kg (127 lb 3.3 oz)     No data found. Active Orders   There are no active orders of the following types: Diet.        Orientation: Orientation Level: Disoriented X4    Active Lines/Drains:  (Peg Tube / Leone / CL or S/L?):no    Urinary Status: Has not voided      Last BM: Last Bowel Movement Date: 11/09/20     Skin Integrity: Incision (comment)(right hip dry dressing)             Mobility: Very limited   Weight Bearing Status: WBAT (Weight Bearing as Tolerated)      Gait Training  Assistive Device: Gait belt, Walker, rolling  Ambulation - Level of Assistance: Moderate assistance, Adaptive equipment, Additional time  Distance (ft): 12 Feet (ft)  Interventions: Safety awareness training, Tactile cues, Verbal cues, Visual/Demos     On Anticoagulation? YES  Aspirin                                         Pain Medications given:  Tylenol                                   Lab Results   Component Value Date/Time    Glucose 114 (H) 11/12/2020 05:31 AM    Hemoglobin A1c 5.0 01/24/2020 12:00 AM    INR 1.0 11/09/2020 03:49 AM    INR 1.0 12/04/2014 08:36 AM    HGB 14.0 11/12/2020 05:31 AM    HGB 14.7 11/11/2020 05:14 AM    HGB 14.7 11/10/2020 03:59 AM    HGB 15.6 11/09/2020 11:00 AM       Readmission Risks:  Score:         RECOMMENDATION     See After Visit Summary (AVS) for:  · Discharge instructions  · After [address]   · Medication Reconciliation          [address] [place] [address] Orthopaedic Nurse Navigator  AYAZ Sal, RN-BC       Office  [phone]  [facility]      [phone]  Fax      [phone]  [email or web address]             . Phy

## 2020-11-20 NOTE — TELEPHONE ENCOUNTER
Call Demi Ortiz- start augmentin 875 mg bid x 7 days. Repeat urinalysis and culture in 2 weeks.  If infection is refractory may require urology consult [facility]ski Pregnancy And Lactation Text: This medication is Pregnancy Category D and isn't considered safe during pregnancy. It is excreted in breast milk.

## 2020-11-30 ENCOUNTER — PATIENT OUTREACH (OUTPATIENT)
Dept: CASE MANAGEMENT | Age: 85
End: 2020-11-30

## 2020-11-30 NOTE — PROGRESS NOTES
Transition of care outreach postponed for 14 days due to patient's discharge to SNF.   D/c to [facility] 11/13/20 f/u 14d

## 2020-12-15 ENCOUNTER — PATIENT OUTREACH (OUTPATIENT)
Dept: CASE MANAGEMENT | Age: 85
End: 2020-12-15

## 2020-12-15 NOTE — PROGRESS NOTES
Transition of care outreach postponed for 14 days due to patient's discharge to SNF.   Still admitted f/u 14d

## 2020-12-29 ENCOUNTER — PATIENT OUTREACH (OUTPATIENT)
Dept: CASE MANAGEMENT | Age: 85
End: 2020-12-29

## 2021-01-15 NOTE — DISCHARGE SUMMARY
Discharge Summary     Patient ID:  Felix Bowman  [id number]  73 y.o.  5/21/1925    Admit Date: 11/9/2020    Discharge Date: 1/15/2021    Admission Diagnoses: Femur fracture ([facility] [S72.90XA]  Femoral neck fracture ([facility] [S72.009A]    Discharge Diagnoses: Active Problems:    Femur fracture ([facility] (11/9/2020)      Femoral neck fracture ([facility] (11/10/2020)         Admission Condition: Good    Discharge Condition: Good    Last Procedure: Procedure(s):  RIGHT HIP HEMIARTHROPLASTY  WANTDARREN MICHAELS TABLE  ESSENTIAL  DEPUY AWARE  AVAILABLE AT 0800      Medications:   No current facility-administered medications for this encounter. Current Outpatient Medications   Medication Sig    aspirin delayed-release 81 mg tablet Take 1 Tab by mouth two (2) times a day.  risperiDONE (RisperDAL) 0.25 mg tablet Take 0.25 mg by mouth Daily (before lunch).  loratadine (CLARITIN) 10 mg tablet Take 1 Tab by mouth daily. For allergies.  busPIRone (BUSPAR) 10 mg tablet Take 1 Tab by mouth three (3) times daily.  amLODIPine (NORVASC) 2.5 mg tablet Take 1 Tab by mouth daily.  potassium chloride SR (KLOR-CON 8) 8 mEq tablet TAKE 1 TABLET BY MOUTH EVERY DAY    hydroCHLOROthiazide (MICROZIDE) 12.5 mg capsule TAKE ONE CAPSULE BY MOUTH EVERY MORNING    QUEtiapine (SEROQUEL) 25 mg tablet Take 0.5 Tabs by mouth nightly.  multivitamin (ONE A DAY) tablet Take 1 Tab by mouth daily.  memantine (NAMENDA) 10 mg tablet TAKE 1 TABLET BY MOUTH TWICE A DAY    acetaminophen (TYLENOL) 325 mg tablet Take  by mouth every four (4) hours as needed for Pain. Hospital Course:   HPI: Felix Bowman is a 80 y.o. female who presents with fall     History is primary obtained from the patient     Patient presented to the ER from our [facility] with fall. Patient was being assisted when she had a fall.   Patient came to the ER with apparent hip pain, was found to have a femoral fracture and was requested to be admitted to the hospitalist service. Patient has dementia, peers to be in significant pain, but no history could be obtained from the patient. Hospitalist who admitted patient spoke to the patient's son Lauren  who reports that patient has dementia, is not able to hold a conversation, and is a DNR. No further history can be obtained from the patient or from the family. Imaging showed displaced right femoral fracture. The patient underwent right hip lisa-arthoplasty on 10/11/20. They tolerated the procedure well. Pain was controlled post-operatively with a multimodal pain regiment including tylenol, tramadol, and oxycodone for breakthrough. Physical therapy began to work with the patient on POD#0 and continued daily. 24 hours of perioperative antibiotics were completed. Lovenox was given for DVT prophylaxis beginning on POD#0. Hospitalist followed along for medical management throughout stay. The patient progressed well and [facility] once they cleared therapy. Consults: Hospitalist    Significant Diagnostic Studies: post op xrays showed a stable Procedure(s):  RIGHT HIP HEMIARTHROPLASTY  HOSSEINS BRANDO TABLE  ESSENTIAL  DEPUY AWARE  AVAILABLE AT 0800    Disposition: [address]     The patient was discharged with the following instructions:   1.) She will take Lovnex for DVT prophylaxis, tylenol, and oxycodone for breakthrough pain. 2.) Shower and wound instructions were given. 3.) Activity: PT/OT Eval and Treat and See surgical instructions  4.) Diet: Regular      Follow-up with Sonia Patel PA-C/Aron Alvarado MD in 2-3 weeks after her discharge. Sooner if there is a problem. Cannot display discharge medications since this patient is not currently admitted.       Signed:  Sonia Patel PA-C  1/15/2021, 9:45 AM

## 2021-05-07 ENCOUNTER — APPOINTMENT (OUTPATIENT)
Dept: GENERAL RADIOLOGY | Age: 86
DRG: 522 | End: 2021-05-07
Attending: HOSPITALIST
Payer: MEDICARE

## 2021-05-07 ENCOUNTER — APPOINTMENT (OUTPATIENT)
Dept: GENERAL RADIOLOGY | Age: 86
DRG: 522 | End: 2021-05-07
Attending: EMERGENCY MEDICINE
Payer: MEDICARE

## 2021-05-07 ENCOUNTER — HOSPITAL ENCOUNTER (INPATIENT)
Age: 86
LOS: 5 days | Discharge: NURSING, REHAB OR LONG-TERM CARE | DRG: 522 | End: 2021-05-12
Attending: EMERGENCY MEDICINE | Admitting: HOSPITALIST
Payer: MEDICARE

## 2021-05-07 DIAGNOSIS — S72.002A CLOSED FRACTURE OF LEFT HIP, INITIAL ENCOUNTER (HCC): Primary | ICD-10-CM
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
## 2021-05-07 PROBLEM — S72.009A HIP FRACTURE (HCC): Status: ACTIVE | Noted: 2021-05-07

## 2021-05-07 LAB
ANION GAP SERPL CALC-SCNC: 7 MMOL/L (ref 5–15)
APPEARANCE UR: CLEAR
BACTERIA URNS QL MICRO: ABNORMAL /HPF
BASOPHILS # BLD: 0 K/UL (ref 0–0.1)
BASOPHILS NFR BLD: 0 % (ref 0–1)
BILIRUB UR QL: NEGATIVE
BUN SERPL-MCNC: 12 MG/DL (ref 6–20)
BUN/CREAT SERPL: 29 (ref 12–20)
CALCIUM SERPL-MCNC: 8 MG/DL (ref 8.5–10.1)
CHLORIDE SERPL-SCNC: 111 MMOL/L (ref 97–108)
CO2 SERPL-SCNC: 25 MMOL/L (ref 21–32)
COLOR UR: ABNORMAL
COMMENT, HOLDF: NORMAL
COVID-19 RAPID TEST, COVR: NOT DETECTED
CREAT SERPL-MCNC: 0.42 MG/DL (ref 0.55–1.02)
DIFFERENTIAL METHOD BLD: ABNORMAL
EOSINOPHIL # BLD: 0 K/UL (ref 0–0.4)
EOSINOPHIL NFR BLD: 0 % (ref 0–7)
EPITH CASTS URNS QL MICRO: ABNORMAL /LPF
ERYTHROCYTE [DISTWIDTH] IN BLOOD BY AUTOMATED COUNT: 13.7 % (ref 11.5–14.5)
GLUCOSE SERPL-MCNC: 95 MG/DL (ref 65–100)
GLUCOSE UR STRIP.AUTO-MCNC: NEGATIVE MG/DL
HCT VFR BLD AUTO: 36.1 % (ref 35–47)
HGB BLD-MCNC: 11.2 G/DL (ref 11.5–16)
HGB UR QL STRIP: ABNORMAL
HYALINE CASTS URNS QL MICRO: ABNORMAL /LPF (ref 0–5)
IMM GRANULOCYTES # BLD AUTO: 0.1 K/UL (ref 0–0.04)
IMM GRANULOCYTES NFR BLD AUTO: 1 % (ref 0–0.5)
KETONES UR QL STRIP.AUTO: NEGATIVE MG/DL
LEUKOCYTE ESTERASE UR QL STRIP.AUTO: ABNORMAL
LYMPHOCYTES # BLD: 0.4 K/UL (ref 0.8–3.5)
LYMPHOCYTES NFR BLD: 5 % (ref 12–49)
MCH RBC QN AUTO: 30.6 PG (ref 26–34)
MCHC RBC AUTO-ENTMCNC: 31 G/DL (ref 30–36.5)
MCV RBC AUTO: 98.6 FL (ref 80–99)
MONOCYTES # BLD: 0.8 K/UL (ref 0–1)
MONOCYTES NFR BLD: 9 % (ref 5–13)
NEUTS SEG # BLD: 7.5 K/UL (ref 1.8–8)
NEUTS SEG NFR BLD: 85 % (ref 32–75)
NITRITE UR QL STRIP.AUTO: POSITIVE
NRBC # BLD: 0 K/UL (ref 0–0.01)
NRBC BLD-RTO: 0 PER 100 WBC
PH UR STRIP: 7.5 [PH] (ref 5–8)
PLATELET # BLD AUTO: 148 K/UL (ref 150–400)
PMV BLD AUTO: 10.1 FL (ref 8.9–12.9)
POTASSIUM SERPL-SCNC: 3.4 MMOL/L (ref 3.5–5.1)
PROT UR STRIP-MCNC: NEGATIVE MG/DL
RBC # BLD AUTO: 3.66 M/UL (ref 3.8–5.2)
RBC #/AREA URNS HPF: ABNORMAL /HPF (ref 0–5)
RBC MORPH BLD: ABNORMAL
SAMPLES BEING HELD,HOLD: NORMAL
SODIUM SERPL-SCNC: 143 MMOL/L (ref 136–145)
SOURCE, COVRS: NORMAL
SP GR UR REFRACTOMETRY: 1.01 (ref 1–1.03)
UR CULT HOLD, URHOLD: NORMAL
UROBILINOGEN UR QL STRIP.AUTO: 1 EU/DL (ref 0.2–1)
WBC # BLD AUTO: 8.8 K/UL (ref 3.6–11)
WBC URNS QL MICRO: ABNORMAL /HPF (ref 0–4)

## 2021-05-07 PROCEDURE — 93005 ELECTROCARDIOGRAM TRACING: CPT

## 2021-05-07 PROCEDURE — 85025 COMPLETE CBC W/AUTO DIFF WBC: CPT

## 2021-05-07 PROCEDURE — 65270000029 HC RM PRIVATE

## 2021-05-07 PROCEDURE — 87635 SARS-COV-2 COVID-19 AMP PRB: CPT

## 2021-05-07 PROCEDURE — 73552 X-RAY EXAM OF FEMUR 2/>: CPT

## 2021-05-07 PROCEDURE — 80048 BASIC METABOLIC PNL TOTAL CA: CPT

## 2021-05-07 PROCEDURE — 99285 EMERGENCY DEPT VISIT HI MDM: CPT

## 2021-05-07 PROCEDURE — 74011250637 HC RX REV CODE- 250/637: Performed by: HOSPITALIST

## 2021-05-07 PROCEDURE — 71045 X-RAY EXAM CHEST 1 VIEW: CPT

## 2021-05-07 PROCEDURE — 81001 URINALYSIS AUTO W/SCOPE: CPT

## 2021-05-07 RX ORDER — ACETAMINOPHEN 325 MG/1
650 TABLET ORAL EVERY 8 HOURS
Status: DISCONTINUED | OUTPATIENT
Start: 2021-05-07 | End: 2021-05-12 | Stop reason: HOSPADM

## 2021-05-07 RX ORDER — RISPERIDONE 0.5 MG/1
0.25 TABLET, FILM COATED ORAL
Status: DISCONTINUED | OUTPATIENT
Start: 2021-05-08 | End: 2021-05-12 | Stop reason: HOSPADM

## 2021-05-07 RX ORDER — ASPIRIN 81 MG/1
81 TABLET ORAL 2 TIMES DAILY
Status: DISCONTINUED | OUTPATIENT
Start: 2021-05-07 | End: 2021-05-12 | Stop reason: HOSPADM

## 2021-05-07 RX ORDER — AMLODIPINE BESYLATE 5 MG/1
2.5 TABLET ORAL DAILY
Status: DISCONTINUED | OUTPATIENT
Start: 2021-05-08 | End: 2021-05-12 | Stop reason: HOSPADM

## 2021-05-07 RX ORDER — RISPERIDONE 0.5 MG/1
1 TABLET, FILM COATED ORAL EVERY EVENING
Status: DISCONTINUED | OUTPATIENT
Start: 2021-05-08 | End: 2021-05-12 | Stop reason: HOSPADM

## 2021-05-07 RX ORDER — MORPHINE SULFATE 2 MG/ML
2 INJECTION, SOLUTION INTRAMUSCULAR; INTRAVENOUS
Status: DISCONTINUED | OUTPATIENT
Start: 2021-05-07 | End: 2021-05-12 | Stop reason: HOSPADM

## 2021-05-07 RX ORDER — BUSPIRONE HYDROCHLORIDE 10 MG/1
10 TABLET ORAL 3 TIMES DAILY
Status: DISCONTINUED | OUTPATIENT
Start: 2021-05-07 | End: 2021-05-12 | Stop reason: HOSPADM

## 2021-05-07 RX ORDER — SODIUM CHLORIDE 0.9 % (FLUSH) 0.9 %
5-40 SYRINGE (ML) INJECTION EVERY 8 HOURS
Status: DISCONTINUED | OUTPATIENT
Start: 2021-05-07 | End: 2021-05-12 | Stop reason: HOSPADM

## 2021-05-07 RX ORDER — LORATADINE 10 MG/1
10 TABLET ORAL DAILY
Status: DISCONTINUED | OUTPATIENT
Start: 2021-05-08 | End: 2021-05-12 | Stop reason: HOSPADM

## 2021-05-07 RX ORDER — MEMANTINE HYDROCHLORIDE 10 MG/1
10 TABLET ORAL 2 TIMES DAILY
Status: DISCONTINUED | OUTPATIENT
Start: 2021-05-07 | End: 2021-05-12 | Stop reason: HOSPADM

## 2021-05-07 RX ORDER — RISPERIDONE 1 MG/1
1 TABLET, FILM COATED ORAL EVERY EVENING
COMMUNITY

## 2021-05-07 RX ORDER — TRAZODONE HYDROCHLORIDE 50 MG/1
50 TABLET ORAL
Status: DISCONTINUED | OUTPATIENT
Start: 2021-05-07 | End: 2021-05-12 | Stop reason: HOSPADM

## 2021-05-07 RX ORDER — HALOPERIDOL 5 MG/ML
1 INJECTION INTRAMUSCULAR
Status: DISCONTINUED | OUTPATIENT
Start: 2021-05-07 | End: 2021-05-12 | Stop reason: HOSPADM

## 2021-05-07 RX ORDER — ONDANSETRON 2 MG/ML
4 INJECTION INTRAMUSCULAR; INTRAVENOUS
Status: DISCONTINUED | OUTPATIENT
Start: 2021-05-07 | End: 2021-05-12 | Stop reason: HOSPADM

## 2021-05-07 RX ORDER — POLYETHYLENE GLYCOL 3350 17 G/17G
17 POWDER, FOR SOLUTION ORAL DAILY PRN
Status: DISCONTINUED | OUTPATIENT
Start: 2021-05-07 | End: 2021-05-12 | Stop reason: HOSPADM

## 2021-05-07 RX ORDER — SODIUM CHLORIDE 0.9 % (FLUSH) 0.9 %
5-40 SYRINGE (ML) INJECTION AS NEEDED
Status: DISCONTINUED | OUTPATIENT
Start: 2021-05-07 | End: 2021-05-12 | Stop reason: HOSPADM

## 2021-05-07 RX ORDER — PROMETHAZINE HYDROCHLORIDE 25 MG/1
12.5 TABLET ORAL
Status: DISCONTINUED | OUTPATIENT
Start: 2021-05-07 | End: 2021-05-12 | Stop reason: HOSPADM

## 2021-05-07 RX ORDER — ACETAMINOPHEN 325 MG/1
650 TABLET ORAL
COMMUNITY
End: 2021-05-07

## 2021-05-07 RX ORDER — TRAMADOL HYDROCHLORIDE 50 MG/1
50 TABLET ORAL
Status: DISCONTINUED | OUTPATIENT
Start: 2021-05-07 | End: 2021-05-12 | Stop reason: HOSPADM

## 2021-05-07 RX ORDER — TRAZODONE HYDROCHLORIDE 50 MG/1
50 TABLET ORAL
COMMUNITY

## 2021-05-07 RX ADMIN — ACETAMINOPHEN 650 MG: 325 TABLET ORAL at 22:16

## 2021-05-07 RX ADMIN — MEMANTINE HYDROCHLORIDE 10 MG: 10 TABLET ORAL at 22:16

## 2021-05-07 RX ADMIN — ASPIRIN 81 MG: 81 TABLET, COATED ORAL at 22:16

## 2021-05-07 RX ADMIN — TRAZODONE HYDROCHLORIDE 50 MG: 50 TABLET ORAL at 22:16

## 2021-05-07 RX ADMIN — Medication 10 ML: at 22:00

## 2021-05-07 RX ADMIN — BUSPIRONE HYDROCHLORIDE 10 MG: 10 TABLET ORAL at 22:16

## 2021-05-07 NOTE — PROGRESS NOTES
Admission Medication Reconciliation:      Medication reconciliation services were provided by CINDY Bansal- administration times were updated as well. Thank you for allowing me to participate in the care of your patient. Sweetie Meehan PharmD, RN #[id number] benefit data reflects medications filled and processed through the patient's insurance, however   this data does NOT capture whether the medication was picked up or is currently being taken by the patient. Allergies:  Cephalexin, Macrobid [nitrofurantoin monohyd/m-cryst], Razadyne [galantamine], and Sulfa (sulfonamide antibiotics)    Significant PMH/Disease States:   Past Medical History:   Diagnosis Date    DJD (degenerative joint disease)     Hematuria, microscopic     Hypertension     Memory loss     Menopause     Osteopenia      Chief Complaint for this Admission:    Chief Complaint   Patient presents with    Hip Pain     Prior to Admission Medications:   Prior to Admission Medications   Prescriptions Last Dose Informant Taking?   acetaminophen (TYLENOL) 325 mg tablet 5/7/2021 at 0012  Yes   Sig: Take 650 mg by mouth every four (4) hours as needed for Pain. amLODIPine (NORVASC) 2.5 mg tablet 5/6/2021 at 0900  Yes   Sig: Take 1 Tab by mouth daily. aspirin delayed-release 81 mg tablet 5/6/2021 at 1700  Yes   Sig: Take 1 Tab by mouth two (2) times a day. busPIRone (BUSPAR) 10 mg tablet 5/6/2021 at 1700  Yes   Sig: Take 1 Tab by mouth three (3) times daily. loratadine (CLARITIN) 10 mg tablet 5/6/2021 at 0900  Yes   Sig: Take 1 Tab by mouth daily. For allergies. memantine (NAMENDA) 10 mg tablet 5/6/2021 at 1700  Yes   Sig: TAKE 1 TABLET BY MOUTH TWICE A DAY   multivitamin (ONE A DAY) tablet 5/6/2021 at 0900  Yes   Sig: Take 1 Tab by mouth daily. risperiDONE (RisperDAL) 0.25 mg tablet 5/6/2021 at 1200  Yes   Sig: Take 0.25 mg by mouth Daily (before lunch).    risperiDONE (RisperDAL) 1 mg tablet 5/6/2021 at 1700  Yes   Sig: Take 1 mg by mouth every evening. Patient takes in the evening at 1700. traZODone (DESYREL) 50 mg tablet 5/6/2021 at 2100  Yes   Sig: Take 50 mg by mouth nightly. Facility-Administered Medications: None     Please contact the main inpatient pharmacy with any questions or concerns at [phone] and we will direct you to the clinical pharmacist covering this patient's care while in-house.    ARTI Santillan

## 2021-05-07 NOTE — PROGRESS NOTES
Bedside and Verbal shift change report given to Jae Guevara RN (oncoming nurse) by Daniele Roman RN (offgoing nurse). Report included the following information SBAR, ED Summary, MAR and Recent Results.

## 2021-05-07 NOTE — ED PROVIDER NOTES
77-year-old female history of hypertension, dementia, degenerative joint disease presents to the emergency department for reported fall yesterday. She resides at a nursing home and had mechanical fall yesterday. X-ray performed at the nursing home today concerning for left femur fracture. She denies any other injuries. She is reportedly at her baseline mental status of ANO x2.  She only complains of pain during palpation or manipulation of her left leg. The history is provided by the patient, the EMS personnel and medical records. Hip Injury   This is a new problem. The current episode started yesterday. The problem occurs constantly. The problem has not changed since onset. The pain is present in the left upper leg. The symptoms are aggravated by movement and palpation. She has tried nothing for the symptoms. There has been a history of trauma.         Past Medical History:   Diagnosis Date    DJD (degenerative joint disease)     Hematuria, microscopic     Hypertension     Memory loss     Menopause     Osteopenia        Past Surgical History:   Procedure Laterality Date    HX HYSTERECTOMY           Family History:   Problem Relation Age of Onset    Heart Disease Mother         congestive heart failure    Stroke Father     Alcohol abuse Son     Alcohol abuse Daughter        Social History     Socioeconomic History    Marital status:      Spouse name: Not on file    Number of children: Not on file    Years of education: Not on file    Highest education level: Not on file   Occupational History    Not on file   Social Needs    Financial resource strain: Not on file    Food insecurity     Worry: Not on file     Inability: Not on file    Transportation needs     Medical: Not on file     Non-medical: Not on file   Tobacco Use    Smoking status: Never Smoker    Smokeless tobacco: Never Used   Substance and Sexual Activity    Alcohol use: No     Alcohol/week: 0.0 standard drinks    Drug use: No    Sexual activity: Never   Lifestyle    Physical activity     Days per week: Not on file     Minutes per session: Not on file    Stress: Not on file   Relationships    Social connections     Talks on phone: Not on file     Gets together: Not on file     Attends [employer] service: Not on file     Active member of club or organization: Not on file     Attends meetings of clubs or organizations: Not on file     Relationship status: Not on file    Intimate partner violence     Fear of current or ex partner: Not on file     Emotionally abused: Not on file     Physically abused: Not on file     Forced sexual activity: Not on file   Other Topics Concern    Not on file   Social History Narrative    Not on file         ALLERGIES: Cephalexin, Macrobid [nitrofurantoin monohyd/m-cryst], Razadyne [galantamine], and Sulfa (sulfonamide antibiotics)    Review of Systems   Constitutional: Negative for fatigue and fever. HENT: Negative for sneezing and sore throat. Respiratory: Negative for cough and shortness of breath. Cardiovascular: Negative for chest pain and leg swelling. Gastrointestinal: Negative for abdominal pain, diarrhea, nausea and vomiting. Genitourinary: Negative for difficulty urinating and dysuria. Musculoskeletal: Negative for arthralgias and myalgias. Skin: Negative for color change and rash. Neurological: Negative for weakness and headaches. Psychiatric/Behavioral: Negative for agitation and behavioral problems. There were no vitals filed for this visit. Physical Exam  Vitals signs and nursing note reviewed. Constitutional:       General: She is not in acute distress. Appearance: Normal appearance. She is well-developed. She is not ill-appearing, toxic-appearing or diaphoretic. HENT:      Head: Normocephalic and atraumatic. Nose: Nose normal.      Mouth/Throat:      Mouth: Mucous membranes are moist.      Pharynx: Oropharynx is clear.    Eyes: Extraocular Movements: Extraocular movements intact. Conjunctiva/sclera: Conjunctivae normal.      Pupils: Pupils are equal, round, and reactive to light. Neck:      Musculoskeletal: Normal range of motion and neck supple. No neck rigidity or muscular tenderness. Cardiovascular:      Rate and Rhythm: Normal rate and regular rhythm. Pulses: Normal pulses. Heart sounds: Normal heart sounds. Pulmonary:      Effort: Pulmonary effort is normal. No respiratory distress. Breath sounds: Normal breath sounds. No wheezing. Chest:      Chest wall: No tenderness. Abdominal:      General: Abdomen is flat. There is no distension. Palpations: Abdomen is soft. Tenderness: There is no abdominal tenderness. There is no guarding or rebound. Musculoskeletal: Normal range of motion. General: Tenderness (Mid left thigh) and deformity (Shortened left leg) present. No swelling or signs of injury. Right lower leg: No edema. Left lower leg: No edema. Skin:     General: Skin is warm and dry. Capillary Refill: Capillary refill takes less than 2 seconds. Neurological:      General: No focal deficit present. Mental Status: She is alert and oriented to person, place, and time. Psychiatric:         Mood and Affect: Mood normal.         Behavior: Behavior normal.          MDM  Number of Diagnoses or Management Options  Diagnosis management comments: 80-year-old female presents as above after fall yesterday with left hip fracture. Discussed with orthopedics, plan for admission to the hospital for operative treatment. Amount and/or Complexity of Data Reviewed  Clinical lab tests: reviewed  Tests in the radiology section of CPT®: reviewed           Procedures          1130: Discussed with orthopedics, Dr. Tae Singh, who recommends admission to the hospitalist for likely operative treatment.        Perfect Serve Consult for Admission  11:34 AM    ED Room Number: ER11/11  Patient Name and age:  [place] 80 y.o.  female  Working Diagnosis:   1. Closed fracture of left hip, initial encounter ([facility]    2.  Fall, initial encounter        COVID-19 Suspicion:  no  Sepsis present:  no  Reassessment needed: N/A  Code Status:  Full Code  Readmission: no  Isolation Requirements:  no  Recommended Level of Care:  med/surg  Department:[facility] Adult ED - (21 [phone]  Other: Hip fracture, likely operative treatment

## 2021-05-07 NOTE — H&P
History & Physical    Primary Care Provider: Jaquan Daniels MD  Source of Information: Patient's son     History of Presenting Illness:   Soniya Booth is a 80 y.o. female who presents with fall   Pt lives at [facility] of our [facility]. Has dementia. Son gave history     70-year-old female history of hypertension, dementia, degenerative joint disease presents to the emergency department for reported fall yesterday. She resides at our [facility] and had mechanical fall yesterday. X-ray performed at the nursing home today concerning for left femur fracture. She denies any other injuries. known to have dementiaShe is reportedly at her baseline mental status of ANO x2.  She only complains of pain during palpation or manipulation of her left leg. She had fall and had right hip fx last year in [facility] and she was s/p RIGHT HIP HEMIARTHROPLASTY. Able to walk with a walker since then. Review of Systems:  General: HPI, no fever, no changes of weight  HEENT: no headache, no vision changes, no nose discharge, no hearing changes   RES: no wheezing, no cough, no sob  CVS: no cp, no palpitation. Muscular: HPI   Skin: no rash, no itching   GI: no vomiting, no diarrhea  : no dysuria, no hematuria  Hemo: no gum bleeding, no petechial   Neuro: no sensation changes, no focal weakness   Endo: no polydipsia   Psych: dementia, but no behavior changes     Past Medical History:   Diagnosis Date    DJD (degenerative joint disease)     Hematuria, microscopic     Hypertension     Memory loss     Menopause     Osteopenia       Past Surgical History:   Procedure Laterality Date    HX HYSTERECTOMY       Prior to Admission medications    Medication Sig Start Date End Date Taking? Authorizing Provider   risperiDONE (RisperDAL) 1 mg tablet Take 1 mg by mouth every evening. Patient takes in the evening at 1700.    Yes Other, MD Melanie   traZODone (DESYREL) 50 mg tablet Take 50 mg by mouth nightly. Yes Other, MD Melanie   aspirin delayed-release 81 mg tablet Take 1 Tab by mouth two (2) times a day. 11/13/20  Yes MOHINDER Chung   risperiDONE (RisperDAL) 0.25 mg tablet Take 0.25 mg by mouth Daily (before lunch). Yes Provider, Historical   loratadine (CLARITIN) 10 mg tablet Take 1 Tab by mouth daily. For allergies. 8/27/20  Yes Emanuel Bruce MD   busPIRone (BUSPAR) 10 mg tablet Take 1 Tab by mouth three (3) times daily. 2/11/20  Yes Emanuel Bruce MD   amLODIPine (NORVASC) 2.5 mg tablet Take 1 Tab by mouth daily. 1/20/20  Yes Emanuel Bruce MD   acetaminophen (TYLENOL) 325 mg tablet Take 650 mg by mouth every four (4) hours as needed for Pain. Yes Provider, Historical   multivitamin (ONE A DAY) tablet Take 1 Tab by mouth daily. Yes Provider, Historical   memantine (NAMENDA) 10 mg tablet TAKE 1 TABLET BY MOUTH TWICE A DAY 5/4/16  Yes Emanuel Bruce MD     Allergies   Allergen Reactions    Cephalexin Nausea Only    Macrobid [Nitrofurantoin Monohyd/M-Cryst] Myalgia    Razadyne [Galantamine] Nausea Only    Sulfa (Sulfonamide Antibiotics) Rash      Family History   Problem Relation Age of Onset    Heart Disease Mother         congestive heart failure    Stroke Father     Alcohol abuse Son     Alcohol abuse Daughter         SOCIAL HISTORY:  Patient resides:  Independently    Assisted Living x   SNF    With family care       Smoking history:   None x   Former    Chronic      Alcohol history:   None x   Social    Chronic      Ambulates:   Independently    w/cane    w/walker x   w/wc    CODE STATUS:  DNR    Full x   Other      Objective:     Physical Exam:     Visit Vitals  BP (!) 173/77   Pulse (!) 104   Temp 98.8 °F (37.1 °C)   Resp 15   SpO2 94%      O2 Device: None (Room air)    General:  Alert, dementia, answer simple questions    Head:  Normocephalic, without obvious abnormality, atraumatic. Eyes:  Conjunctivae/corneas clear. PERRL, EOMs intact.    Nose: Nares normal. Septum midline. Mucosa normal. No drainage or sinus tenderness. Throat: Lips, mucosa, and tongue normal. Teeth and gums normal.   Neck: Supple, symmetrical, trachea midline, no adenopathy, thyroid: no enlargement/tenderness/nodules, no carotid bruit and no JVD. Back:   Symmetric, no curvature. ROM normal. No CVA tenderness. Lungs:   Clear to auscultation bilaterally. Chest wall:  No tenderness or deformity. Heart:  Regular rate and rhythm, S1, S2 normal, no murmur, click, rub or gallop. Abdomen:   Soft, non-tender. Bowel sounds normal. No masses,  No organomegaly. Extremities: Right foot mild drop. Left leg rotated and short    Pulses: 2+ and symmetric all extremities. Skin: Skin color, texture, turgor normal. No rashes or lesions   Neurologic: CNII-XII intact. None fcoal              Data Review:     Recent Days:  Recent Labs     05/07/21  1032   WBC 8.8   HGB 11.2*   HCT 36.1   *     Recent Labs     05/07/21  1032      K 3.4*   *   CO2 25   GLU 95   BUN 12   CREA 0.42*   CA 8.0*     No results for input(s): PH, PCO2, PO2, HCO3, FIO2 in the last 72 hours. 24 Hour Results:  Recent Results (from the past 24 hour(s))   SAMPLES BEING HELD    Collection Time: 05/07/21 10:32 AM   Result Value Ref Range    SAMPLES BEING HELD 1red, 1lav, 1blu, 1pst     COMMENT        Add-on orders for these samples will be processed based on acceptable specimen integrity and analyte stability, which may vary by analyte.    METABOLIC PANEL, BASIC    Collection Time: 05/07/21 10:32 AM   Result Value Ref Range    Sodium 143 136 - 145 mmol/L    Potassium 3.4 (L) 3.5 - 5.1 mmol/L    Chloride 111 (H) 97 - 108 mmol/L    CO2 25 21 - 32 mmol/L    Anion gap 7 5 - 15 mmol/L    Glucose 95 65 - 100 mg/dL    BUN 12 6 - 20 MG/DL    Creatinine 0.42 (L) 0.55 - 1.02 MG/DL    BUN/Creatinine ratio 29 (H) 12 - 20      GFR est AA >60 >60 ml/min/1.73m2    GFR est non-AA >60 >60 ml/min/1.73m2    Calcium 8.0 (L) 8.5 - 10.1 MG/DL   CBC WITH AUTOMATED DIFF    Collection Time: 05/07/21 10:32 AM   Result Value Ref Range    WBC 8.8 3.6 - 11.0 K/uL    RBC 3.66 (L) 3.80 - 5.20 M/uL    HGB 11.2 (L) 11.5 - 16.0 g/dL    HCT 36.1 35.0 - 47.0 %    MCV 98.6 80.0 - 99.0 FL    MCH 30.6 26.0 - 34.0 PG    MCHC 31.0 30.0 - 36.5 g/dL    RDW 13.7 11.5 - 14.5 %    PLATELET 415 (L) 131 - 400 K/uL    MPV 10.1 8.9 - 12.9 FL    NRBC 0.0 0  WBC    ABSOLUTE NRBC 0.00 0.00 - 0.01 K/uL    NEUTROPHILS 85 (H) 32 - 75 %    LYMPHOCYTES 5 (L) 12 - 49 %    MONOCYTES 9 5 - 13 %    EOSINOPHILS 0 0 - 7 %    BASOPHILS 0 0 - 1 %    IMMATURE GRANULOCYTES 1 (H) 0.0 - 0.5 %    ABS. NEUTROPHILS 7.5 1.8 - 8.0 K/UL    ABS. LYMPHOCYTES 0.4 (L) 0.8 - 3.5 K/UL    ABS. MONOCYTES 0.8 0.0 - 1.0 K/UL    ABS. EOSINOPHILS 0.0 0.0 - 0.4 K/UL    ABS. BASOPHILS 0.0 0.0 - 0.1 K/UL    ABS. IMM. GRANS. 0.1 (H) 0.00 - 0.04 K/UL    DF AUTOMATED      RBC COMMENTS NORMOCYTIC, NORMOCHROMIC     COVID-19 RAPID TEST    Collection Time: 05/07/21 11:55 AM   Result Value Ref Range    Specimen source Nasopharyngeal      COVID-19 rapid test Not detected NOTD           Imaging:   Xr Femur Lt 2 V    Result Date: 5/7/2021  Left femoral neck fracture. Assessment:     Active Problems:    Hip fracture ([address] (5/7/2021)           Plan:     1. Left femoral neck fx: ortho consulted, likely OR in am. NPO after midnight. atc tyelnol. Prn tramadol. Cautious of narcotics, may give very small dose morphine if in severe pain. 2. Dementia: risk of acute delirium, prn haldol if agitated. 3. Will check EKG pre-op.         Signed By: Dariusz Petersen MD     May 7, 2021

## 2021-05-07 NOTE — CONSULTS
ORTHOPEDIC CONSULT NOTE    Subjective:     Date of Consultation:  May 7, 2021  Referring Physician:  Celia Au MD    Chase Lyons is a 80 y.o. female who is being seen for left hip pain. Patient is a resident of Our [address] [place] [address]. She suffered a GLF yesterday at the facility and was unable to ambulate following the fall. She wasn't in a significant amount of pain per reports and son (at bedside) so XR was not rushed. However, she was transported today after receiving a portable XR while at her facility due to fracture of the left hip. The patient is pleasantly confused, lying in bed but is trying to remove devices and lines connected to her. She stops with redirecting. She was ambulating well on her own with and without a walker prior to her fall. Of note, she underwent a right hip hemiarthroplasty 11/2020 with Dr. Kell Valdes following a fall with subsequent hip fracture. She has been NPO since yesterday per son.        Patient Active Problem List    Diagnosis Date Noted    Femoral neck fracture ([facility] 11/10/2020    Femur fracture ([address] 11/09/2020    Moderate dementia with behavioral disturbance ([facility] 02/20/2018    Primary insomnia 02/20/2018    Advance directive discussed with patient 03/13/2017    Other abnormal glucose 04/06/2012    Encounter for long-term (current) use of other medications 04/06/2012    Unspecified disorder of kidney and ureter 02/21/2011    Essential hypertension, benign 11/23/2009    Nausea alone 11/23/2009    Memory loss 11/23/2009    Symptomatic menopausal or female climacteric states 11/23/2009    Arthropathy, unspecified, site unspecified 11/23/2009    Disorder of bone and cartilage, unspecified 11/23/2009       Family History   Problem Relation Age of Onset    Heart Disease Mother         congestive heart failure    Stroke Father     Alcohol abuse Son     Alcohol abuse Daughter       Social History     Tobacco Use    Smoking status: Never Smoker    Smokeless tobacco: Never Used   Substance Use Topics    Alcohol use: No     Alcohol/week: 0.0 standard drinks     Past Medical History:   Diagnosis Date    DJD (degenerative joint disease)     Hematuria, microscopic     Hypertension     Memory loss     Menopause     Osteopenia       Past Surgical History:   Procedure Laterality Date    HX HYSTERECTOMY        Prior to Admission medications    Medication Sig Start Date End Date Taking? Authorizing Provider   aspirin delayed-release 81 mg tablet Take 1 Tab by mouth two (2) times a day. 11/13/20   MOHINDER Nichols   risperiDONE (RisperDAL) 0.25 mg tablet Take 0.25 mg by mouth Daily (before lunch). Provider, Historical   loratadine (CLARITIN) 10 mg tablet Take 1 Tab by mouth daily. For allergies. 8/27/20   Rick Bruce MD   busPIRone (BUSPAR) 10 mg tablet Take 1 Tab by mouth three (3) times daily. 2/11/20   Rick Bruce MD   amLODIPine (NORVASC) 2.5 mg tablet Take 1 Tab by mouth daily. 1/20/20   Rick Bruce MD   potassium chloride SR (KLOR-CON 8) 8 mEq tablet TAKE 1 TABLET BY MOUTH EVERY DAY 7/13/19   Rick Bruce MD   hydroCHLOROthiazide (MICROZIDE) 12.5 mg capsule TAKE ONE CAPSULE BY MOUTH EVERY MORNING 10/2/18   Rick Bruce MD   acetaminophen (TYLENOL) 325 mg tablet Take  by mouth every four (4) hours as needed for Pain. Provider, Historical   QUEtiapine (SEROQUEL) 25 mg tablet Take 0.5 Tabs by mouth nightly. 5/11/18   Rick Bruce MD   multivitamin (ONE A DAY) tablet Take 1 Tab by mouth daily. Provider, Historical   memantine (NAMENDA) 10 mg tablet TAKE 1 TABLET BY MOUTH TWICE A DAY 5/4/16   Rick Bruce MD     No current facility-administered medications for this encounter. Current Outpatient Medications   Medication Sig    aspirin delayed-release 81 mg tablet Take 1 Tab by mouth two (2) times a day.     risperiDONE (RisperDAL) 0.25 mg tablet Take 0.25 mg by mouth Daily (before lunch).  loratadine (CLARITIN) 10 mg tablet Take 1 Tab by mouth daily. For allergies.  busPIRone (BUSPAR) 10 mg tablet Take 1 Tab by mouth three (3) times daily.  amLODIPine (NORVASC) 2.5 mg tablet Take 1 Tab by mouth daily.  potassium chloride SR (KLOR-CON 8) 8 mEq tablet TAKE 1 TABLET BY MOUTH EVERY DAY    hydroCHLOROthiazide (MICROZIDE) 12.5 mg capsule TAKE ONE CAPSULE BY MOUTH EVERY MORNING    acetaminophen (TYLENOL) 325 mg tablet Take  by mouth every four (4) hours as needed for Pain.  QUEtiapine (SEROQUEL) 25 mg tablet Take 0.5 Tabs by mouth nightly.  multivitamin (ONE A DAY) tablet Take 1 Tab by mouth daily.  memantine (NAMENDA) 10 mg tablet TAKE 1 TABLET BY MOUTH TWICE A DAY      Allergies   Allergen Reactions    Cephalexin Nausea Only    Macrobid [Nitrofurantoin Monohyd/M-Cryst] Myalgia    Razadyne [Galantamine] Nausea Only    Sulfa (Sulfonamide Antibiotics) Rash        Review of Systems:  A comprehensive review of systems was negative except for that written in the HPI. Mental Status: Dementia    Objective:     Patient Vitals for the past 8 hrs:   BP Temp Pulse Resp SpO2   21 1100 (!) 165/115  96 14 99 %   21 1015 (!) 152/56 98.8 °F (37.1 °C) 94 14 97 %     Temp (24hrs), Av.8 °F (37.1 °C), Min:98.8 °F (37.1 °C), Max:98.8 °F (37.1 °C)        ORTHO EXAM: Pleasantly confused, cooperative, no distress, appears stated age  LLE MSK: Left hip without abrasions. Bony tenderness to the left anterior and lateral hip. Tenderness on palpation at the left groin. No obvious deformity at the hip noted. Left leg is shortened with slight external rotation. Sensation to light touch intact to the medial/lat/posterior aspects of the left foot. Compartments of the thigh and calf are soft and compressible, without pain. Able to wiggle toes. Passive flexion of great toe without pain. 4/5 ankle dorsiflexion and plantar flexion due to pain.  Pain with minimal passive internal/external rotation of the hip. 2+ DP pulses. Cap refill brisk. .    IMAGING REVIEW:  EXAM: XR FEMUR LT 2 V     INDICATION: Status post fall with left hip pain.     COMPARISON: None.     FINDINGS: Two views of the left femur demonstrate a left femoral neck fracture  with medial angulation and slight superior displacement of the distal femoral  fracture fragment. Vascular calcification is noted.     IMPRESSION  Left femoral neck fracture. Labs:   Recent Results (from the past 24 hour(s))   SAMPLES BEING HELD    Collection Time: 05/07/21 10:32 AM   Result Value Ref Range    SAMPLES BEING HELD 1red, 1lav, 1blu, 1pst     COMMENT        Add-on orders for these samples will be processed based on acceptable specimen integrity and analyte stability, which may vary by analyte. Impression:     Patient Active Problem List    Diagnosis Date Noted    Femoral neck fracture ([facility] 11/10/2020    Femur fracture ([address] 11/09/2020    Moderate dementia with behavioral disturbance ([facility] 02/20/2018    Primary insomnia 02/20/2018    Advance directive discussed with patient 03/13/2017    Other abnormal glucose 04/06/2012    Encounter for long-term (current) use of other medications 04/06/2012    Unspecified disorder of kidney and ureter 02/21/2011    Essential hypertension, benign 11/23/2009    Nausea alone 11/23/2009    Memory loss 11/23/2009    Symptomatic menopausal or female climacteric states 11/23/2009    Arthropathy, unspecified, site unspecified 11/23/2009    Disorder of bone and cartilage, unspecified 11/23/2009       Active Problems:    * No active hospital problems. *        ASSESSMENT:   Left subcapital femoral neck fracture    Plan:   ORTHOPEDIC PLAN:  1. D/w Dr. Marek Delgadillo  2. Ortho plan: D/w the patient's son. Plan is to take the pt to the OR for a left hip hemiarthroplasty with Dr. Marek Delgadillo on 5/8/2021. She will need to be medically cleared prior to surgery. Hospitalist team to admit.  EKG, CXR, rapid COVID, labs, NPO orders placed. Patient has been consented by Ghanshyambriana, fei. 3. DVT ppx: Lovenox post op  4. Pain control: per admitting team, currently adequate  5.  Activity: Bedrest      [place] and [facility], [address] [facility]

## 2021-05-07 NOTE — PROGRESS NOTES
TRANSFER - IN REPORT:    Verbal report received from COLLIN Truong (name) on Antonio Bernarda  being received from ED (unit) for routine progression of care      Report consisted of patients Situation, Background, Assessment and   Recommendations(SBAR). Information from the following report(s) SBAR, ED Summary, [facility] and Recent Results was reviewed with the receiving nurse. Opportunity for questions and clarification was provided. Assessment completed upon patients arrival to unit and care assumed.

## 2021-05-07 NOTE — SENIOR SERVICES NOTE
TRST 3, SSED Visit. Chart reviewed. Patient greeted in room, Cierra Gallegos (son) at the bedside, patient resting. We discussed where patient is currently living and medication intake. Last rcvd medication at midnight and last evening. Home:  [facility] #[phone], ext. [phone] [facility] called [address] [place] [address] and spoke with Alex Garcia LPN and was able to perform a Medication Reconciliation.   -All medications updated in the chart accordingly. Patient to be admitted for Orthopedic intervention.     Jaclyn High [address], NP  SSED  12:00 PM  [phone]

## 2021-05-07 NOTE — ED TRIAGE NOTES
Pt arrived from [address] with cc of L hip pain post GLF. Fall was unwitnessed with unknown LOC. Pt is ambulatory at baseline, A&O X 2 at baseline. PMHx of dementia and Htn. SBPs in the 180s for EMS. L leg shortened and rotated externally. XR from facility showed fracture of the L proximal femur.

## 2021-05-07 NOTE — ROUTINE PROCESS
TRANSFER - OUT REPORT: 
 
Verbal report given to Pura Puentes RN (name) on Damianwsilviano Axon  being transferred to  (unit) for routine progression of care Report consisted of patients Situation, Background, Assessment and  
Recommendations(SBAR). Information from the following report(s) SBAR, Kardex, ED Summary, [facility] and Recent Results was reviewed with the receiving nurse. Lines:  
Peripheral IV 05/07/21 Left Antecubital (Active) Site Assessment Clean, dry, & intact 05/07/21 1032 Phlebitis Assessment 0 05/07/21 1032 Infiltration Assessment 0 05/07/21 1032 Dressing Status Clean, dry, & intact 05/07/21 1032 Action Taken Blood drawn 05/07/21 1032 Opportunity for questions and clarification was provided. Patient transported with: 
 [employer]

## 2021-05-07 NOTE — ROUTINE PROCESS
TRANSFER - IN REPORT: 
 
Verbal report received from [address] [place] [address] RN(name) on Jo-Ann Valle  being received from ED(unit) for ordered procedure Report consisted of patients Situation, Background, Assessment and  
Recommendations(SBAR). Information from the following report(s) SBAR, Kardex, ED Summary, Procedure Summary, Intake/Output, MAR and Recent Results was reviewed with the receiving nurse. Opportunity for questions and clarification was provided. Assessment completed upon patients arrival to unit and care assumed.

## 2021-05-08 ENCOUNTER — ANESTHESIA (OUTPATIENT)
Dept: SURGERY | Age: 86
DRG: 522 | End: 2021-05-08
Payer: MEDICARE

## 2021-05-08 ENCOUNTER — APPOINTMENT (OUTPATIENT)
Dept: GENERAL RADIOLOGY | Age: 86
DRG: 522 | End: 2021-05-08
Attending: INTERNAL MEDICINE
Payer: MEDICARE

## 2021-05-08 ENCOUNTER — APPOINTMENT (OUTPATIENT)
Dept: GENERAL RADIOLOGY | Age: 86
DRG: 522 | End: 2021-05-08
Attending: STUDENT IN AN ORGANIZED HEALTH CARE EDUCATION/TRAINING PROGRAM
Payer: MEDICARE

## 2021-05-08 ENCOUNTER — ANESTHESIA EVENT (OUTPATIENT)
Dept: SURGERY | Age: 86
DRG: 522 | End: 2021-05-08
Payer: MEDICARE

## 2021-05-08 LAB
ALBUMIN SERPL-MCNC: 3.1 G/DL (ref 3.5–5)
ALBUMIN/GLOB SERPL: 0.9 {RATIO} (ref 1.1–2.2)
ALP SERPL-CCNC: 71 U/L (ref 45–117)
ALT SERPL-CCNC: 20 U/L (ref 12–78)
ANION GAP SERPL CALC-SCNC: 4 MMOL/L (ref 5–15)
AST SERPL-CCNC: 17 U/L (ref 15–37)
BASOPHILS # BLD: 0.1 K/UL (ref 0–0.1)
BASOPHILS NFR BLD: 1 % (ref 0–1)
BILIRUB SERPL-MCNC: 0.7 MG/DL (ref 0.2–1)
BUN SERPL-MCNC: 13 MG/DL (ref 6–20)
BUN/CREAT SERPL: 24 (ref 12–20)
CALCIUM SERPL-MCNC: 8.8 MG/DL (ref 8.5–10.1)
CHLORIDE SERPL-SCNC: 106 MMOL/L (ref 97–108)
CO2 SERPL-SCNC: 27 MMOL/L (ref 21–32)
CREAT SERPL-MCNC: 0.54 MG/DL (ref 0.55–1.02)
DIFFERENTIAL METHOD BLD: ABNORMAL
EOSINOPHIL # BLD: 0.1 K/UL (ref 0–0.4)
EOSINOPHIL NFR BLD: 1 % (ref 0–7)
ERYTHROCYTE [DISTWIDTH] IN BLOOD BY AUTOMATED COUNT: 13.8 % (ref 11.5–14.5)
GLOBULIN SER CALC-MCNC: 3.3 G/DL (ref 2–4)
GLUCOSE SERPL-MCNC: 108 MG/DL (ref 65–100)
HCT VFR BLD AUTO: 41.5 % (ref 35–47)
HGB BLD-MCNC: 13.2 G/DL (ref 11.5–16)
IMM GRANULOCYTES # BLD AUTO: 0.1 K/UL (ref 0–0.04)
IMM GRANULOCYTES NFR BLD AUTO: 1 % (ref 0–0.5)
LYMPHOCYTES # BLD: 0.8 K/UL (ref 0.8–3.5)
LYMPHOCYTES NFR BLD: 8 % (ref 12–49)
MAGNESIUM SERPL-MCNC: 2 MG/DL (ref 1.6–2.4)
MCH RBC QN AUTO: 30.2 PG (ref 26–34)
MCHC RBC AUTO-ENTMCNC: 31.8 G/DL (ref 30–36.5)
MCV RBC AUTO: 95 FL (ref 80–99)
MONOCYTES # BLD: 1.3 K/UL (ref 0–1)
MONOCYTES NFR BLD: 13 % (ref 5–13)
NEUTS SEG # BLD: 7.5 K/UL (ref 1.8–8)
NEUTS SEG NFR BLD: 76 % (ref 32–75)
NRBC # BLD: 0 K/UL (ref 0–0.01)
NRBC BLD-RTO: 0 PER 100 WBC
PHOSPHATE SERPL-MCNC: 2.8 MG/DL (ref 2.6–4.7)
PLATELET # BLD AUTO: 183 K/UL (ref 150–400)
PMV BLD AUTO: 10.1 FL (ref 8.9–12.9)
POTASSIUM SERPL-SCNC: 3.7 MMOL/L (ref 3.5–5.1)
PROT SERPL-MCNC: 6.4 G/DL (ref 6.4–8.2)
RBC # BLD AUTO: 4.37 M/UL (ref 3.8–5.2)
RBC MORPH BLD: ABNORMAL
SODIUM SERPL-SCNC: 137 MMOL/L (ref 136–145)
WBC # BLD AUTO: 9.9 K/UL (ref 3.6–11)

## 2021-05-08 PROCEDURE — 77030040241 HC ABD PLLW HIP MDII -B: Performed by: INTERNAL MEDICINE

## 2021-05-08 PROCEDURE — 77030008684 HC TU ET CUF COVD -B: Performed by: ANESTHESIOLOGY

## 2021-05-08 PROCEDURE — C1776 JOINT DEVICE (IMPLANTABLE): HCPCS | Performed by: INTERNAL MEDICINE

## 2021-05-08 PROCEDURE — 77030006784 HC BLD SAW OSC MCRA -B: Performed by: INTERNAL MEDICINE

## 2021-05-08 PROCEDURE — 2709999900 HC NON-CHARGEABLE SUPPLY: Performed by: INTERNAL MEDICINE

## 2021-05-08 PROCEDURE — 74011250637 HC RX REV CODE- 250/637: Performed by: ANESTHESIOLOGY

## 2021-05-08 PROCEDURE — 80053 COMPREHEN METABOLIC PANEL: CPT

## 2021-05-08 PROCEDURE — 76210000016 HC OR PH I REC 1 TO 1.5 HR: Performed by: INTERNAL MEDICINE

## 2021-05-08 PROCEDURE — 77030018831 HC SOL IRR H20 BAXT -A: Performed by: INTERNAL MEDICINE

## 2021-05-08 PROCEDURE — 84100 ASSAY OF PHOSPHORUS: CPT

## 2021-05-08 PROCEDURE — 74011250636 HC RX REV CODE- 250/636: Performed by: INTERNAL MEDICINE

## 2021-05-08 PROCEDURE — 74011000250 HC RX REV CODE- 250: Performed by: NURSE ANESTHETIST, CERTIFIED REGISTERED

## 2021-05-08 PROCEDURE — 74011250637 HC RX REV CODE- 250/637: Performed by: HOSPITALIST

## 2021-05-08 PROCEDURE — 77030008462 HC STPLR SKN PROX J&J -A: Performed by: INTERNAL MEDICINE

## 2021-05-08 PROCEDURE — 74011000250 HC RX REV CODE- 250: Performed by: INTERNAL MEDICINE

## 2021-05-08 PROCEDURE — 74011250636 HC RX REV CODE- 250/636: Performed by: NURSE ANESTHETIST, CERTIFIED REGISTERED

## 2021-05-08 PROCEDURE — 73501 X-RAY EXAM HIP UNI 1 VIEW: CPT

## 2021-05-08 PROCEDURE — 77030031139 HC SUT VCRL2 J&J -A: Performed by: INTERNAL MEDICINE

## 2021-05-08 PROCEDURE — 83735 ASSAY OF MAGNESIUM: CPT

## 2021-05-08 PROCEDURE — 74011250636 HC RX REV CODE- 250/636: Performed by: PHYSICIAN ASSISTANT

## 2021-05-08 PROCEDURE — 77030018673: Performed by: INTERNAL MEDICINE

## 2021-05-08 PROCEDURE — 0SRS0J9 REPLACEMENT OF LEFT HIP JOINT, FEMORAL SURFACE WITH SYNTHETIC SUBSTITUTE, CEMENTED, OPEN APPROACH: ICD-10-PCS | Performed by: INTERNAL MEDICINE

## 2021-05-08 PROCEDURE — 36415 COLL VENOUS BLD VENIPUNCTURE: CPT

## 2021-05-08 PROCEDURE — 74011000250 HC RX REV CODE- 250: Performed by: PHYSICIAN ASSISTANT

## 2021-05-08 PROCEDURE — 76060000035 HC ANESTHESIA 2 TO 2.5 HR: Performed by: INTERNAL MEDICINE

## 2021-05-08 PROCEDURE — 85025 COMPLETE CBC W/AUTO DIFF WBC: CPT

## 2021-05-08 PROCEDURE — 77030026438 HC STYL ET INTUB CARD -A: Performed by: ANESTHESIOLOGY

## 2021-05-08 PROCEDURE — 76010000131 HC OR TIME 2 TO 2.5 HR: Performed by: INTERNAL MEDICINE

## 2021-05-08 PROCEDURE — C1713 ANCHOR/SCREW BN/BN,TIS/BN: HCPCS | Performed by: INTERNAL MEDICINE

## 2021-05-08 PROCEDURE — 65270000029 HC RM PRIVATE

## 2021-05-08 PROCEDURE — 72170 X-RAY EXAM OF PELVIS: CPT

## 2021-05-08 PROCEDURE — 77030018547 HC SUT ETHBND1 J&J -B: Performed by: INTERNAL MEDICINE

## 2021-05-08 PROCEDURE — 77030039398 HC MIX CEM PRSM J&J -C: Performed by: INTERNAL MEDICINE

## 2021-05-08 PROCEDURE — 74011250637 HC RX REV CODE- 250/637: Performed by: INTERNAL MEDICINE

## 2021-05-08 DEVICE — HIP H4 HEMI UNI BIPLR IMPL CAPPED H4: Type: IMPLANTABLE DEVICE | Site: HIP | Status: FUNCTIONAL

## 2021-05-08 DEVICE — SMARTSET HIGH PERFORMANCE MV MEDIUM VISCOSITY BONE CEMENT 40G
Type: IMPLANTABLE DEVICE | Site: HIP | Status: FUNCTIONAL
Brand: SMARTSET

## 2021-05-08 DEVICE — SELF CENTERING BI-POLAR HEAD 28MM ID 45MM OD
Type: IMPLANTABLE DEVICE | Site: HIP | Status: FUNCTIONAL
Brand: SELF CENTERING

## 2021-05-08 DEVICE — SUMMIT FEMORAL STEM 12/14 TAPER CEMENTED SIZE 4 STD 114MM
Type: IMPLANTABLE DEVICE | Site: HIP | Status: FUNCTIONAL
Brand: SUMMIT

## 2021-05-08 DEVICE — ARTICUL/EZE FEMORAL HEAD DIAMETER 28MM +5 12/14 TAPER
Type: IMPLANTABLE DEVICE | Site: HIP | Status: FUNCTIONAL
Brand: ARTICUL/EZE

## 2021-05-08 DEVICE — CEMENTRALIZER STEM CENTRALIZER 10.0MM CEMENTED
Type: IMPLANTABLE DEVICE | Site: HIP | Status: FUNCTIONAL
Brand: CEMENTRALIZER

## 2021-05-08 RX ORDER — MORPHINE SULFATE 2 MG/ML
2 INJECTION, SOLUTION INTRAMUSCULAR; INTRAVENOUS
Status: DISCONTINUED | OUTPATIENT
Start: 2021-05-08 | End: 2021-05-08 | Stop reason: HOSPADM

## 2021-05-08 RX ORDER — PHENYLEPHRINE HCL IN 0.9% NACL 0.4MG/10ML
SYRINGE (ML) INTRAVENOUS AS NEEDED
Status: DISCONTINUED | OUTPATIENT
Start: 2021-05-08 | End: 2021-05-08 | Stop reason: HOSPADM

## 2021-05-08 RX ORDER — SODIUM CHLORIDE 0.9 % (FLUSH) 0.9 %
5-40 SYRINGE (ML) INJECTION EVERY 8 HOURS
Status: DISCONTINUED | OUTPATIENT
Start: 2021-05-08 | End: 2021-05-12 | Stop reason: HOSPADM

## 2021-05-08 RX ORDER — NEOSTIGMINE METHYLSULFATE 1 MG/ML
INJECTION, SOLUTION INTRAVENOUS AS NEEDED
Status: DISCONTINUED | OUTPATIENT
Start: 2021-05-08 | End: 2021-05-08 | Stop reason: HOSPADM

## 2021-05-08 RX ORDER — ROCURONIUM BROMIDE 10 MG/ML
INJECTION, SOLUTION INTRAVENOUS AS NEEDED
Status: DISCONTINUED | OUTPATIENT
Start: 2021-05-08 | End: 2021-05-08 | Stop reason: HOSPADM

## 2021-05-08 RX ORDER — EPHEDRINE SULFATE/0.9% NACL/PF 50 MG/5 ML
SYRINGE (ML) INTRAVENOUS AS NEEDED
Status: DISCONTINUED | OUTPATIENT
Start: 2021-05-08 | End: 2021-05-08 | Stop reason: HOSPADM

## 2021-05-08 RX ORDER — SUCCINYLCHOLINE CHLORIDE 20 MG/ML
INJECTION INTRAMUSCULAR; INTRAVENOUS AS NEEDED
Status: DISCONTINUED | OUTPATIENT
Start: 2021-05-08 | End: 2021-05-08 | Stop reason: HOSPADM

## 2021-05-08 RX ORDER — FENTANYL CITRATE 50 UG/ML
25 INJECTION, SOLUTION INTRAMUSCULAR; INTRAVENOUS
Status: DISCONTINUED | OUTPATIENT
Start: 2021-05-08 | End: 2021-05-08 | Stop reason: HOSPADM

## 2021-05-08 RX ORDER — OXYCODONE HYDROCHLORIDE 5 MG/1
5 TABLET ORAL AS NEEDED
Status: DISCONTINUED | OUTPATIENT
Start: 2021-05-08 | End: 2021-05-08 | Stop reason: HOSPADM

## 2021-05-08 RX ORDER — KETAMINE HYDROCHLORIDE 10 MG/ML
INJECTION, SOLUTION INTRAMUSCULAR; INTRAVENOUS AS NEEDED
Status: DISCONTINUED | OUTPATIENT
Start: 2021-05-08 | End: 2021-05-08 | Stop reason: HOSPADM

## 2021-05-08 RX ORDER — EPHEDRINE SULFATE/0.9% NACL/PF 50 MG/5 ML
5 SYRINGE (ML) INTRAVENOUS AS NEEDED
Status: DISCONTINUED | OUTPATIENT
Start: 2021-05-08 | End: 2021-05-08 | Stop reason: HOSPADM

## 2021-05-08 RX ORDER — SODIUM CHLORIDE 9 MG/ML
25 INJECTION, SOLUTION INTRAVENOUS CONTINUOUS
Status: DISCONTINUED | OUTPATIENT
Start: 2021-05-08 | End: 2021-05-08 | Stop reason: HOSPADM

## 2021-05-08 RX ORDER — AMOXICILLIN 250 MG
1 CAPSULE ORAL 2 TIMES DAILY
Status: DISCONTINUED | OUTPATIENT
Start: 2021-05-08 | End: 2021-05-12 | Stop reason: HOSPADM

## 2021-05-08 RX ORDER — MORPHINE SULFATE 2 MG/ML
INJECTION, SOLUTION INTRAMUSCULAR; INTRAVENOUS AS NEEDED
Status: DISCONTINUED | OUTPATIENT
Start: 2021-05-08 | End: 2021-05-08 | Stop reason: HOSPADM

## 2021-05-08 RX ORDER — NALOXONE HYDROCHLORIDE 0.4 MG/ML
0.4 INJECTION, SOLUTION INTRAMUSCULAR; INTRAVENOUS; SUBCUTANEOUS AS NEEDED
Status: DISCONTINUED | OUTPATIENT
Start: 2021-05-08 | End: 2021-05-12 | Stop reason: HOSPADM

## 2021-05-08 RX ORDER — MIDAZOLAM HYDROCHLORIDE 1 MG/ML
1 INJECTION, SOLUTION INTRAMUSCULAR; INTRAVENOUS AS NEEDED
Status: DISCONTINUED | OUTPATIENT
Start: 2021-05-08 | End: 2021-05-08 | Stop reason: HOSPADM

## 2021-05-08 RX ORDER — SODIUM CHLORIDE, SODIUM LACTATE, POTASSIUM CHLORIDE, CALCIUM CHLORIDE 600; 310; 30; 20 MG/100ML; MG/100ML; MG/100ML; MG/100ML
1000 INJECTION, SOLUTION INTRAVENOUS CONTINUOUS
Status: DISCONTINUED | OUTPATIENT
Start: 2021-05-08 | End: 2021-05-08 | Stop reason: HOSPADM

## 2021-05-08 RX ORDER — ACETAMINOPHEN 325 MG/1
650 TABLET ORAL ONCE
Status: COMPLETED | OUTPATIENT
Start: 2021-05-08 | End: 2021-05-08

## 2021-05-08 RX ORDER — SODIUM CHLORIDE, SODIUM LACTATE, POTASSIUM CHLORIDE, CALCIUM CHLORIDE 600; 310; 30; 20 MG/100ML; MG/100ML; MG/100ML; MG/100ML
INJECTION, SOLUTION INTRAVENOUS
Status: DISCONTINUED | OUTPATIENT
Start: 2021-05-08 | End: 2021-05-08 | Stop reason: HOSPADM

## 2021-05-08 RX ORDER — ROPIVACAINE HYDROCHLORIDE 5 MG/ML
150 INJECTION, SOLUTION EPIDURAL; INFILTRATION; PERINEURAL AS NEEDED
Status: DISCONTINUED | OUTPATIENT
Start: 2021-05-08 | End: 2021-05-08 | Stop reason: HOSPADM

## 2021-05-08 RX ORDER — HYDROMORPHONE HYDROCHLORIDE 1 MG/ML
0.2 INJECTION, SOLUTION INTRAMUSCULAR; INTRAVENOUS; SUBCUTANEOUS
Status: ACTIVE | OUTPATIENT
Start: 2021-05-08 | End: 2021-05-08

## 2021-05-08 RX ORDER — MIDAZOLAM HYDROCHLORIDE 1 MG/ML
0.5 INJECTION, SOLUTION INTRAMUSCULAR; INTRAVENOUS
Status: DISCONTINUED | OUTPATIENT
Start: 2021-05-08 | End: 2021-05-08 | Stop reason: HOSPADM

## 2021-05-08 RX ORDER — SODIUM CHLORIDE, SODIUM LACTATE, POTASSIUM CHLORIDE, CALCIUM CHLORIDE 600; 310; 30; 20 MG/100ML; MG/100ML; MG/100ML; MG/100ML
100 INJECTION, SOLUTION INTRAVENOUS CONTINUOUS
Status: DISCONTINUED | OUTPATIENT
Start: 2021-05-08 | End: 2021-05-08 | Stop reason: HOSPADM

## 2021-05-08 RX ORDER — FENTANYL CITRATE 50 UG/ML
50 INJECTION, SOLUTION INTRAMUSCULAR; INTRAVENOUS AS NEEDED
Status: DISCONTINUED | OUTPATIENT
Start: 2021-05-08 | End: 2021-05-08 | Stop reason: HOSPADM

## 2021-05-08 RX ORDER — TRANEXAMIC ACID 100 MG/ML
INJECTION, SOLUTION INTRAVENOUS AS NEEDED
Status: DISCONTINUED | OUTPATIENT
Start: 2021-05-08 | End: 2021-05-08 | Stop reason: HOSPADM

## 2021-05-08 RX ORDER — ONDANSETRON 2 MG/ML
INJECTION INTRAMUSCULAR; INTRAVENOUS AS NEEDED
Status: DISCONTINUED | OUTPATIENT
Start: 2021-05-08 | End: 2021-05-08 | Stop reason: HOSPADM

## 2021-05-08 RX ORDER — ONDANSETRON 2 MG/ML
4 INJECTION INTRAMUSCULAR; INTRAVENOUS AS NEEDED
Status: DISCONTINUED | OUTPATIENT
Start: 2021-05-08 | End: 2021-05-08 | Stop reason: HOSPADM

## 2021-05-08 RX ORDER — LIDOCAINE HYDROCHLORIDE 20 MG/ML
INJECTION, SOLUTION EPIDURAL; INFILTRATION; INTRACAUDAL; PERINEURAL AS NEEDED
Status: DISCONTINUED | OUTPATIENT
Start: 2021-05-08 | End: 2021-05-08 | Stop reason: HOSPADM

## 2021-05-08 RX ORDER — DEXAMETHASONE SODIUM PHOSPHATE 4 MG/ML
INJECTION, SOLUTION INTRA-ARTICULAR; INTRALESIONAL; INTRAMUSCULAR; INTRAVENOUS; SOFT TISSUE AS NEEDED
Status: DISCONTINUED | OUTPATIENT
Start: 2021-05-08 | End: 2021-05-08 | Stop reason: HOSPADM

## 2021-05-08 RX ORDER — DIPHENHYDRAMINE HYDROCHLORIDE 50 MG/ML
12.5 INJECTION, SOLUTION INTRAMUSCULAR; INTRAVENOUS AS NEEDED
Status: DISCONTINUED | OUTPATIENT
Start: 2021-05-08 | End: 2021-05-08 | Stop reason: HOSPADM

## 2021-05-08 RX ORDER — SODIUM CHLORIDE 0.9 % (FLUSH) 0.9 %
5-40 SYRINGE (ML) INJECTION AS NEEDED
Status: DISCONTINUED | OUTPATIENT
Start: 2021-05-08 | End: 2021-05-12 | Stop reason: HOSPADM

## 2021-05-08 RX ORDER — SODIUM CHLORIDE 9 MG/ML
125 INJECTION, SOLUTION INTRAVENOUS CONTINUOUS
Status: DISPENSED | OUTPATIENT
Start: 2021-05-08 | End: 2021-05-09

## 2021-05-08 RX ORDER — PROPOFOL 10 MG/ML
INJECTION, EMULSION INTRAVENOUS AS NEEDED
Status: DISCONTINUED | OUTPATIENT
Start: 2021-05-08 | End: 2021-05-08 | Stop reason: HOSPADM

## 2021-05-08 RX ORDER — FENTANYL CITRATE 50 UG/ML
INJECTION, SOLUTION INTRAMUSCULAR; INTRAVENOUS AS NEEDED
Status: DISCONTINUED | OUTPATIENT
Start: 2021-05-08 | End: 2021-05-08 | Stop reason: HOSPADM

## 2021-05-08 RX ORDER — POLYETHYLENE GLYCOL 3350 17 G/17G
17 POWDER, FOR SOLUTION ORAL DAILY
Status: DISCONTINUED | OUTPATIENT
Start: 2021-05-09 | End: 2021-05-12 | Stop reason: HOSPADM

## 2021-05-08 RX ORDER — GLYCOPYRROLATE 0.2 MG/ML
INJECTION INTRAMUSCULAR; INTRAVENOUS AS NEEDED
Status: DISCONTINUED | OUTPATIENT
Start: 2021-05-08 | End: 2021-05-08 | Stop reason: HOSPADM

## 2021-05-08 RX ORDER — FACIAL-BODY WIPES
10 EACH TOPICAL DAILY PRN
Status: DISCONTINUED | OUTPATIENT
Start: 2021-05-10 | End: 2021-05-12 | Stop reason: HOSPADM

## 2021-05-08 RX ORDER — LIDOCAINE HYDROCHLORIDE 10 MG/ML
0.1 INJECTION, SOLUTION EPIDURAL; INFILTRATION; INTRACAUDAL; PERINEURAL AS NEEDED
Status: DISCONTINUED | OUTPATIENT
Start: 2021-05-08 | End: 2021-05-08 | Stop reason: HOSPADM

## 2021-05-08 RX ADMIN — ACETAMINOPHEN 650 MG: 325 TABLET ORAL at 21:45

## 2021-05-08 RX ADMIN — SODIUM CHLORIDE, POTASSIUM CHLORIDE, SODIUM LACTATE AND CALCIUM CHLORIDE: 600; 310; 30; 20 INJECTION, SOLUTION INTRAVENOUS at 09:57

## 2021-05-08 RX ADMIN — Medication 120 MCG: at 10:35

## 2021-05-08 RX ADMIN — MEMANTINE HYDROCHLORIDE 10 MG: 10 TABLET ORAL at 18:41

## 2021-05-08 RX ADMIN — GLYCOPYRROLATE 0.4 MG: 0.2 INJECTION, SOLUTION INTRAMUSCULAR; INTRAVENOUS at 12:27

## 2021-05-08 RX ADMIN — ROCURONIUM BROMIDE 35 MG: 10 SOLUTION INTRAVENOUS at 10:39

## 2021-05-08 RX ADMIN — ONDANSETRON HYDROCHLORIDE 4 MG: 2 INJECTION, SOLUTION INTRAMUSCULAR; INTRAVENOUS at 10:39

## 2021-05-08 RX ADMIN — CEFAZOLIN SODIUM 2 G: 1 INJECTION, POWDER, FOR SOLUTION INTRAMUSCULAR; INTRAVENOUS at 18:41

## 2021-05-08 RX ADMIN — DEXAMETHASONE SODIUM PHOSPHATE 4 MG: 4 INJECTION, SOLUTION INTRAMUSCULAR; INTRAVENOUS at 10:39

## 2021-05-08 RX ADMIN — ASPIRIN 81 MG: 81 TABLET, COATED ORAL at 18:41

## 2021-05-08 RX ADMIN — ROCURONIUM BROMIDE 10 MG: 10 SOLUTION INTRAVENOUS at 11:21

## 2021-05-08 RX ADMIN — FENTANYL CITRATE 75 MCG: 50 INJECTION, SOLUTION INTRAMUSCULAR; INTRAVENOUS at 10:31

## 2021-05-08 RX ADMIN — RISPERIDONE 1 MG: 0.5 TABLET ORAL at 18:41

## 2021-05-08 RX ADMIN — Medication 10 MG: at 12:04

## 2021-05-08 RX ADMIN — Medication 120 MCG: at 10:53

## 2021-05-08 RX ADMIN — WATER 2 G: 1 INJECTION INTRAMUSCULAR; INTRAVENOUS; SUBCUTANEOUS at 10:50

## 2021-05-08 RX ADMIN — PHENYLEPHRINE HYDROCHLORIDE 60 MCG/MIN: 10 INJECTION INTRAVENOUS at 11:13

## 2021-05-08 RX ADMIN — Medication 200 MCG: at 11:44

## 2021-05-08 RX ADMIN — ROCURONIUM BROMIDE 10 MG: 10 SOLUTION INTRAVENOUS at 11:38

## 2021-05-08 RX ADMIN — Medication 10 MG: at 11:51

## 2021-05-08 RX ADMIN — Medication 120 MCG: at 10:31

## 2021-05-08 RX ADMIN — Medication 200 MCG: at 11:12

## 2021-05-08 RX ADMIN — DOCUSATE SODIUM 50 MG AND SENNOSIDES 8.6 MG 1 TABLET: 8.6; 5 TABLET, FILM COATED ORAL at 18:42

## 2021-05-08 RX ADMIN — ACETAMINOPHEN 650 MG: 325 TABLET ORAL at 10:15

## 2021-05-08 RX ADMIN — FENTANYL CITRATE 25 MCG: 50 INJECTION, SOLUTION INTRAMUSCULAR; INTRAVENOUS at 10:29

## 2021-05-08 RX ADMIN — NEOSTIGMINE METHYLSULFATE 2 MG: 1 INJECTION, SOLUTION INTRAVENOUS at 12:27

## 2021-05-08 RX ADMIN — KETAMINE HYDROCHLORIDE 10 MG: 10 INJECTION, SOLUTION INTRAMUSCULAR; INTRAVENOUS at 11:31

## 2021-05-08 RX ADMIN — KETAMINE HYDROCHLORIDE 20 MG: 10 INJECTION, SOLUTION INTRAMUSCULAR; INTRAVENOUS at 10:54

## 2021-05-08 RX ADMIN — ROCURONIUM BROMIDE 5 MG: 10 SOLUTION INTRAVENOUS at 10:31

## 2021-05-08 RX ADMIN — TRAZODONE HYDROCHLORIDE 50 MG: 50 TABLET ORAL at 21:45

## 2021-05-08 RX ADMIN — PROPOFOL 100 MG: 10 INJECTION, EMULSION INTRAVENOUS at 10:31

## 2021-05-08 RX ADMIN — BUSPIRONE HYDROCHLORIDE 10 MG: 10 TABLET ORAL at 21:45

## 2021-05-08 RX ADMIN — LIDOCAINE HYDROCHLORIDE 80 MG: 20 INJECTION, SOLUTION EPIDURAL; INFILTRATION; INTRACAUDAL; PERINEURAL at 10:31

## 2021-05-08 RX ADMIN — MORPHINE SULFATE 2 MG: 2 INJECTION, SOLUTION INTRAMUSCULAR; INTRAVENOUS at 11:36

## 2021-05-08 RX ADMIN — SUCCINYLCHOLINE CHLORIDE 60 MG: 20 INJECTION, SOLUTION INTRAMUSCULAR; INTRAVENOUS at 10:32

## 2021-05-08 RX ADMIN — Medication 10 MG: at 11:44

## 2021-05-08 RX ADMIN — SODIUM CHLORIDE 125 ML/HR: 9 INJECTION, SOLUTION INTRAVENOUS at 14:02

## 2021-05-08 NOTE — OP NOTES
Name: Kay Joyce  MRN:  [id number]  : 1925  Age:  80 y.o. Surgery Date: 2021      OPERATIVE REPORT - LEFT Ankit HIP ARTHROPLASTY -   POSTERIOR APPROACH     PREOPERATIVE DIAGNOSIS: Left femoral neck fx    POSTOPERATIVE DIAGNOSIS: Same as above    PROCEDURE PERFORMED: Left hip ankit arthroplasty. SURGEON: Johana Arnold MD   Co-surgeon: Jim Montez MD    ANESTHESIA: General    PRE-OP ANTIBIOTIC: Ancef 2g    COMPLICATIONS: None. ESTIMATED BLOOD LOSS: 300 mL. SPECIMENS REMOVED: None. COMPONENTS IMPLANTED:   Implant Name Type Inv. Item Serial No.  Lot No. LRB No. Used Action   Prep Kit with Restrictors   [id number]  [id number] Left 1 Implanted   CEMENT BNE 40GM FULL DOSE PMMA W/O GENT M VISC N RADPQ LNG - SN/A  CEMENT BNE 40GM FULL DOSE PMMA W/O GENT M VISC N RADPQ LNG N/A [facility] [employer] [id number] Left 2 Implanted   CENTRALIZER STEM [id number] DST FEM TRISH MOLD [place] BASIC - SN/A  CENTRALIZER STEM [id number] DST FEM TRISH MOLD [place] BASIC N/A [email or web address] [id number] Left 1 Implanted   HEAD FEM SLF-CENTER 45X28 GRY --  - SN/A  HEAD FEM SLF-CENTER 45X28 GRY --  N/A [facility] "[employer]S [id number] Left 1 Implanted   STEM FEM SZ 4 L114MM NK L34MM 40MM OFFSET 130DEG CALCAR HIP - SN/A  STEM FEM SZ 4 L114MM NK L34MM 40MM OFFSET 130DEG CALCAR HIP N/A [email or web address]ED[employer] [id number] Left 1 Implanted   HEAD FEM [id number] +5MM OFFSET STD 12/ TAPR ARTC EZ HIP MTL - SN/A  HEAD FEM [id number] +5MM OFFSET STD /14 TAPR ARTC EZ HIP MTL N/A [facility] [email or web address] ORTHOPED[employer]KA [id number] Left 1 Implanted       INDICATIONS: The patient is an 80 yrs female with left femoral neck fx. Risks, benefits, alternatives of the procedure were reviewed in detail and the patient elects to proceed. The patient understands the increased risk for perioperative medical complications. DESCRIPTION OF PROCEDURE: Anesthetic was initiated. Preoperative dose of IV antibiotic was given.  The patient was turned lateral. The left side was confirmed as the operative side, prepped and draped in the usual sterile fashion. Skin was covered with Ioban occlusive dressing. Posterolateral exposure was made through a standard length incision. The hip was exposed. Short rotators were taken down as a sleeve of tissue for repair at the end of the procedure. Femoral neck osteotomy was made. . Prior to dislocation, the leg length determination was made. Femur was positioned and elevated out of the wound. Medullary canal was entered,  lateralized into the greater trochanter and then reamed to a size 4 broached to a size 4, which was rotationally and axially stable. Cement was first placed into the canal and the cement restrictor was placed before that. The cement was finger packed. Calcar planed and then trialed, 45 mm, +5 hip ball was chosen for appropriate leg length and tension. The hip was dislocated. The anterior greater trochanter was debulked to enhance flexion, rotation and stability. The trial was removed,the real stem was impacted without issue. The +5 hip ball was placed. The  hip was lined up and reduced. The deep wound was irrigated with thepulsatile lavage. Short rotators and posterior capsule were closed through drill holes in the greater trochanter with #2 Ethibond sutures in 15 degrees of internal rotation. The deep wound was irrigated again. The fascia of the IT band and gluteus sylvia were closed with #1 Vicryl sutures. Skin and subcutaneous were irrigated and closed in standard fashion. A sterile dressing was applied. There were no complications. No specimen was sent. Procedure was LEFT HIP lisa arthroplasty- POSTERIOR APPROACH. The patient was taken to the recovery room in stable condition. Dr. Jesus Young was critical throughout the case to assist with positioning, retraction and closure.      Rex Person MD

## 2021-05-08 NOTE — ANESTHESIA PREPROCEDURE EVALUATION
Relevant Problems   NEUROLOGY   (+) Moderate dementia with behavioral disturbance (HCC)      CARDIOVASCULAR   (+) Essential hypertension, benign       Anesthetic History   No history of anesthetic complications            Review of Systems / Medical History  Patient summary reviewed, nursing notes reviewed and pertinent labs reviewed    Pulmonary  Within defined limits                 Neuro/Psych         Dementia     Cardiovascular    Hypertension                   GI/Hepatic/Renal  Within defined limits              Endo/Other        Arthritis     Other Findings              Physical Exam    Airway  Mallampati: II  TM Distance: > 6 cm  Neck ROM: normal range of motion   Mouth opening: Normal     Cardiovascular  Regular rate and rhythm,  S1 and S2 normal,  no murmur, click, rub, or gallop             Dental  No notable dental hx       Pulmonary  Breath sounds clear to auscultation               Abdominal  GI exam deferred       Other Findings            Anesthetic Plan    ASA: 3, emergent  Anesthesia type: general          Induction: Intravenous  Anesthetic plan and risks discussed with: Patient

## 2021-05-08 NOTE — PROGRESS NOTES
[facility]                                                                                          Hospitalist Progress Note  [address] [facility], DO  Answering service: [phone] OR [phone] from in house phone        Date of Service:  2021  NAME:  Sera Almaraz  :  1925  MRN:  [id number]      Admission Summary:   80 y.o. female who presents with fall   Pt lives at [facility] our [facility]. Has dementia. Son gave history   77-year-old female history of hypertension, dementia, degenerative joint disease presents to the emergency department for reported fall yesterday. Kaylah Singh resides at our [facility] and had mechanical fall yesterday.  X-ray performed at the nursing home today concerning for left femur fracture.  She denies any other injuries.  known to have dementiaShe is reportedly at her baseline mental status of ANO x2.  She only complains of pain during palpation or manipulation of her left leg. Interval history / Subjective: Follow up left femoral neck fracture. Patient seen and examined post operatively. Patient drowsy. Reviewed operative report. Assessment & Plan:     Left femoral neck fracture:   -Ortho consulted  -s/p left hip arthroplasty   -continue scheduled tylenol, tramadol prn   -okay to continue IVFs for now, d/c   -DVT prophylaxis with aspirin   -bowel regimen   -PT/OT    Dementia:   -continue home medications    HTN: hold amlodipine       Code status: DNR. DDNR on file. DVT prophylaxis: aspirin     Care Plan discussed with: Nurse  Anticipated Disposition: SNF/LTC  Anticipated Discharge: Greater than 48 hours     Hospital Problems  Date Reviewed: 2021          Codes Class Noted POA    Hip fracture [facility]) ICD-10-CM: J35.321L  ICD-9-CM: 820.8  2021 Unknown                Review of Systems:   Unable to obtain       Vital Signs:    Last 24hrs VS reviewed since prior progress note.  Most recent are:  Visit Vitals  BP (!) 116/52 Pulse 100   Temp 98.2 °F (36.8 °C)   Resp 14   Wt 58.6 kg (129 lb 3.2 oz)   SpO2 99%   BMI 24.41 kg/m²         Intake/Output Summary (Last 24 hours) at 5/8/2021 1628  Last data filed at 5/8/2021 1402  Gross per 24 hour   Intake 600 ml   Output 350 ml   Net 250 ml        Physical Examination:     I had a face to face encounter with this patient and independently examined them on 5/8/2021 as outlined below:          Constitutional:  No acute distress, cooperative, pleasant, demented    ENT:  Oral mucosa moist, oropharynx benign. Resp:  CTA bilaterally. No wheezing/rhonchi/rales. No accessory muscle use   CV:  Regular rhythm, normal rate, no murmurs, gallops, rubs    GI:  Soft, non distended, non tender. normoactive bowel sounds, no hepatosplenomegaly     Musculoskeletal:  No edema, warm, 2+ pulses throughout    Neurologic:  Moves all extremities          Data Review:    Review and/or order of clinical lab test  Review and/or order of tests in the radiology section of CPT  Review and/or order of tests in the medicine section of CPT      Labs:     Recent Labs     05/08/21  0416 05/07/21  1032   WBC 9.9 8.8   HGB 13.2 11.2*   HCT 41.5 36.1    148*     Recent Labs     05/08/21  0416 05/07/21  1032    143   K 3.7 3.4*    111*   CO2 27 25   BUN 13 12   CREA 0.54* 0.42*   * 95   CA 8.8 8.0*   MG 2.0  --    PHOS 2.8  --      Recent Labs     05/08/21  0416   ALT 20   AP 71   TBILI 0.7   TP 6.4   ALB 3.1*   GLOB 3.3     No results for input(s): INR, PTP, APTT, INREXT in the last 72 hours. No results for input(s): FE, TIBC, PSAT, FERR in the last 72 hours. No results found for: FOL, RBCF   No results for input(s): PH, PCO2, PO2 in the last 72 hours. No results for input(s): CPK, CKNDX, TROIQ in the last 72 hours.     No lab exists for component: CPKMB  No results found for: CHOL, CHOLX, CHLST, CHOLV, HDL, HDLP, LDL, LDLC, DLDLP, TGLX, TRIGL, TRIGP, CHHD, CHHDX  Lab Results   Component Value Date/Time    Glucose (POC) 136 (H) 03/15/2011 06:52 PM     Lab Results   Component Value Date/Time    Color YELLOW/STRAW 05/07/2021 06:05 PM    Appearance CLEAR 05/07/2021 06:05 PM    Specific gravity 1.011 05/07/2021 06:05 PM    pH (UA) 7.5 05/07/2021 06:05 PM    Protein Negative 05/07/2021 06:05 PM    Glucose Negative 05/07/2021 06:05 PM    Ketone Negative 05/07/2021 06:05 PM    Bilirubin Negative 05/07/2021 06:05 PM    Urobilinogen 1.0 05/07/2021 06:05 PM    Nitrites Positive (A) 05/07/2021 06:05 PM    Leukocyte Esterase TRACE (A) 05/07/2021 06:05 PM    Epithelial cells FEW 05/07/2021 06:05 PM    Bacteria 4+ (A) 05/07/2021 06:05 PM    WBC 0-4 05/07/2021 06:05 PM    RBC 5-10 05/07/2021 06:05 PM         Medications Reviewed:     Current Facility-Administered Medications   Medication Dose Route Frequency    0.9% sodium chloride infusion  125 mL/hr IntraVENous CONTINUOUS    sodium chloride 0.9 % bolus infusion 500 mL  500 mL IntraVENous ONCE PRN    sodium chloride (NS) flush 5-40 mL  5-40 mL IntraVENous Q8H    sodium chloride (NS) flush 5-40 mL  5-40 mL IntraVENous PRN    naloxone (NARCAN) injection 0.4 mg  0.4 mg IntraVENous PRN    senna-docusate (PERICOLACE) 8.6-50 mg per tablet 1 Tab  1 Tab Oral BID    [START ON 5/9/2021] polyethylene glycol (MIRALAX) packet 17 g  17 g Oral DAILY    [START ON 5/10/2021] bisacodyL (DULCOLAX) suppository 10 mg  10 mg Rectal DAILY PRN    ceFAZolin (ANCEF) 2 g in sterile water (preservative free) 20 mL IV syringe  2 g IntraVENous Q8H    amLODIPine (NORVASC) tablet 2.5 mg  2.5 mg Oral DAILY    aspirin delayed-release tablet 81 mg  81 mg Oral BID    busPIRone (BUSPAR) tablet 10 mg  10 mg Oral TID    loratadine (CLARITIN) tablet 10 mg  10 mg Oral DAILY    memantine (NAMENDA) tablet 10 mg  10 mg Oral BID    multivitamin, tx-iron-ca-min (THERA-M w/ IRON) tablet 1 Tab  1 Tab Oral DAILY    risperiDONE (RisperDAL) tablet 0.25 mg  0.25 mg Oral ACL    risperiDONE (RisperDAL) tablet 1 mg  1 mg Oral QPM    traZODone (DESYREL) tablet 50 mg  50 mg Oral QHS    sodium chloride (NS) flush 5-40 mL  5-40 mL IntraVENous Q8H    sodium chloride (NS) flush 5-40 mL  5-40 mL IntraVENous PRN    polyethylene glycol (MIRALAX) packet 17 g  17 g Oral DAILY PRN    promethazine (PHENERGAN) tablet 12.5 mg  12.5 mg Oral Q6H PRN    Or    ondansetron (ZOFRAN) injection 4 mg  4 mg IntraVENous Q6H PRN    acetaminophen (TYLENOL) tablet 650 mg  650 mg Oral Q8H    traMADoL (ULTRAM) tablet 50 mg  50 mg Oral Q6H PRN    morphine injection 2 mg  2 mg IntraVENous Q4H PRN    haloperidol lactate (HALDOL) injection 1 mg  1 mg IntraVENous Q6H PRN     ______________________________________________________________________  EXPECTED LENGTH OF STAY: - - -  ACTUAL LENGTH OF STAY:          [address] [facility],

## 2021-05-08 NOTE — PERIOP NOTES
TRANSFER - OUT REPORT:    Verbal report given to Keira Patino RN on Edd Hassan  being transferred to [phone] for routine post - op       Report consisted of patients Situation, Background, Assessment and   Recommendations(SBAR). Time Pre op antibiotic given:1050  Anesthesia Stop time: 3119  Leone Present on Transfer to floor:yes  Order for Leone on Chart:yes  Discharge Prescriptions with Chart:none    Information from the following report(s) OR Summary, Procedure Summary, Intake/Output, MAR, Accordion, Recent Results, Med Rec Status and Cardiac Rhythm nsr wit BBB was reviewed with the receiving nurse. Opportunity for questions and clarification was provided. Is the patient on 02? YES       L/Min 2       Other nasal cannula    Is the patient on a monitor? NO    Is the nurse transporting with the patient? NO    Surgical Waiting Area notified of patient's transfer from PACU? YES. Son notified.       The following personal items collected during your admission accompanied patient upon transfer:   Dental Appliance: Dental Appliances: None  Vision: Visual Aid: None  Hearing Aid:    Jewelry:    Clothing:    Other Valuables:    Valuables sent to safe:

## 2021-05-08 NOTE — PROGRESS NOTES
Occupational Therapy     Orders received, chart review completed. Note patient POD #0 L hip hemiarthroplasty posterior approach. OT will follow up tomorrow for evaluation. Recommend OOB to chair three times a day for meals, self-completion of ADLs as able and medically stable. Thank you.

## 2021-05-08 NOTE — PERIOP NOTES
Discussed setting at [address] [place] [address] unit extension # [phone]. Patient has had Covid vaccines completed series on 12-, 1-.

## 2021-05-08 NOTE — PROGRESS NOTES
Bedside and Verbal shift change report given to Keira Patino RN (oncoming nurse) by Marely Trivedi RN (offgoing nurse). Report included the following information SBAR, Kardex, Intake/Output and MAR.

## 2021-05-08 NOTE — ANESTHESIA POSTPROCEDURE EVALUATION
Post-Anesthesia Evaluation and Assessment    Patient: Obi Marinelli MRN: [id number]  SSN: [id number]    YOB: 1925  Age: 80 y.o. Sex: female      I have evaluated the patient and they are stable and ready for discharge from the PACU. Cardiovascular Function/Vital Signs  Visit Vitals  BP (!) 120/59   Pulse (!) 106   Temp 36.6 °C (97.8 °F)   Resp 16   Wt 58.6 kg (129 lb 3.2 oz)   SpO2 99%   BMI 24.41 kg/m²       Patient is status post General anesthesia for Procedure(s):  LEFT HIP HEMIARTHROPLASTY. Nausea/Vomiting: None    Postoperative hydration reviewed and adequate. Pain:  Pain Scale 1: Numeric (0 - 10) (05/08/21 1003)  Pain Intensity 1: 0 (05/08/21 1003)   Managed    Neurological Status:   Neuro  Neurologic State: Alert;Confused (05/08/21 4367)  Orientation Level: Oriented to person (05/07/21 2104)  Cognition: Memory loss;Decreased attention/concentration (05/07/21 2104)  Speech: Clear (05/07/21 2104)  LUE Motor Response: Weak (05/07/21 2104)  LLE Motor Response: Weak (05/07/21 2104)  RUE Motor Response: Weak (05/07/21 2104)  RLE Motor Response: Weak (05/07/21 2104)   At baseline    Mental Status, Level of Consciousness: Alert and  oriented to person, place, and time    Pulmonary Status:   O2 Device: CO2 nasal cannula (05/08/21 1250)   Adequate oxygenation and airway patent    Complications related to anesthesia: None    Post-anesthesia assessment completed. No concerns    Signed By: Elaina Lombard, MD     May 8, 2021              Procedure(s):  LEFT HIP HEMIARTHROPLASTY. general    <BSHSIANPOST>    INITIAL Post-op Vital signs:   Vitals Value Taken Time   /59 05/08/21 1305   Temp 36.6 °C (97.8 °F) 05/08/21 1250   Pulse 102 05/08/21 1309   Resp 14 05/08/21 1309   SpO2 100 % 05/08/21 1309   Vitals shown include unvalidated device data.

## 2021-05-09 LAB
ANION GAP SERPL CALC-SCNC: 4 MMOL/L (ref 5–15)
ATRIAL RATE: 99 BPM
BUN SERPL-MCNC: 22 MG/DL (ref 6–20)
BUN/CREAT SERPL: 31 (ref 12–20)
CALCIUM SERPL-MCNC: 7.6 MG/DL (ref 8.5–10.1)
CALCULATED P AXIS, ECG09: 63 DEGREES
CALCULATED R AXIS, ECG10: 78 DEGREES
CALCULATED T AXIS, ECG11: 8 DEGREES
CHLORIDE SERPL-SCNC: 111 MMOL/L (ref 97–108)
CO2 SERPL-SCNC: 25 MMOL/L (ref 21–32)
CREAT SERPL-MCNC: 0.71 MG/DL (ref 0.55–1.02)
DIAGNOSIS, 93000: NORMAL
ERYTHROCYTE [DISTWIDTH] IN BLOOD BY AUTOMATED COUNT: 13.8 % (ref 11.5–14.5)
GLUCOSE BLD STRIP.AUTO-MCNC: 121 MG/DL (ref 65–100)
GLUCOSE BLD STRIP.AUTO-MCNC: 94 MG/DL (ref 65–100)
GLUCOSE SERPL-MCNC: 115 MG/DL (ref 65–100)
HCT VFR BLD AUTO: 34.5 % (ref 35–47)
HGB BLD-MCNC: 10.8 G/DL (ref 11.5–16)
MAGNESIUM SERPL-MCNC: 1.9 MG/DL (ref 1.6–2.4)
MCH RBC QN AUTO: 30.1 PG (ref 26–34)
MCHC RBC AUTO-ENTMCNC: 31.3 G/DL (ref 30–36.5)
MCV RBC AUTO: 96.1 FL (ref 80–99)
NRBC # BLD: 0 K/UL (ref 0–0.01)
NRBC BLD-RTO: 0 PER 100 WBC
P-R INTERVAL, ECG05: 150 MS
PHOSPHATE SERPL-MCNC: 3.1 MG/DL (ref 2.6–4.7)
PLATELET # BLD AUTO: 149 K/UL (ref 150–400)
PMV BLD AUTO: 9.8 FL (ref 8.9–12.9)
POTASSIUM SERPL-SCNC: 4.2 MMOL/L (ref 3.5–5.1)
Q-T INTERVAL, ECG07: 368 MS
QRS DURATION, ECG06: 114 MS
QTC CALCULATION (BEZET), ECG08: 472 MS
RBC # BLD AUTO: 3.59 M/UL (ref 3.8–5.2)
SERVICE CMNT-IMP: ABNORMAL
SERVICE CMNT-IMP: NORMAL
SODIUM SERPL-SCNC: 140 MMOL/L (ref 136–145)
VENTRICULAR RATE, ECG03: 99 BPM
WBC # BLD AUTO: 9 K/UL (ref 3.6–11)

## 2021-05-09 PROCEDURE — 74011000250 HC RX REV CODE- 250: Performed by: INTERNAL MEDICINE

## 2021-05-09 PROCEDURE — 77030036660

## 2021-05-09 PROCEDURE — 36415 COLL VENOUS BLD VENIPUNCTURE: CPT

## 2021-05-09 PROCEDURE — 97535 SELF CARE MNGMENT TRAINING: CPT

## 2021-05-09 PROCEDURE — 83735 ASSAY OF MAGNESIUM: CPT

## 2021-05-09 PROCEDURE — 84100 ASSAY OF PHOSPHORUS: CPT

## 2021-05-09 PROCEDURE — 74011250637 HC RX REV CODE- 250/637: Performed by: INTERNAL MEDICINE

## 2021-05-09 PROCEDURE — 97530 THERAPEUTIC ACTIVITIES: CPT

## 2021-05-09 PROCEDURE — 82962 GLUCOSE BLOOD TEST: CPT

## 2021-05-09 PROCEDURE — 97161 PT EVAL LOW COMPLEX 20 MIN: CPT

## 2021-05-09 PROCEDURE — 85027 COMPLETE CBC AUTOMATED: CPT

## 2021-05-09 PROCEDURE — 97165 OT EVAL LOW COMPLEX 30 MIN: CPT

## 2021-05-09 PROCEDURE — 74011250636 HC RX REV CODE- 250/636: Performed by: INTERNAL MEDICINE

## 2021-05-09 PROCEDURE — 80048 BASIC METABOLIC PNL TOTAL CA: CPT

## 2021-05-09 PROCEDURE — 77010033678 HC OXYGEN DAILY

## 2021-05-09 PROCEDURE — 65270000029 HC RM PRIVATE

## 2021-05-09 PROCEDURE — 74011250637 HC RX REV CODE- 250/637: Performed by: HOSPITALIST

## 2021-05-09 RX ADMIN — LORATADINE 10 MG: 10 TABLET ORAL at 08:27

## 2021-05-09 RX ADMIN — TRAZODONE HYDROCHLORIDE 50 MG: 50 TABLET ORAL at 22:14

## 2021-05-09 RX ADMIN — DOCUSATE SODIUM 50 MG AND SENNOSIDES 8.6 MG 1 TABLET: 8.6; 5 TABLET, FILM COATED ORAL at 17:47

## 2021-05-09 RX ADMIN — Medication 1 TABLET: at 08:27

## 2021-05-09 RX ADMIN — BUSPIRONE HYDROCHLORIDE 10 MG: 10 TABLET ORAL at 17:47

## 2021-05-09 RX ADMIN — ACETAMINOPHEN 650 MG: 325 TABLET ORAL at 05:42

## 2021-05-09 RX ADMIN — ACETAMINOPHEN 650 MG: 325 TABLET ORAL at 22:14

## 2021-05-09 RX ADMIN — BUSPIRONE HYDROCHLORIDE 10 MG: 10 TABLET ORAL at 22:14

## 2021-05-09 RX ADMIN — BUSPIRONE HYDROCHLORIDE 10 MG: 10 TABLET ORAL at 08:27

## 2021-05-09 RX ADMIN — Medication 10 ML: at 17:48

## 2021-05-09 RX ADMIN — MEMANTINE HYDROCHLORIDE 10 MG: 10 TABLET ORAL at 08:27

## 2021-05-09 RX ADMIN — ASPIRIN 81 MG: 81 TABLET, COATED ORAL at 17:47

## 2021-05-09 RX ADMIN — DOCUSATE SODIUM 50 MG AND SENNOSIDES 8.6 MG 1 TABLET: 8.6; 5 TABLET, FILM COATED ORAL at 08:27

## 2021-05-09 RX ADMIN — CEFAZOLIN SODIUM 2 G: 1 INJECTION, POWDER, FOR SOLUTION INTRAMUSCULAR; INTRAVENOUS at 02:12

## 2021-05-09 RX ADMIN — RISPERIDONE 0.25 MG: 0.5 TABLET ORAL at 13:21

## 2021-05-09 RX ADMIN — MEMANTINE HYDROCHLORIDE 10 MG: 10 TABLET ORAL at 17:47

## 2021-05-09 RX ADMIN — ASPIRIN 81 MG: 81 TABLET, COATED ORAL at 08:27

## 2021-05-09 RX ADMIN — ACETAMINOPHEN 650 MG: 325 TABLET ORAL at 13:21

## 2021-05-09 RX ADMIN — RISPERIDONE 1 MG: 0.5 TABLET ORAL at 17:47

## 2021-05-09 NOTE — PROGRESS NOTES
Problem: Mobility Impaired (Adult and Pediatric)  Goal: *Acute Goals and Plan of Care (Insert Text)  Description: FUNCTIONAL STATUS PRIOR TO ADMISSION: pt unable to provide any history. Per chart review, pt was ambulatory prior to admission. HOME SUPPORT PRIOR TO ADMISSION: The patient lived in a SNF per chart review. Physical Therapy Goals  Initiated 5/9/2021    1. Patient will move from supine to sit and sit to supine , scoot up and down, and roll side to side in bed with moderate assistance  within 4 days. 2. Patient will perform sit to stand with moderate assistance  within 4 days. 3. Patient will ambulate with moderate assistance  for 10 feet with the least restrictive device within 4 days. 4. Patient will adhere to routine precautions per protocol with assist within 4 days. 5. Patient will perform routine hip home exercise program per protocol with moderate assistance  within 4 days. Outcome: Progressing Towards Goal   PHYSICAL THERAPY EVALUATION  Patient: Patricia Arnold (38 y.o. female)  Date: 5/9/2021  [facility]: [facility] [S72.009A]  Procedure(s) (LRB):  LEFT HIP HEMIARTHROPLASTY (Left) 1 Day Post-Op   Precautions:   Fall, WBAT, Bed Alarm, Total hip, Skin([place], wedge pillow)    ASSESSMENT  Based on the objective data described below, the patient presents with confusion, dementia at baseline per chart review (today oriented to self only), ongoing talk about dying imminently, and need for total assist for all of mobility. Able to get her to sitting at the EOB but with poor sitting balance with lean posteriorly and to her right to offload her left hip. She is s/p a fall and fracture of her left femur and is now POD ! Ijeoma Bojorquez Anticipate slow gains, poor retention of any education and precautions and recommend return to SNF for rehab.  .      Vitals:      05/09/21 0944 05/09/21 0953   BP:   104/61 130/65   BP 1 Location:   Right upper arm Right upper arm   BP Patient Position: Supine Sitting   Pulse:   83 (!) 104   Resp:       Temp:       SpO2: on room air   96%                Current Level of Function Impacting Discharge (mobility/balance): total assist X 2    Functional Outcome Measure: The patient scored 0 on the TUG outcome measure which is indicative of high fall risk . Other factors to consider for discharge: Per  in her chart, pt is a full code, discussed with orthopedic surgeon and pt's nurse. Patient will benefit from skilled therapy intervention to address the above noted impairments. PLAN :  Recommendations and Planned Interventions: bed mobility training, transfer training, gait training, therapeutic exercises, patient and family training/education, and therapeutic activities      Frequency/Duration: Patient will be followed by physical therapy:  twice daily to address goals. Recommendation for discharge: (in order for the patient to meet his/her long term goals)  Therapy up to 5 days/week in SNF setting    This discharge recommendation:  A follow-up discussion with the attending provider and/or case management is planned    IF patient discharges home will need the following DME: bedside commode, hospital bed, mechanical lift, and wheelchair         SUBJECTIVE:   Patient stated I'm going to die, I'm going to die right now.  Pt stated this multiple times during eval. Per  in her chart, pt is a full code, discussed with orthopedic surgeon and pt's nurse.     OBJECTIVE DATA SUMMARY:   Consult received, chart reviewed, pt cleared by nursing  HISTORY:    Past Medical History:   Diagnosis Date    DJD (degenerative joint disease)     Hematuria, microscopic     Hypertension     Memory loss     Menopause     Osteopenia      Past Surgical History:   Procedure Laterality Date    HX HYSTERECTOMY         Personal factors and/or comorbidities impacting plan of care: dementia (pt unable to provide any history), per chart review, pt was ambulatory at SNF (in memory care). Also per chart, pt was here in November last year after a fall, fracture, and repair of her right hip. Home Situation  Home Environment: [address] [facility] Name: [facility]  # Steps to Enter: (elevator)  Living Alone: No  Support Systems: (staff)    EXAMINATION/PRESENTATION/DECISION MAKING:   Critical Behavior:  Neurologic State: Alert, Confused  Orientation Level: Oriented to person, Disoriented to place, Disoriented to situation, Disoriented to time  Cognition: Follows commands     Hearing: Auditory  Auditory Impairment: Hard of hearing, right side  Skin:  refer to MD and nursing notes  Edema: none noted  Range Of Motion:  AROM: Generally decreased, functional                       Strength:    Strength: Generally decreased, functional                    Tone & Sensation:                  Sensation: (unable to assess)               Coordination:     Vision:      Functional Mobility:  Bed Mobility:     Supine to Sit: Total assistance;Assist x2  Sit to Supine: Total assistance;Assist x2     Transfers:                     Deemed not safe to assess        Balance:   Sitting: Impaired; With support  Sitting - Static: Poor (constant support)  Sitting - Dynamic: Poor (constant support)  Ambulation/Gait Training:                                                         Stairs: Therapeutic Exercises:       Functional Measure:  Timed up and go:    Timed Get Up And Go Test: 0(unable)       < than 10 seconds=Normal  Greater then 13.5 seconds (in elderly)=Increased fall risk   Sriram COLLADO, Vane Berger. Predicting the probability for falls in community dwelling older adults using the Timed Up and Go Test. Phys Ther. 2000;80:896-903.              Physical Therapy Evaluation Charge Determination   History Examination Presentation Decision-Making   HIGH Complexity :3+ comorbidities / personal factors will impact the outcome/ POC  MEDIUM Complexity : 3 Standardized tests and measures addressing body structure, function, activity limitation and / or participation in recreation  LOW Complexity : Stable, uncomplicated  LOW Complexity : FOTO score of       Based on the above components, the patient evaluation is determined to be of the following complexity level: LOW     Pain Rating:  Pt unable to rate, pain reaction with movement    Activity Tolerance:   Fair to poor  After treatment patient left in no apparent distress:   Supine in bed, Heels elevated for pressure relief, Call bell within reach, Bed / chair alarm activated, Side rails x 3, and wedge pillow in place    COMMUNICATION/EDUCATION:   The patients plan of care was discussed with: Registered nurse and Physician. Patient is unable to participate in goal setting and plan of care.     Thank you for this referral.  Leatha Bautista   Time Calculation: 27 mins

## 2021-05-09 NOTE — PROGRESS NOTES
1415:  TRANSFER - IN REPORT:    Verbal report received from [employer] (name) on Corwin Kellogg  being received from PACU (unit) for routine post - op    Report consisted of patients Situation, Background, Assessment and   Recommendations(SBAR). Information from the following report(s) SBAR, Kardex, Intake/Output, MAR and Recent Results was reviewed with the receiving nurse. Opportunity for questions and clarification was provided. Assessment completed upon patients arrival to unit and care assumed. 1952: Bedside shift change report given to Brandie Higgins RN (oncoming nurse) by Carolann Rondon RN (offgoing nurse). Report included the following information SBAR, Kardex, OR Summary, Intake/Output, MAR and Recent Results. Primary Nurse Brionna Rueda and Brandie Higgins RN performed a dual skin assessment on this patient. Incision to L hip.   Humberto score is 13

## 2021-05-09 NOTE — PROGRESS NOTES
Problem: Self Care Deficits Care Plan (Adult)  Goal: *Acute Goals and Plan of Care (Insert Text)  Description:   FUNCTIONAL STATUS PRIOR TO ADMISSION: Pt poor historian; therefore, true PLOF undetermined. Chart indicates pt resides at [facility] and was ambulatory at [facility] at baseline. HOME SUPPORT: The patient lived with [facility] staff to provide unknown amount of assist.    Occupational Therapy Goals  Initiated 5/9/2021  1. Patient will perform inclined self-feeding with supervision/set-up within 4 day(s). 2.  Patient will perform inclined grooming with minimal assistance within 4 day(s). 3.  Patient will perform inclined upper body dressing with moderate assistance  within 4 day(s). 4.  Patient will perform toilet transfers, from EOB to [facility], with maximal assistance within 4 day(s). 5.  Patient will perform all aspects of toileting, seated BS, with maximal assistance within 4 day(s). Outcome: Not Met    OCCUPATIONAL THERAPY EVALUATION  Patient: Kwaku Garcia (89 y.o. female)  Date: 5/9/2021  Primary Diagnosis: Hip fracture ([facility] [address] [S72.009A]  Procedure(s) (LRB):  LEFT HIP HEMIARTHROPLASTY (Left) 1 Day Post-Op   Precautions:  Fall, WBAT, Bed Alarm, Total hip, Skin([place], wedge pillow)    ASSESSMENT  Based on the objective data described below, the patient presents with decreased problem solving/sequencing/safety/task initiation, LLE pain with movement, decreased strength/endurance, decreased mobility/balance, decreased full body reaching, decreased activity tolerance and no awareness to posterior hip precautions, all of which limit pt's ability to safely complete self-care routine. Pt poor historian; therefore, PLOF undetermined; however, pt admitted from [facility] with chart indicating pt was ambulatory at [facility] level. Pt Total A for side rolling this date, with Physical Therapy reporting she was Total A x2 to come to/return from EOB.   Pt noted with good ability to bring B hands to mouth; however, confusion and decreased activity tolerance limits self-feeding and grooming independence, with pt benefiting from hand-over-hand assist, as well as, Max A for thoroughness of task completion. Currently, pt is Max A for inclined self-feeding/grooming with Total A for bathing/dressing/toileting. Pt benefits from skilled OT to address functional deficits during acute hospitalization, with reporting therapist believing pt will benefit from return to LTC with rehab upon discharge. Current Level of Function Impacting Discharge (ADLs/self-care): Max A for inclined self-feeding/grooming with Total A for bathing/dressing/toileting. Functional Outcome Measure: The patient scored 0/100 on the Barthel Index outcome measure. Other factors to consider for discharge: pain, confusion, low activity tolerance     Patient will benefit from skilled therapy intervention to address the above noted impairments. PLAN :  Recommendations and Planned Interventions: self care training, functional mobility training, therapeutic exercise, balance training, therapeutic activities, endurance activities, and patient education    Frequency/Duration: Patient will be followed by occupational therapy 3 times a week to address goals. Recommendation for discharge: (in order for the patient to meet his/her long term goals)  Therapy up to 5 days/week in SNF setting    This discharge recommendation:  Has not yet been discussed the attending provider and/or case management    IF patient discharges home will need the following DME: TBD       SUBJECTIVE:   Patient stated I live right behind you.  re: attempts at obtaining PLOF    OBJECTIVE DATA SUMMARY:   HISTORY:   Past Medical History:   Diagnosis Date    DJD (degenerative joint disease)     Hematuria, microscopic     Hypertension     Memory loss     Menopause     Osteopenia      Past Surgical History:   Procedure Laterality Date    HX HYSTERECTOMY         Expanded or extensive additional review of patient history:     Home Situation  Home Environment: [address] [facility] Name: [facility] [employer]  # Steps to Enter: (elevator)  Living Alone: No  Support Systems: (staff)    Hand dominance: unknown    EXAMINATION OF PERFORMANCE DEFICITS:  Cognitive/Behavioral Status:  Neurologic State: Alert;Confused  Orientation Level: Oriented to person;Disoriented to place; Disoriented to situation;Disoriented to time  Perseveration: No perseveration noted  Safety/Judgement: Decreased awareness of environment;Decreased awareness of need for assistance;Decreased awareness of need for safety;Decreased insight into deficits    Hearing: Auditory  Auditory Impairment: Hard of hearing, bilateral    Vision/Perceptual:    Corrective Lenses: (unknown)    Range of Motion:  AROM: Generally decreased, functional    Strength:  Strength: Generally decreased, functional    Coordination:  Fine Motor Skills-Upper: Left Intact; Right Intact    Gross Motor Skills-Upper: Left Intact; Right Intact(general weakness noted at B shoulders)    Tone & Sensation:  Sensation: (unable to assess)    Balance:  Sitting: Impaired; With support- Per Physical Therapist  Sitting - Static: Poor (constant support)- Per Physical Therapist  Sitting - Dynamic: Poor (constant support)- Per Physical Therapist    Functional Mobility and Transfers for ADLs:  Bed Mobility:  Supine to Sit: Total assistance;Assist x2- Per Physical Therapist  Sit to Supine: Total assistance;Assist x2- Per Physical Therapist    ADL Assessment:  Patient recalled and demonstrated avoiding extreme planes of movement with Left LE during ADLs and functional mobility with Max verbal cues and Total physical assist.    Feeding: Maximum assistance(2/2 decreased cognition/fatigue; good B hand to mouth)    Oral Facial Hygiene/Grooming: Maximum assistance(2/2 decreased cognition/fatigue; good B hand to mouth)    Bathing: Total assistance    Upper Body Dressing:  Total assistance    Lower Body Dressing: Total assistance    Toileting: Total assistance    ADL Intervention and task modifications:  Cognitive Retraining  Safety/Judgement: Decreased awareness of environment;Decreased awareness of need for assistance;Decreased awareness of need for safety;Decreased insight into deficits    Bathing: Patient instructed when bathing to not submerge wound in water, stand to shower or sponge bathe, cover wound with plastic and tape to ensure no water reaches bandage/wound without cues. Patient indicated understanding. Dressing joint: Patient instructed don/doff Left LE first/last Max cues. Patient instructed to don all clothing while sitting prior to standing, doff all clothing to knees while standing, then sit to doff clothing off from knees to feet in order to facilitate fall prevention, pain management, and energy conservation with Total assistance. Standing: Instructed to apply concept of hip contraindications to ADLs within the home (posterior hip precautions to LLE, square off while using objects, slide objects along surfaces). Functional Measure:  Barthel Index:    Bathin  Bladder: 0  Bowels: 0  Groomin  Dressin  Feedin  Mobility: 0  Stairs: 0  Toilet Use: 0  Transfer (Bed to Chair and Back): 0  Total: 0/100        The Barthel ADL Index: Guidelines  1. The index should be used as a record of what a patient does, not as a record of what a patient could do. 2. The main aim is to establish degree of independence from any help, physical or verbal, however minor and for whatever reason. 3. The need for supervision renders the patient not independent. 4. A patient's performance should be established using the best available evidence. Asking the patient, friends/relatives and nurses are the usual sources, but direct observation and common sense are also important. However direct testing is not needed.   5. Usually the patient's performance over the preceding 24-48 hours is important, but occasionally longer periods will be relevant. 6. Middle categories imply that the patient supplies over 50 per cent of the effort. 7. Use of aids to be independent is allowed. Yuly Carlson., Barthel, D.W. ([id number]). Functional evaluation: the Barthel Index. [address] [facility] (14)2. [facility] ed CLAUDIA Tyler Dorisann Nome., Sherita Dowell., [place], [address] (1999). Measuring the change indisability after inpatient rehabilitation; comparison of the responsiveness of the Barthel Index and Functional [place] Measure. Journal of Neurology, Neurosurgery, and Psychiatry, 66(4), 044-394. Juancho Tan, N.[place].A, YARELI Melendez, & Marcin Bullock M.A. (2004.) Assessment of post-stroke quality of life in cost-effectiveness studies: The usefulness of the Barthel Index and the EuroQoL-5D. Quality of Life Research, 15, 828-92     Occupational Therapy Evaluation Charge Determination   History Examination Decision-Making   LOW Complexity : Brief history review  HIGH Complexity : 5 or more performance deficits relating to physical, cognitive , or psychosocial skils that result in activity limitations and / or participation restrictions LOW Complexity : No comorbidities that affect functional and no verbal or physical assistance needed to complete eval tasks       Based on the above components, the patient evaluation is determined to be of the following complexity level: LOW   Pain Rating:  Did not quantify; however, indicated high amounts of LLE pain during rolling; nursing aware and following    Activity Tolerance:   Poor and requires frequent rest breaks    After treatment patient left in no apparent distress:    Supine in bed, Call bell within reach, Bed / chair alarm activated, and Side rails x 3    COMMUNICATION/EDUCATION:   The patients plan of care was discussed with: Physical therapist and Registered nurse. Patient is unable to participate in goal setting and plan of care.     This patients plan of care is appropriate for delegation to ERICK.     Thank you for this referral.  [facility], OT  Time Calculation: 20 mins

## 2021-05-09 NOTE — PROGRESS NOTES
DEVIN ON, pain controlled when at rest, seen during PT session this AM    Visit Vitals  /65 (BP 1 Location: Right upper arm, BP Patient Position: Sitting)   Pulse (!) 104   Temp 97.9 °F (36.6 °C)   Resp 16   Wt 58.6 kg (129 lb 3.2 oz)   SpO2 96%   BMI 24.41 kg/m²     NAD  Unlabored respirations  RR  Abdomen soft, NT  LLE: dressing c/d/i, +ankle DF/PF, SILT distally, compartments soft, BCR distally    89 YO F POD s/p L hip hemiarthroplasty    WBAT on LLE  PT/OT  Complete post op abx  Pain control  DVT ppx - ASA 81 mg BID    Ryan Clement MD

## 2021-05-09 NOTE — PROGRESS NOTES
Attempted second session. Pt drowsy, kept her eyes closed the entire time I was in her room. She remains oriented to self only. As soon as I moved her leg, she hollered out in pain. Unable to engage her in any mobility, ex even PROM without her hollering out. Gel packs applied to her hip and above discussed with her nurse.  Nathalie Street, PT

## 2021-05-09 NOTE — PROGRESS NOTES
[facility] Adult  Hospitalist Group                                                                                          Hospitalist Progress Note  [facility], DO  Answering service: [phone] OR [phone] from in house phone        Date of Service:  2021  NAME:  Kwaku Garcia  :  1925  MRN:  [id number]      Admission Summary:   80 y.o. female who presents with fall   Pt lives at [facility] our [facility]. Has dementia. Son gave history   59-year-old female history of hypertension, dementia, degenerative joint disease presents to the emergency department for reported fall yesterday. Mckenna Faust resides at our [facility] and had mechanical fall yesterday.  X-ray performed at the nursing home today concerning for left femur fracture.  She denies any other injuries.  known to have dementiaShe is reportedly at her baseline mental status of ANO x2.  She only complains of pain during palpation or manipulation of her left leg. Interval history / Subjective: Follow up left femoral neck fracture. Patient seen and examined. Alert to name. Sleeping but awakens. Alert to name only. Pleasant. Assessment & Plan:     Left femoral neck fracture:   -Ortho consulted  -s/p left hip arthroplasty   -continue scheduled tylenol, tramadol prn   -okay to continue IVFs for now, d/c   -DVT prophylaxis with aspirin   -bowel regimen   -PT/OT    Dementia:   -continue home medications    HTN: hold amlodipine       Code status: DNR. DDNR on file. DVT prophylaxis: aspirin     Care Plan discussed with: Nurse  Anticipated Disposition: SNF/LTC  Anticipated Discharge: Greater than 48 hours     Hospital Problems  Date Reviewed: 2021          Codes Class Noted POA    Hip fracture [facility]) ICD-10-CM: D45.578E  ICD-9-CM: 820.8  2021 Unknown                Review of Systems:   Unable to obtain       Vital Signs:    Last 24hrs VS reviewed since prior progress note.  Most recent are:  Visit Vitals  /65 (BP 1 Location: Right upper arm, BP Patient Position: Sitting)   Pulse (!) 104   Temp 97.9 °F (36.6 °C)   Resp 16   Wt 58.6 kg (129 lb 3.2 oz)   SpO2 96%   BMI 24.41 kg/m²       No intake or output data in the 24 hours ending 05/09/21 1548     Physical Examination:     I had a face to face encounter with this patient and independently examined them on 5/9/2021 as outlined below:          Constitutional:  No acute distress, cooperative, pleasant, demented    ENT:  Oral mucosa moist, oropharynx benign. Resp:  CTA bilaterally. No wheezing/rhonchi/rales. No accessory muscle use   CV:  Regular rhythm, normal rate, no murmurs, gallops, rubs    GI:  Soft, non distended, non tender. normoactive bowel sounds, no hepatosplenomegaly     Musculoskeletal:  No edema, warm, 2+ pulses throughout    Neurologic:  Moves all extremities          Data Review:    Review and/or order of clinical lab test  Review and/or order of tests in the radiology section of CPT  Review and/or order of tests in the medicine section of CPT      Labs:     Recent Labs     05/09/21 0216 05/08/21 0416   WBC 9.0 9.9   HGB 10.8* 13.2   HCT 34.5* 41.5   * 183     Recent Labs     05/09/21 0216 05/08/21  0416 05/07/21  1032    137 143   K 4.2 3.7 3.4*   * 106 111*   CO2 25 27 25   BUN 22* 13 12   CREA 0.71 0.54* 0.42*   * 108* 95   CA 7.6* 8.8 8.0*   MG 1.9 2.0  --    PHOS 3.1 2.8  --      Recent Labs     05/08/21 0416   ALT 20   AP 71   TBILI 0.7   TP 6.4   ALB 3.1*   GLOB 3.3     No results for input(s): INR, PTP, APTT, INREXT, INREXT in the last 72 hours. No results for input(s): FE, TIBC, PSAT, FERR in the last 72 hours. No results found for: FOL, RBCF   No results for input(s): PH, PCO2, PO2 in the last 72 hours. No results for input(s): CPK, CKNDX, TROIQ in the last 72 hours.     No lab exists for component: CPKMB  No results found for: CHOL, CHOLX, CHLST, CHOLV, HDL, HDLP, LDL, LDLC, DLDLP, TGLX, TRIGL, TRIGP, CHHD, [facility]  Lab Results   Component Value Date/Time    Glucose (POC) 94 05/09/2021 12:18 PM    Glucose (POC) 136 (H) 03/15/2011 06:52 PM     Lab Results   Component Value Date/Time    Color YELLOW/STRAW 05/07/2021 06:05 PM    Appearance CLEAR 05/07/2021 06:05 PM    Specific gravity 1.011 05/07/2021 06:05 PM    pH (UA) 7.5 05/07/2021 06:05 PM    Protein Negative 05/07/2021 06:05 PM    Glucose Negative 05/07/2021 06:05 PM    Ketone Negative 05/07/2021 06:05 PM    Bilirubin Negative 05/07/2021 06:05 PM    Urobilinogen 1.0 05/07/2021 06:05 PM    Nitrites Positive (A) 05/07/2021 06:05 PM    Leukocyte Esterase TRACE (A) 05/07/2021 06:05 PM    Epithelial cells FEW 05/07/2021 06:05 PM    Bacteria 4+ (A) 05/07/2021 06:05 PM    WBC 0-4 05/07/2021 06:05 PM    RBC 5-10 05/07/2021 06:05 PM         Medications Reviewed:     Current Facility-Administered Medications   Medication Dose Route Frequency    sodium chloride (NS) flush 5-40 mL  5-40 mL IntraVENous Q8H    sodium chloride (NS) flush 5-40 mL  5-40 mL IntraVENous PRN    naloxone (NARCAN) injection 0.4 mg  0.4 mg IntraVENous PRN    senna-docusate (PERICOLACE) 8.6-50 mg per tablet 1 Tab  1 Tab Oral BID    polyethylene glycol (MIRALAX) packet 17 g  17 g Oral DAILY    [START ON 5/10/2021] bisacodyL (DULCOLAX) suppository 10 mg  10 mg Rectal DAILY PRN    [Held by provider] amLODIPine (NORVASC) tablet 2.5 mg  2.5 mg Oral DAILY    aspirin delayed-release tablet 81 mg  81 mg Oral BID    busPIRone (BUSPAR) tablet 10 mg  10 mg Oral TID    loratadine (CLARITIN) tablet 10 mg  10 mg Oral DAILY    memantine (NAMENDA) tablet 10 mg  10 mg Oral BID    multivitamin, tx-iron-ca-min (THERA-M w/ IRON) tablet 1 Tab  1 Tab Oral DAILY    risperiDONE (RisperDAL) tablet 0.25 mg  0.25 mg Oral ACL    risperiDONE (RisperDAL) tablet 1 mg  1 mg Oral QPM    traZODone (DESYREL) tablet 50 mg  50 mg Oral QHS    sodium chloride (NS) flush 5-40 mL  5-40 mL IntraVENous Q8H    sodium chloride (NS) flush 5-40 mL  5-40 mL IntraVENous PRN    polyethylene glycol (MIRALAX) packet 17 g  17 g Oral DAILY PRN    promethazine (PHENERGAN) tablet 12.5 mg  12.5 mg Oral Q6H PRN    Or    ondansetron (ZOFRAN) injection 4 mg  4 mg IntraVENous Q6H PRN    acetaminophen (TYLENOL) tablet 650 mg  650 mg Oral Q8H    traMADoL (ULTRAM) tablet 50 mg  50 mg Oral Q6H PRN    morphine injection 2 mg  2 mg IntraVENous Q4H PRN    haloperidol lactate (HALDOL) injection 1 mg  1 mg IntraVENous Q6H PRN     ______________________________________________________________________  EXPECTED LENGTH OF STAY: - - -  ACTUAL LENGTH OF STAY:          2                 Ashley Rush DO

## 2021-05-10 LAB
ANION GAP SERPL CALC-SCNC: 4 MMOL/L (ref 5–15)
BUN SERPL-MCNC: 18 MG/DL (ref 6–20)
BUN/CREAT SERPL: 44 (ref 12–20)
CALCIUM SERPL-MCNC: 8 MG/DL (ref 8.5–10.1)
CHLORIDE SERPL-SCNC: 112 MMOL/L (ref 97–108)
CO2 SERPL-SCNC: 25 MMOL/L (ref 21–32)
CREAT SERPL-MCNC: 0.41 MG/DL (ref 0.55–1.02)
ERYTHROCYTE [DISTWIDTH] IN BLOOD BY AUTOMATED COUNT: 13.8 % (ref 11.5–14.5)
GLUCOSE SERPL-MCNC: 85 MG/DL (ref 65–100)
HCT VFR BLD AUTO: 33.7 % (ref 35–47)
HGB BLD-MCNC: 10.5 G/DL (ref 11.5–16)
MAGNESIUM SERPL-MCNC: 1.8 MG/DL (ref 1.6–2.4)
MCH RBC QN AUTO: 30.1 PG (ref 26–34)
MCHC RBC AUTO-ENTMCNC: 31.2 G/DL (ref 30–36.5)
MCV RBC AUTO: 96.6 FL (ref 80–99)
NRBC # BLD: 0 K/UL (ref 0–0.01)
NRBC BLD-RTO: 0 PER 100 WBC
PHOSPHATE SERPL-MCNC: 1.8 MG/DL (ref 2.6–4.7)
PLATELET # BLD AUTO: 167 K/UL (ref 150–400)
PMV BLD AUTO: 10.5 FL (ref 8.9–12.9)
POTASSIUM SERPL-SCNC: 3.7 MMOL/L (ref 3.5–5.1)
RBC # BLD AUTO: 3.49 M/UL (ref 3.8–5.2)
SODIUM SERPL-SCNC: 141 MMOL/L (ref 136–145)
WBC # BLD AUTO: 8.9 K/UL (ref 3.6–11)

## 2021-05-10 PROCEDURE — 97530 THERAPEUTIC ACTIVITIES: CPT

## 2021-05-10 PROCEDURE — 80048 BASIC METABOLIC PNL TOTAL CA: CPT

## 2021-05-10 PROCEDURE — 36415 COLL VENOUS BLD VENIPUNCTURE: CPT

## 2021-05-10 PROCEDURE — 83735 ASSAY OF MAGNESIUM: CPT

## 2021-05-10 PROCEDURE — 65270000029 HC RM PRIVATE

## 2021-05-10 PROCEDURE — 74011250637 HC RX REV CODE- 250/637: Performed by: HOSPITALIST

## 2021-05-10 PROCEDURE — 84100 ASSAY OF PHOSPHORUS: CPT

## 2021-05-10 PROCEDURE — 97530 THERAPEUTIC ACTIVITIES: CPT | Performed by: PHYSICAL THERAPIST

## 2021-05-10 PROCEDURE — 85027 COMPLETE CBC AUTOMATED: CPT

## 2021-05-10 PROCEDURE — 74011250637 HC RX REV CODE- 250/637: Performed by: INTERNAL MEDICINE

## 2021-05-10 RX ADMIN — DOCUSATE SODIUM 50 MG AND SENNOSIDES 8.6 MG 1 TABLET: 8.6; 5 TABLET, FILM COATED ORAL at 14:14

## 2021-05-10 RX ADMIN — ASPIRIN 81 MG: 81 TABLET, COATED ORAL at 14:15

## 2021-05-10 RX ADMIN — ACETAMINOPHEN 650 MG: 325 TABLET ORAL at 22:00

## 2021-05-10 RX ADMIN — LORATADINE 10 MG: 10 TABLET ORAL at 14:12

## 2021-05-10 RX ADMIN — BUSPIRONE HYDROCHLORIDE 10 MG: 10 TABLET ORAL at 14:13

## 2021-05-10 RX ADMIN — Medication 1 TABLET: at 14:12

## 2021-05-10 RX ADMIN — DOCUSATE SODIUM 50 MG AND SENNOSIDES 8.6 MG 1 TABLET: 8.6; 5 TABLET, FILM COATED ORAL at 22:00

## 2021-05-10 RX ADMIN — RISPERIDONE 1 MG: 0.5 TABLET ORAL at 22:00

## 2021-05-10 RX ADMIN — MEMANTINE HYDROCHLORIDE 10 MG: 10 TABLET ORAL at 14:12

## 2021-05-10 RX ADMIN — RISPERIDONE 0.25 MG: 0.5 TABLET ORAL at 14:14

## 2021-05-10 RX ADMIN — ACETAMINOPHEN 650 MG: 325 TABLET ORAL at 06:47

## 2021-05-10 RX ADMIN — POLYETHYLENE GLYCOL 3350 17 G: 17 POWDER, FOR SOLUTION ORAL at 14:12

## 2021-05-10 RX ADMIN — MEMANTINE HYDROCHLORIDE 10 MG: 10 TABLET ORAL at 22:00

## 2021-05-10 RX ADMIN — BUSPIRONE HYDROCHLORIDE 10 MG: 10 TABLET ORAL at 22:00

## 2021-05-10 RX ADMIN — ASPIRIN 81 MG: 81 TABLET, COATED ORAL at 18:00

## 2021-05-10 RX ADMIN — ACETAMINOPHEN 650 MG: 325 TABLET ORAL at 14:13

## 2021-05-10 NOTE — PROGRESS NOTES
Ortho Daily Progress Note      Patient: Kiesha Quarles                   MRN: [id number]  Sex: female  YOB: 1925           Age: 80 y.o.    2 Days Post-Op    Procedure(s): LEFT HIP HEMIARTHROPLASTY    Subjective: Severe dementia, unable to provide any meaningful information     Visit Vitals  BP (!) 141/73 (BP 1 Location: Right upper arm, BP Patient Position: At rest)   Pulse 98   Temp 99.6 °F (37.6 °C)   Resp 18   Wt 58.6 kg (129 lb 3.2 oz)   SpO2 95%   BMI 24.41 kg/m²        Lab Results:  HGB   Date/Time Value Ref Range Status   05/10/2021 03:31 AM 10.5 (L) 11.5 - 16.0 g/dL Final     INR   Date/Time Value Ref Range Status   11/09/2020 03:49 AM 1.0 0.9 - 1.1   Final     Comment:     A single therapeutic range for Vit K antagonists may not be optimal for all indications - see June, 2008 issue of Chest, American College of Chest Physicians Evidence-Based Clinical Practice Guidelines, 8th Edition.        Physical Exam:    GENERAL: 79yo female, confused, disoriented, cooperative, no distress  DRESSING: Aquacel dressing left hip c/d/i  SWELLING: mild  NEUROLOGICAL: moves toes slightly when asked, sensation intact to light touch  PULSE:yes   MOTION: abduction pillow in place, no obvious signs of pain with gentle left hip ROM  DVT Exam: No evidence of DVT seen on physical exam.      Plan:    DVT prophylaxis Aspirin 81mg bid x 1 mo  Weight bearing restrictionWBAT  Pain Control:stable, mild-to-moderate joint symptoms intermittently, reasonably well controlled by current meds  Dispo: return to [facility] assisted living when medically cleared    Laci HODGES [place], PA  5/10/2021   10:36 AM      .

## 2021-05-10 NOTE — PROGRESS NOTES
[address] [facility] Adult  Hospitalist Group                                                                                          Hospitalist Progress Note  [address] [facility], DO  Answering service: [phone] OR [phone] from in house phone        Date of Service:  5/10/2021  NAME:  Sera Almaraz  :  1925  MRN:  [id number]      Admission Summary:   80 y.o. female who presents with fall   Pt lives at [facility]. Has dementia. Son gave history   59-year-old female history of hypertension, dementia, degenerative joint disease presents to the emergency department for reported fall yesterday. Kaylah Singh resides at our [facility] and had mechanical fall yesterday.  X-ray performed at the nursing home today concerning for left femur fracture.  She denies any other injuries.  known to have dementiaShe is reportedly at her baseline mental status of ANO x2.  She only complains of pain during palpation or manipulation of her left leg. Interval history / Subjective: Follow up left femoral neck fracture. Patient seen and examined. Counting \"69, 70, 71, 72\" when I was in the room. Unable to provide accurate review of systems. Assessment & Plan:     Left femoral neck fracture:   -Ortho consulted  -s/p left hip arthroplasty   -continue scheduled tylenol, tramadol prn   -okay to continue IVFs for now, d/c   -DVT prophylaxis with aspirin   -bowel regimen   -PT/OT    Dementia:   -continue home medications    HTN: continue to hold amlodipine       Code status: DNR. DDNR on file.    DVT prophylaxis: aspirin     Care Plan discussed with: Nurse  Anticipated Disposition: SNF/LTC Medically stable for discharge  Anticipated Discharge: Greater than 48 hours     Hospital Problems  Date Reviewed: 2021          Codes Class Noted POA    Hip fracture [facility]) ICD-10-CM: B12.451G  ICD-9-CM: 820.8  2021 Unknown                Review of Systems:   Unable to obtain       Vital Signs:    Last 24hrs VS reviewed since prior progress note. Most recent are:  Visit Vitals  BP (!) 141/73 (BP 1 Location: Right upper arm, BP Patient Position: At rest)   Pulse 98   Temp 99.6 °F (37.6 °C)   Resp 18   Wt 58.6 kg (129 lb 3.2 oz)   SpO2 95%   BMI 24.41 kg/m²         Intake/Output Summary (Last 24 hours) at 5/10/2021 1441  Last data filed at 5/10/2021 9823  Gross per 24 hour   Intake 240 ml   Output 1200 ml   Net -960 ml        Physical Examination:     I had a face to face encounter with this patient and independently examined them on 5/10/2021 as outlined below:          Constitutional:  No acute distress, cooperative, pleasant, demented    ENT:  Oral mucosa moist, oropharynx benign. Resp:  CTA bilaterally. No wheezing/rhonchi/rales. No accessory muscle use   CV:  Regular rhythm, normal rate, no murmurs, gallops, rubs    GI:  Soft, non distended, non tender. normoactive bowel sounds, no hepatosplenomegaly     Musculoskeletal:  No edema, warm, 2+ pulses throughout    Neurologic:  Moves all extremities          Data Review:    Review and/or order of clinical lab test  Review and/or order of tests in the radiology section of CPT  Review and/or order of tests in the medicine section of CPT      Labs:     Recent Labs     05/10/21  0331 05/09/21  0216   WBC 8.9 9.0   HGB 10.5* 10.8*   HCT 33.7* 34.5*    149*     Recent Labs     05/10/21  0331 05/09/21 0216 05/08/21 0416    140 137   K 3.7 4.2 3.7   * 111* 106   CO2 25 25 27   BUN 18 22* 13   CREA 0.41* 0.71 0.54*   GLU 85 115* 108*   CA 8.0* 7.6* 8.8   MG 1.8 1.9 2.0   PHOS 1.8* 3.1 2.8     Recent Labs     05/08/21  0416   ALT 20   AP 71   TBILI 0.7   TP 6.4   ALB 3.1*   GLOB 3.3     No results for input(s): INR, PTP, APTT, INREXT, INREXT in the last 72 hours. No results for input(s): FE, TIBC, PSAT, FERR in the last 72 hours. No results found for: FOL, RBCF   No results for input(s): PH, PCO2, PO2 in the last 72 hours.   No results for input(s): CPK, CKNDX, TROIQ in the last 72 hours.     No lab exists for component: CPKMB  No results found for: CHOL, CHOLX, CHLST, CHOLV, HDL, HDLP, LDL, LDLC, DLDLP, TGLX, TRIGL, TRIGP, CHHD, CHHDX  Lab Results   Component Value Date/Time    Glucose (POC) 121 (H) 05/09/2021 03:56 PM    Glucose (POC) 94 05/09/2021 12:18 PM    Glucose (POC) 136 (H) 03/15/2011 06:52 PM     Lab Results   Component Value Date/Time    Color YELLOW/STRAW 05/07/2021 06:05 PM    Appearance CLEAR 05/07/2021 06:05 PM    Specific gravity 1.011 05/07/2021 06:05 PM    pH (UA) 7.5 05/07/2021 06:05 PM    Protein Negative 05/07/2021 06:05 PM    Glucose Negative 05/07/2021 06:05 PM    Ketone Negative 05/07/2021 06:05 PM    Bilirubin Negative 05/07/2021 06:05 PM    Urobilinogen 1.0 05/07/2021 06:05 PM    Nitrites Positive (A) 05/07/2021 06:05 PM    Leukocyte Esterase TRACE (A) 05/07/2021 06:05 PM    Epithelial cells FEW 05/07/2021 06:05 PM    Bacteria 4+ (A) 05/07/2021 06:05 PM    WBC 0-4 05/07/2021 06:05 PM    RBC 5-10 05/07/2021 06:05 PM         Medications Reviewed:     Current Facility-Administered Medications   Medication Dose Route Frequency    sodium chloride (NS) flush 5-40 mL  5-40 mL IntraVENous Q8H    sodium chloride (NS) flush 5-40 mL  5-40 mL IntraVENous PRN    naloxone (NARCAN) injection 0.4 mg  0.4 mg IntraVENous PRN    senna-docusate (PERICOLACE) 8.6-50 mg per tablet 1 Tab  1 Tab Oral BID    polyethylene glycol (MIRALAX) packet 17 g  17 g Oral DAILY    bisacodyL (DULCOLAX) suppository 10 mg  10 mg Rectal DAILY PRN    [Held by provider] amLODIPine (NORVASC) tablet 2.5 mg  2.5 mg Oral DAILY    aspirin delayed-release tablet 81 mg  81 mg Oral BID    busPIRone (BUSPAR) tablet 10 mg  10 mg Oral TID    loratadine (CLARITIN) tablet 10 mg  10 mg Oral DAILY    memantine (NAMENDA) tablet 10 mg  10 mg Oral BID    multivitamin, tx-iron-ca-min (THERA-M w/ IRON) tablet 1 Tab  1 Tab Oral DAILY    risperiDONE (RisperDAL) tablet 0.25 mg  0.25 mg Oral ACL    risperiDONE (RisperDAL) tablet 1 mg  1 mg Oral QPM    traZODone (DESYREL) tablet 50 mg  50 mg Oral QHS    sodium chloride (NS) flush 5-40 mL  5-40 mL IntraVENous Q8H    sodium chloride (NS) flush 5-40 mL  5-40 mL IntraVENous PRN    polyethylene glycol (MIRALAX) packet 17 g  17 g Oral DAILY PRN    promethazine (PHENERGAN) tablet 12.5 mg  12.5 mg Oral Q6H PRN    Or    ondansetron (ZOFRAN) injection 4 mg  4 mg IntraVENous Q6H PRN    acetaminophen (TYLENOL) tablet 650 mg  650 mg Oral Q8H    traMADoL (ULTRAM) tablet 50 mg  50 mg Oral Q6H PRN    morphine injection 2 mg  2 mg IntraVENous Q4H PRN    haloperidol lactate (HALDOL) injection 1 mg  1 mg IntraVENous Q6H PRN     ______________________________________________________________________  EXPECTED LENGTH OF STAY: 4d 2h  ACTUAL LENGTH OF STAY:          [address] [facility],

## 2021-05-10 NOTE — PROGRESS NOTES
1030:  RN addressed to MD that there was not an order present to discontinue maki catheter. Per MD leave maki catheter in today then re-assess this tomorrow. Will continue to monitor. 2027:  Bedside shift change report given to Kevin Wise RN (oncoming nurse) by Armando Guerra RN (offgoing nurse). Report included the following information SBAR, Kardex, Intake/Output, MAR and Recent Results.

## 2021-05-10 NOTE — PROGRESS NOTES
Transition of Care Plan  1. RUR- 20%  2. DISPOSITION: TBD/subject to change pending recommendations; pending medical progression   -Anticipate patient to transition to [address] [place] [address] for skilled services once medically ready for discharge.   - PT/OT consulted. Recommending SNF. Referral submitted to facility via allscripts.   -Orthopedic Surgery Following. 3. F/U with PCP/Specialist    4. Transport: S    CM will continue to follow and assist with NELDA needs as they arise. Reason for Admission:  Hip fracture ([address]                  RUR Score:   20%                  Plan for utilizing home health:  Not at this time. PCP: YES First and Last name:  Ferny Valdez .[phone]   Name of Practice: [address] [place] [address]   Are you a current patient: Yes/No: yes   Approximate date of last visit: Unknown   Can you participate in a virtual visit with your PCP: NO                    Current Advanced Directive/Advance Care Plan: Full Code      Healthcare Decision Maker:              Primary Decision Maker: Carmen/Son,Mainor - Child - [phone]                                  80year old female, AOX2 with history of dementia. Is a LTC resident at [facility] [employer] (memory care). At baseline was ambulatory with walker. Requires assistance with ADL's and IADL's. Medications administered at facility. Insurance verified: Medicare A&B/ [facility]. Care Management Interventions  PCP Verified by CM:  Yes  Palliative Care Criteria Met (RRAT>21 & CHF Dx)?: No  Mode of Transport at Discharge: BLS  Transition of Care Consult (CM Consult): Discharge Planning  Discharge Durable Medical Equipment: No  Physical Therapy Consult: Yes  Occupational Therapy Consult: Yes  Speech Therapy Consult: No  Current Support Network: [facility] [address] [place] [address])  Confirm Follow Up Transport: Other (see comment)(S)  Discharge Location  Discharge Placement: Skilled nursing facility([facility]) Shanda Meigs, MSW/CRM  Care Management  6:25 PM

## 2021-05-10 NOTE — PROGRESS NOTES
Problem: Mobility Impaired (Adult and Pediatric)  Goal: *Acute Goals and Plan of Care (Insert Text)  Description: FUNCTIONAL STATUS PRIOR TO ADMISSION: pt unable to provide any history. Per chart review, pt was ambulatory prior to admission. HOME SUPPORT PRIOR TO ADMISSION: The patient lived in a SNF per chart review. Physical Therapy Goals  Initiated 5/9/2021    1. Patient will move from supine to sit and sit to supine , scoot up and down, and roll side to side in bed with moderate assistance  within 4 days. 2. Patient will perform sit to stand with moderate assistance  within 4 days. 3. Patient will ambulate with moderate assistance  for 10 feet with the least restrictive device within 4 days. 4. Patient will adhere to routine precautions per protocol with assist within 4 days. 5. Patient will perform routine hip home exercise program per protocol with moderate assistance  within 4 days. Outcome: Progressing Towards Goal   PHYSICAL THERAPY TREATMENT  Patient: Milagros Winkler (42 y.o. female)  Date: 5/10/2021  Diagnosis: Hip fracture ([facility] [S72.009A] <principal problem not specified>  Procedure(s) (LRB):  LEFT HIP HEMIARTHROPLASTY (Left) 2 Days Post-Op  Precautions: Fall, WBAT, Bed Alarm, Total hip, Skin([place], wedge pillow)  Chart, physical therapy assessment, plan of care and goals were reviewed. ASSESSMENT  Patient continues with skilled PT services and is progressing very slowly towards goals. Patient overall needing totalA x 2 for supine to sit. Patient resistant to movement at first and scared with mobility. Once sitting EOB is able to sit with fluctuating level of assist.  Initially needing maxA but improves to supervision-CGA. Unable to have any meaningful conversation and no carryover with any mobility. Continue to recommend SNF rehab with transition to LTC.        Other factors to consider for discharge: advanced dementia, no carryover with mobility training, confused PLAN :  Patient continues to benefit from skilled intervention to address the above impairments. Continue treatment per established plan of care. to address goals. Recommendation for discharge: (in order for the patient to meet his/her long term goals)  Therapy up to 5 days/week in SNF setting      IF patient discharges home will need the following DME: to be determined (TBD)       SUBJECTIVE:   Patient stated [facility] tells me to do it.     OBJECTIVE DATA SUMMARY:   Critical Behavior:  Neurologic State: Alert, Confused  Orientation Level: Oriented to person  Cognition: Memory loss, Decreased attention/concentration  Safety/Judgement: Decreased awareness of environment, Decreased awareness of need for assistance, Decreased awareness of need for safety, Decreased insight into deficits  Functional Mobility Training:  Bed Mobility:     Supine to Sit: Total assistance;Assist x2  Sit to Supine: Total assistance;Assist x2           Transfers:   Not tested                                Balance:  Sitting: Impaired  Sitting - Static: Fair (occasional)  Sitting - Dynamic: Poor (constant support)  Ambulation/Gait Training:             Pain Rating:  Reports pain but does not rate    Activity Tolerance:   Fair and requires rest breaks    After treatment patient left in no apparent distress:   Supine in bed, Call bell within reach, Bed / chair alarm activated, and Side rails x 3    COMMUNICATION/COLLABORATION:   The patients plan of care was discussed with: Physical therapist, Occupational therapist, and Registered nurse.      Ambika Fitzgerald PT, DPT   Time Calculation: 24 mins

## 2021-05-10 NOTE — PROGRESS NOTES
Problem: Self Care Deficits Care Plan (Adult)  Goal: *Acute Goals and Plan of Care (Insert Text)  Description:   FUNCTIONAL STATUS PRIOR TO ADMISSION: Pt poor historian; therefore, true PLOF undetermined. Chart indicates pt resides at LT and was ambulatory at [facility] at baseline. HOME SUPPORT: The patient lived with LTC staff to provide unknown amount of assist.    Occupational Therapy Goals  Initiated 5/9/2021  1. Patient will perform inclined self-feeding with supervision/set-up within 4 day(s). 2.  Patient will perform inclined grooming with minimal assistance within 4 day(s). 3.  Patient will perform inclined upper body dressing with moderate assistance  within 4 day(s). 4.  Patient will perform toilet transfers, from EOB to [facility], with maximal assistance within 4 day(s). 5.  Patient will perform all aspects of toileting, seated BSC, with maximal assistance within 4 day(s). Outcome: Not Progressing Towards Goal    OCCUPATIONAL THERAPY TREATMENT  Patient: Molly Merrill (89 y.o. female)  Date: 5/10/2021  Diagnosis: Hip fracture ([facility] Joelle Hoover <principal problem not specified>  Procedure(s) (LRB):  LEFT HIP HEMIARTHROPLASTY (Left) 2 Days Post-Op  Precautions: Fall, WBAT, Bed Alarm, Total hip, Skin([place], wedge pillow)  Chart, occupational therapy assessment, plan of care, and goals were reviewed. ASSESSMENT  Patient continues with skilled OT services and is not progressing towards goals. Pt's decreased cognition, with poor direction following and poor therapeutic carry over continue to limit pt's safe ADL engagement, with pt benefiting from Total Ax2 to/from EOB and pt noted to confabulate throughout entirety of therapeutic session. Pt continues to benefit from skilled OT to address functional deficits during acute hospitalization, with reporting therapist believing pt would benefit from SNF rehab and potential transfer to LTC. Current Level of Function Impacting Discharge (ADLs):  Total A    Other factors to consider for discharge: Confusion, Advanced age, poor therapeutic carry over         PLAN :  Patient continues to benefit from skilled intervention to address the above impairments. Continue treatment per established plan of care to address goals. Recommend with staff: Frequent positional changes, Assist with self-feeding    Recommend next OT session: POC progression    Recommendation for discharge: (in order for the patient to meet his/her long term goals)  Therapy up to 5 days/week in SNF setting with potential transfer to LTC    This discharge recommendation:  Has not yet been discussed the attending provider and/or case management    IF patient discharges home will need the following DME: TBD       SUBJECTIVE:   Patient stated Romana Ribera was there, he gave me that.     OBJECTIVE DATA SUMMARY:   Cognitive/Behavioral Status:  Neurologic State: Alert;Confused  Orientation Level: Oriented to person;Disoriented to place; Disoriented to situation;Disoriented to time  Cognition: Memory loss;Poor safety awareness;Decreased attention/concentration;Decreased command following  Perception: Cues to maintain midline in sitting  Perseveration: No perseveration noted  Safety/Judgement: Decreased awareness of environment;Decreased awareness of need for assistance;Decreased awareness of need for safety;Decreased insight into deficits    Functional Mobility and Transfers for ADLs:  Bed Mobility:  Supine to Sit: Total assistance;Assist x2  Sit to Supine:  Total assistance;Assist x2    Balance:  Sitting: Impaired  Sitting - Static: Fair (occasional)  Sitting - Dynamic: Poor (constant support)    ADL Intervention:  Cognitive Retraining  Safety/Judgement: Decreased awareness of environment;Decreased awareness of need for assistance;Decreased awareness of need for safety;Decreased insight into deficits    Pain:  Indicated hip pain with movement, did not quantify; nursing aware and following    Activity Tolerance: Poor and requires frequent rest breaks    After treatment patient left in no apparent distress:   Supine in bed, Call bell within reach, Bed / chair alarm activated, and Side rails x 3    COMMUNICATION/COLLABORATION:   The patients plan of care was discussed with: Physical therapist and Registered nurse.      Gisselle Conti OT  Time Calculation: 24 mins

## 2021-05-10 NOTE — PROGRESS NOTES
Bedside and Verbal shift change report given to Tommy RN (oncoming nurse) by Eder Munguia RN (offgoing nurse). Report included the following information SBAR.

## 2021-05-11 LAB
ANION GAP SERPL CALC-SCNC: 4 MMOL/L (ref 5–15)
BUN SERPL-MCNC: 26 MG/DL (ref 6–20)
BUN/CREAT SERPL: 60 (ref 12–20)
CALCIUM SERPL-MCNC: 8.1 MG/DL (ref 8.5–10.1)
CHLORIDE SERPL-SCNC: 111 MMOL/L (ref 97–108)
CO2 SERPL-SCNC: 25 MMOL/L (ref 21–32)
COVID-19 RAPID TEST, COVR: NOT DETECTED
CREAT SERPL-MCNC: 0.43 MG/DL (ref 0.55–1.02)
ERYTHROCYTE [DISTWIDTH] IN BLOOD BY AUTOMATED COUNT: 13.7 % (ref 11.5–14.5)
GLUCOSE SERPL-MCNC: 100 MG/DL (ref 65–100)
HCT VFR BLD AUTO: 32.8 % (ref 35–47)
HGB BLD-MCNC: 10.5 G/DL (ref 11.5–16)
MAGNESIUM SERPL-MCNC: 2.2 MG/DL (ref 1.6–2.4)
MCH RBC QN AUTO: 30.4 PG (ref 26–34)
MCHC RBC AUTO-ENTMCNC: 32 G/DL (ref 30–36.5)
MCV RBC AUTO: 95.1 FL (ref 80–99)
NRBC # BLD: 0 K/UL (ref 0–0.01)
NRBC BLD-RTO: 0 PER 100 WBC
PHOSPHATE SERPL-MCNC: 2.3 MG/DL (ref 2.6–4.7)
PLATELET # BLD AUTO: 194 K/UL (ref 150–400)
PMV BLD AUTO: 10.2 FL (ref 8.9–12.9)
POTASSIUM SERPL-SCNC: 3.7 MMOL/L (ref 3.5–5.1)
RBC # BLD AUTO: 3.45 M/UL (ref 3.8–5.2)
SODIUM SERPL-SCNC: 140 MMOL/L (ref 136–145)
SOURCE, COVRS: NORMAL
WBC # BLD AUTO: 9 K/UL (ref 3.6–11)

## 2021-05-11 PROCEDURE — 87635 SARS-COV-2 COVID-19 AMP PRB: CPT

## 2021-05-11 PROCEDURE — 74011250636 HC RX REV CODE- 250/636: Performed by: INTERNAL MEDICINE

## 2021-05-11 PROCEDURE — 36415 COLL VENOUS BLD VENIPUNCTURE: CPT

## 2021-05-11 PROCEDURE — 74011250637 HC RX REV CODE- 250/637: Performed by: HOSPITALIST

## 2021-05-11 PROCEDURE — 51798 US URINE CAPACITY MEASURE: CPT

## 2021-05-11 PROCEDURE — 97535 SELF CARE MNGMENT TRAINING: CPT

## 2021-05-11 PROCEDURE — 65270000029 HC RM PRIVATE

## 2021-05-11 PROCEDURE — 97110 THERAPEUTIC EXERCISES: CPT

## 2021-05-11 PROCEDURE — 84100 ASSAY OF PHOSPHORUS: CPT

## 2021-05-11 PROCEDURE — 77030005513 HC CATH URETH FOL11 MDII -B

## 2021-05-11 PROCEDURE — 85027 COMPLETE CBC AUTOMATED: CPT

## 2021-05-11 PROCEDURE — 83735 ASSAY OF MAGNESIUM: CPT

## 2021-05-11 PROCEDURE — 80048 BASIC METABOLIC PNL TOTAL CA: CPT

## 2021-05-11 PROCEDURE — 97530 THERAPEUTIC ACTIVITIES: CPT

## 2021-05-11 RX ORDER — SODIUM CHLORIDE 9 MG/ML
75 INJECTION, SOLUTION INTRAVENOUS CONTINUOUS
Status: DISCONTINUED | OUTPATIENT
Start: 2021-05-11 | End: 2021-05-12 | Stop reason: HOSPADM

## 2021-05-11 RX ADMIN — SODIUM CHLORIDE 75 ML/HR: 9 INJECTION, SOLUTION INTRAVENOUS at 12:06

## 2021-05-11 RX ADMIN — BUSPIRONE HYDROCHLORIDE 10 MG: 10 TABLET ORAL at 21:11

## 2021-05-11 RX ADMIN — RISPERIDONE 1 MG: 0.5 TABLET ORAL at 18:50

## 2021-05-11 RX ADMIN — BUSPIRONE HYDROCHLORIDE 10 MG: 10 TABLET ORAL at 16:27

## 2021-05-11 RX ADMIN — MEMANTINE HYDROCHLORIDE 10 MG: 10 TABLET ORAL at 18:50

## 2021-05-11 RX ADMIN — ACETAMINOPHEN 650 MG: 325 TABLET ORAL at 21:11

## 2021-05-11 RX ADMIN — ACETAMINOPHEN 650 MG: 325 TABLET ORAL at 06:12

## 2021-05-11 RX ADMIN — ASPIRIN 81 MG: 81 TABLET, COATED ORAL at 18:52

## 2021-05-11 RX ADMIN — Medication 10 ML: at 06:11

## 2021-05-11 RX ADMIN — TRAZODONE HYDROCHLORIDE 50 MG: 50 TABLET ORAL at 21:12

## 2021-05-11 RX ADMIN — Medication 10 ML: at 13:59

## 2021-05-11 NOTE — PROGRESS NOTES
[address] [facility] Adult  Hospitalist Group                                                                                          Hospitalist Progress Note  [address] [facility], DO  Answering service: [phone] OR [phone] from in house phone        Date of Service:  2021  NAME:  Colby Bojorquez  :  1925  MRN:  [id number]      Admission Summary:   80 y.o. female who presents with fall   Pt lives at [facility] of our [facility]. Has dementia. Son gave history   51-year-old female history of hypertension, dementia, degenerative joint disease presents to the emergency department for reported fall yesterday. [facility] resides at our [facility] and had mechanical fall yesterday.  X-ray performed at the nursing home today concerning for left femur fracture.  She denies any other injuries.  known to have dementiaShe is reportedly at her baseline mental status of ANO x2.  She only complains of pain during palpation or manipulation of her left leg. Interval history / Subjective: Follow up left femoral neck fracture. Patient seen and examined. Continues to be drowsy, minimal oral intake. Can answer her name and name family members. Maki removed 5/10, continues to have dry briefs, minimal urine on bladder scans. Max assist to sit up with physical therapy. Assessment & Plan:     Left femoral neck fracture:   -Ortho consulted  -s/p left hip arthroplasty   -continue scheduled tylenol, tramadol prn   -DVT prophylaxis with aspirin   -bowel regimen   -PT/OT- needing max assistance    Dehydration:   -from minimal oral intake, will start IVFs and monitor output    Urinary retention:   -dehydration contributing. May need maki catheter reinsertion     Dementia:   -continue home medications    HTN: continue to hold amlodipine     Patient at high risk for decline with minimal interaction since surgery. Unclear how patient will do long term. Will discuss with family. Code status: DNR. DDNR on file.    DVT prophylaxis: aspirin     Care Plan discussed with: Nurse  Anticipated Disposition: SNF/LTC   Anticipated Discharge: Greater than 48 hours     Hospital Problems  Date Reviewed: 5/8/2021          Codes Class Noted POA    Hip fracture [facility]) ICD-10-CM: D68.219A  ICD-9-CM: 820.8  5/7/2021 Unknown                Review of Systems:   Unable to obtain       Vital Signs:    Last 24hrs VS reviewed since prior progress note. Most recent are:  Visit Vitals  /61 (BP 1 Location: Left upper arm, BP Patient Position: At rest)   Pulse 61   Temp 98.2 °F (36.8 °C)   Resp 14   Wt 58.6 kg (129 lb 3.2 oz)   SpO2 96%   BMI 24.41 kg/m²         Intake/Output Summary (Last 24 hours) at 5/11/2021 1402  Last data filed at 5/10/2021 1800  Gross per 24 hour   Intake 240 ml   Output    Net 240 ml        Physical Examination:     I had a face to face encounter with this patient and independently examined them on 5/11/2021 as outlined below:          Constitutional:  No acute distress, cooperative, pleasant, demented, closes eyes when speaking    ENT:  Oral mucosa moist, oropharynx benign. Resp:  CTA bilaterally. No wheezing/rhonchi/rales. No accessory muscle use   CV:  Regular rhythm, normal rate, no murmurs, gallops, rubs    GI:  Soft, non distended, non tender.  normoactive bowel sounds, no hepatosplenomegaly     Musculoskeletal:  No edema, warm, 2+ pulses throughout    Neurologic:  Moves all extremities, can follow commands, alert to name only           Data Review:    Review and/or order of clinical lab test  Review and/or order of tests in the radiology section of CPT  Review and/or order of tests in the medicine section of CPT      Labs:     Recent Labs     05/11/21  0605 05/10/21  0331   WBC 9.0 8.9   HGB 10.5* 10.5*   HCT 32.8* 33.7*    167     Recent Labs     05/11/21  0605 05/10/21  0331 05/09/21  0216    141 140   K 3.7 3.7 4.2   * 112* 111*   CO2 25 25 25   BUN 26* 18 22*   CREA 0.43* 0.41* 0.71    85 115*   CA 8.1* 8.0* 7.6*   MG 2.2 1.8 1.9   PHOS 2.3* 1.8* 3.1     No results for input(s): ALT, AP, TBIL, TBILI, TP, ALB, GLOB, GGT, AML, LPSE in the last 72 hours. No lab exists for component: SGOT, GPT, AMYP, HLPSE  No results for input(s): INR, PTP, APTT, INREXT, INREXT in the last 72 hours. No results for input(s): FE, TIBC, PSAT, FERR in the last 72 hours. No results found for: FOL, RBCF   No results for input(s): PH, PCO2, PO2 in the last 72 hours. No results for input(s): CPK, CKNDX, TROIQ in the last 72 hours.     No lab exists for component: CPKMB  No results found for: CHOL, CHOLX, CHLST, CHOLV, HDL, HDLP, LDL, LDLC, DLDLP, TGLX, TRIGL, TRIGP, CHHD, CHHDX  Lab Results   Component Value Date/Time    Glucose (POC) 121 (H) 05/09/2021 03:56 PM    Glucose (POC) 94 05/09/2021 12:18 PM    Glucose (POC) 136 (H) 03/15/2011 06:52 PM     Lab Results   Component Value Date/Time    Color YELLOW/STRAW 05/07/2021 06:05 PM    Appearance CLEAR 05/07/2021 06:05 PM    Specific gravity 1.011 05/07/2021 06:05 PM    pH (UA) 7.5 05/07/2021 06:05 PM    Protein Negative 05/07/2021 06:05 PM    Glucose Negative 05/07/2021 06:05 PM    Ketone Negative 05/07/2021 06:05 PM    Bilirubin Negative 05/07/2021 06:05 PM    Urobilinogen 1.0 05/07/2021 06:05 PM    Nitrites Positive (A) 05/07/2021 06:05 PM    Leukocyte Esterase TRACE (A) 05/07/2021 06:05 PM    Epithelial cells FEW 05/07/2021 06:05 PM    Bacteria 4+ (A) 05/07/2021 06:05 PM    WBC 0-4 05/07/2021 06:05 PM    RBC 5-10 05/07/2021 06:05 PM         Medications Reviewed:     Current Facility-Administered Medications   Medication Dose Route Frequency    0.9% sodium chloride infusion  75 mL/hr IntraVENous CONTINUOUS    sodium chloride (NS) flush 5-40 mL  5-40 mL IntraVENous Q8H    sodium chloride (NS) flush 5-40 mL  5-40 mL IntraVENous PRN    naloxone (NARCAN) injection 0.4 mg  0.4 mg IntraVENous PRN    senna-docusate (PERICOLACE) 8.6-50 mg per tablet 1 Tab  1 Tab Oral BID  polyethylene glycol (MIRALAX) packet 17 g  17 g Oral DAILY    bisacodyL (DULCOLAX) suppository 10 mg  10 mg Rectal DAILY PRN    [Held by provider] amLODIPine (NORVASC) tablet 2.5 mg  2.5 mg Oral DAILY    aspirin delayed-release tablet 81 mg  81 mg Oral BID    busPIRone (BUSPAR) tablet 10 mg  10 mg Oral TID    loratadine (CLARITIN) tablet 10 mg  10 mg Oral DAILY    memantine (NAMENDA) tablet 10 mg  10 mg Oral BID    multivitamin, tx-iron-ca-min (THERA-M w/ IRON) tablet 1 Tab  1 Tab Oral DAILY    risperiDONE (RisperDAL) tablet 0.25 mg  0.25 mg Oral ACL    risperiDONE (RisperDAL) tablet 1 mg  1 mg Oral QPM    traZODone (DESYREL) tablet 50 mg  50 mg Oral QHS    sodium chloride (NS) flush 5-40 mL  5-40 mL IntraVENous Q8H    sodium chloride (NS) flush 5-40 mL  5-40 mL IntraVENous PRN    polyethylene glycol (MIRALAX) packet 17 g  17 g Oral DAILY PRN    promethazine (PHENERGAN) tablet 12.5 mg  12.5 mg Oral Q6H PRN    Or    ondansetron (ZOFRAN) injection 4 mg  4 mg IntraVENous Q6H PRN    acetaminophen (TYLENOL) tablet 650 mg  650 mg Oral Q8H    traMADoL (ULTRAM) tablet 50 mg  50 mg Oral Q6H PRN    morphine injection 2 mg  2 mg IntraVENous Q4H PRN    haloperidol lactate (HALDOL) injection 1 mg  1 mg IntraVENous Q6H PRN     ______________________________________________________________________  EXPECTED LENGTH OF STAY: 4d 2h  ACTUAL LENGTH OF STAY:          [address], [place]

## 2021-05-11 NOTE — PROGRESS NOTES
Orthopedic Progress Note    S: Pt condition limits hx    O: NAD, respirations unlabored; Dressings CDI; thigh soft, no edema, no posterior calf ttp; Unable to follow instructions to test strength, moves foot sporadically, SILT; Cap refill brisk, foot warm, DP2+    Patient Vitals for the past 4 hrs:   Temp Pulse BP   05/11/21 1021 98.2 °F (36.8 °C) 61 126/61     Recent Labs     05/11/21  0605 05/10/21  0331   HGB 10.5* 10.5*   HCT 32.8* 33.7*    167   BUN 26* 18   CREA 0.43* 0.41*   K 3.7 3.7    141            A/P:  Procedure: Procedure(s):  LEFT HIP HEMIARTHROPLASTY  Post Op day: 3 Days Post-Op    - DVT ppx - ASA 81mg BID x 28 days; SCDs  - PT/OT - WBAT, Posterior hip precautions  - Pain - Tylenol angella, Oxycodone PRN; Ice, Elevation, Ace wrap as needed  - Dressing - Leave in place if it remains dry and intact; change as needed for saturation with dry sterile island dressing  - Dispo - return to assisted living; Ortho stable for discharge; needs f/u in 3 weeks with  ; refer to DC instructions for further details. Will sign off; call with questions or re-consult at 5/21 if patient still admitted.      MOHINDER Lepe  Orthopedic Trauma Service  [facility]

## 2021-05-11 NOTE — PROGRESS NOTES
Problem: Mobility Impaired (Adult and Pediatric)  Goal: *Acute Goals and Plan of Care (Insert Text)  Description: FUNCTIONAL STATUS PRIOR TO ADMISSION: pt unable to provide any history. Per chart review, pt was ambulatory prior to admission. HOME SUPPORT PRIOR TO ADMISSION: The patient lived in a SNF per chart review. Physical Therapy Goals  Initiated 5/9/2021    1. Patient will move from supine to sit and sit to supine , scoot up and down, and roll side to side in bed with moderate assistance  within 4 days. 2. Patient will perform sit to stand with moderate assistance  within 4 days. 3. Patient will ambulate with moderate assistance  for 10 feet with the least restrictive device within 4 days. 4. Patient will adhere to routine precautions per protocol with assist within 4 days. 5. Patient will perform routine hip home exercise program per protocol with moderate assistance  within 4 days. Outcome: Progressing Towards Goal   PHYSICAL THERAPY TREATMENT  Patient: Kwaku Garcia (89 y.o. female)  Date: 5/11/2021  Diagnosis: [facility] [S72.009A] <principal problem not specified>  Procedure(s) (LRB):  LEFT HIP HEMIARTHROPLASTY (Left) 3 Days Post-Op  Precautions: Fall, WBAT, Bed Alarm, Total hip, Skin([place], wedge pillow)  Chart, physical therapy assessment, plan of care and goals were reviewed. ASSESSMENT  Patient continues with skilled PT services. This afternoon, pt was asleep upon arrival, making minimal responses  to questions and eyes remaining closed throughout most of treatment. When pt did speak, pt agreeable to PROM. Completed 2 sets of PROM to LLE and x1 set PROM to RLE. At sessions end, pt more awake and opening eyes and speaking to daughter. Pt was able to wash/wipe her face down w/wet cloth in R hand and use a swab to \"brush\" her teeth. She reports she is hungry and her daughter placed order for dinner. Pt's daughter reports this is the most pt has been awake all day.  PT to continue to follow. Pt's daughter reports pt may d/c to SNF tomorrow- will follow. Current Level of Function Impacting Discharge (mobility/balance): Total Ax2 for bed mobility             PLAN :  Patient continues to benefit from skilled intervention to address the above impairments. Continue treatment per established plan of care. to address goals. Recommendation for discharge: (in order for the patient to meet his/her long term goals)  Therapy up to 5 days/week in SNF setting    This discharge recommendation:  Has been made in collaboration with the attending provider and/or case management    IF patient discharges home will need the following DME: to be determined (TBD)       SUBJECTIVE:   Patient stated \"That looks like my daughter.     OBJECTIVE DATA SUMMARY:   Critical Behavior:  Neurologic State: Confused, Drowsy  Orientation Level: Oriented to person  Cognition: Memory loss, Decreased attention/concentration  Safety/Judgement: Decreased awareness of environment, Decreased awareness of need for assistance, Decreased awareness of need for safety, Decreased insight into deficits  Functional Mobility Training:  Bed Mobility:      Total Ax2        Ambulation/Gait Training:                    Right Side Weight Bearing: Full  Left Side Weight Bearing: As tolerated      Therapeutic Exercises:   Supine (PROM): Heel slides, hip ABD/ADD, gentle IR/ER (x2 sets, LLE, x1 set RLE). Pain Rating:  No verbal c/o pain    Activity Tolerance:   Good    After treatment patient left in no apparent distress:   Call bell within reach, Caregiver / family present, and Side rails x 3    COMMUNICATION/COLLABORATION:   The patients plan of care was discussed with: Registered nurse.      Danelle Cash,PTA   Time Calculation: 29 mins

## 2021-05-11 NOTE — PROGRESS NOTES
NELDA: Plan for discharge to [address], possibly tomorrow per MD. Rapid covid test will be needed prior to discharge. Chart reviewed. RONIT spoke with Dr. Mallory Feldman. She would like to speak with the MD at [facility]. RONIT called and spoke with Gee Machado at [facility], she will coordinate a call with their MD with Dr. Edi Payton to call Dr. Mallory Feldman.     MACIEL Lipscomb/CRM

## 2021-05-11 NOTE — PROGRESS NOTES
Problem: Self Care Deficits Care Plan (Adult)  Goal: *Acute Goals and Plan of Care (Insert Text)  Description:   FUNCTIONAL STATUS PRIOR TO ADMISSION: Per family report, patient is a resident in memory care unit at [facility]. She was ambulatory with RW, received assistance with all self care tasks     HOME SUPPORT: facility staff assists with all ADL. Patient required at minimum set up/supervision with cues- even for feeding tasks. Occupational Therapy Goals  Initiated 5/9/2021  1. Patient will perform inclined self-feeding with supervision/set-up within 4 day(s). 2.  Patient will perform inclined grooming with minimal assistance within 4 day(s). 3.  Patient will perform inclined upper body dressing with moderate assistance  within 4 day(s). 4.  Patient will perform toilet transfers, from [facility] to [facility], with maximal assistance within 4 day(s). 5.  Patient will perform all aspects of toileting, seated BSC, with maximal assistance within 4 day(s). Outcome: Not Progressing Towards Goal  OCCUPATIONAL THERAPY TREATMENT  Patient: Kate Conn (43 y.o. female)  Date: 5/11/2021  Diagnosis: Hip fracture ([facility] Estrella Peoples <principal problem not specified>  Procedure(s) (LRB):  LEFT HIP HEMIARTHROPLASTY (Left) 3 Days Post-Op  Precautions: Fall, WBAT, Bed Alarm, Total hip, Skin([place], wedge pillow)  Chart, occupational therapy assessment, plan of care, and goals were reviewed. ASSESSMENT  Patient continues with skilled OT services and is not progressing towards goals. Patient remained with eyes closed during session. Family present and was attempting to feed patient. Patient provided with tactile and verbal cues to awaken, hand over hand assistance to attempt self feeding. Patient unable to maintain arousal, kept eyes closed. She did not participate in self feeding in spite of cues. Only had tiny portions of food/drink and required cues to swallow.   Activity ceased due to patient's inattention to task/drowsiness. Current Level of Function Impacting Discharge (ADLs): total assistance for all self care tasks and related mobility    Other factors to consider for discharge: PLOF: ambulatory at RW level          PLAN :  Patient continues to benefit from skilled intervention to address the above impairments. Continue treatment per established plan of care to address goals. Recommend with staff: encourage self feeding    Recommend next OT session: progress POC as able     Recommendation for discharge: (in order for the patient to meet his/her long term goals)  Therapy up to 5 days/week in SNF setting    This discharge recommendation:  Has been made in collaboration with the attending provider and/or case management    IF patient discharges home will need the following DME: wheelchair       SUBJECTIVE:   Patient mostly kept eyes closed during session. OBJECTIVE DATA SUMMARY:   Cognitive/Behavioral Status:  Neurologic State: Confused;Drowsy  Orientation Level: Oriented to person                Functional Mobility and Transfers for ADLs:  Bed Mobility:       Transfers:             Balance:       ADL Intervention:  Feeding  Feeding Assistance: Total assistance (dependent)     Trialed hand over hand assistance for bringing cup to mouth. Cues required to swallow, close lips around spoon        Pain:  No pain indicated    Activity Tolerance:   Poor    After treatment patient left in no apparent distress:   Supine in bed, Caregiver / family present, and Side rails x 3    COMMUNICATION/COLLABORATION:   The patients plan of care was discussed with: Registered nurse.      Carroll Moncada OT  Time Calculation: 18 mins

## 2021-05-12 VITALS
BODY MASS INDEX: 24.41 KG/M2 | OXYGEN SATURATION: 97 % | RESPIRATION RATE: 17 BRPM | HEART RATE: 100 BPM | DIASTOLIC BLOOD PRESSURE: 61 MMHG | TEMPERATURE: 98.1 F | WEIGHT: 129.2 LBS | SYSTOLIC BLOOD PRESSURE: 133 MMHG

## 2021-05-12 LAB
ANION GAP SERPL CALC-SCNC: 5 MMOL/L (ref 5–15)
BUN SERPL-MCNC: 28 MG/DL (ref 6–20)
BUN/CREAT SERPL: 72 (ref 12–20)
CALCIUM SERPL-MCNC: 8 MG/DL (ref 8.5–10.1)
CHLORIDE SERPL-SCNC: 113 MMOL/L (ref 97–108)
CO2 SERPL-SCNC: 24 MMOL/L (ref 21–32)
CREAT SERPL-MCNC: 0.39 MG/DL (ref 0.55–1.02)
ERYTHROCYTE [DISTWIDTH] IN BLOOD BY AUTOMATED COUNT: 13.5 % (ref 11.5–14.5)
GLUCOSE SERPL-MCNC: 92 MG/DL (ref 65–100)
HCT VFR BLD AUTO: 31.7 % (ref 35–47)
HGB BLD-MCNC: 9.9 G/DL (ref 11.5–16)
MAGNESIUM SERPL-MCNC: 2 MG/DL (ref 1.6–2.4)
MCH RBC QN AUTO: 30.2 PG (ref 26–34)
MCHC RBC AUTO-ENTMCNC: 31.2 G/DL (ref 30–36.5)
MCV RBC AUTO: 96.6 FL (ref 80–99)
NRBC # BLD: 0 K/UL (ref 0–0.01)
NRBC BLD-RTO: 0 PER 100 WBC
PHOSPHATE SERPL-MCNC: 2.9 MG/DL (ref 2.6–4.7)
PLATELET # BLD AUTO: 203 K/UL (ref 150–400)
PMV BLD AUTO: 10.2 FL (ref 8.9–12.9)
POTASSIUM SERPL-SCNC: 3.8 MMOL/L (ref 3.5–5.1)
RBC # BLD AUTO: 3.28 M/UL (ref 3.8–5.2)
SODIUM SERPL-SCNC: 142 MMOL/L (ref 136–145)
WBC # BLD AUTO: 7.7 K/UL (ref 3.6–11)

## 2021-05-12 PROCEDURE — 84100 ASSAY OF PHOSPHORUS: CPT

## 2021-05-12 PROCEDURE — 74011250636 HC RX REV CODE- 250/636: Performed by: INTERNAL MEDICINE

## 2021-05-12 PROCEDURE — 74011250637 HC RX REV CODE- 250/637: Performed by: INTERNAL MEDICINE

## 2021-05-12 PROCEDURE — 74011250637 HC RX REV CODE- 250/637: Performed by: HOSPITALIST

## 2021-05-12 PROCEDURE — 80048 BASIC METABOLIC PNL TOTAL CA: CPT

## 2021-05-12 PROCEDURE — 85027 COMPLETE CBC AUTOMATED: CPT

## 2021-05-12 PROCEDURE — 83735 ASSAY OF MAGNESIUM: CPT

## 2021-05-12 PROCEDURE — 36415 COLL VENOUS BLD VENIPUNCTURE: CPT

## 2021-05-12 RX ORDER — AMOXICILLIN 250 MG
1 CAPSULE ORAL 2 TIMES DAILY
Qty: 10 TAB | Refills: 0 | Status: SHIPPED
Start: 2021-05-12

## 2021-05-12 RX ORDER — ACETAMINOPHEN 325 MG/1
650 TABLET ORAL EVERY 8 HOURS
Qty: 30 TAB | Refills: 0 | Status: SHIPPED
Start: 2021-05-12

## 2021-05-12 RX ORDER — TRAMADOL HYDROCHLORIDE 50 MG/1
50 TABLET ORAL
Qty: 10 TAB | Refills: 0 | Status: SHIPPED | OUTPATIENT
Start: 2021-05-12 | End: 2021-05-15

## 2021-05-12 RX ORDER — POLYETHYLENE GLYCOL 3350 17 G/17G
17 POWDER, FOR SOLUTION ORAL DAILY
Qty: 5 PACKET | Refills: 0 | Status: SHIPPED
Start: 2021-05-13 | End: 2021-05-18

## 2021-05-12 RX ADMIN — LORATADINE 10 MG: 10 TABLET ORAL at 10:16

## 2021-05-12 RX ADMIN — DOCUSATE SODIUM 50 MG AND SENNOSIDES 8.6 MG 1 TABLET: 8.6; 5 TABLET, FILM COATED ORAL at 10:17

## 2021-05-12 RX ADMIN — MEMANTINE HYDROCHLORIDE 10 MG: 10 TABLET ORAL at 10:17

## 2021-05-12 RX ADMIN — Medication 1 TABLET: at 10:17

## 2021-05-12 RX ADMIN — POLYETHYLENE GLYCOL 3350 17 G: 17 POWDER, FOR SOLUTION ORAL at 10:16

## 2021-05-12 RX ADMIN — SODIUM CHLORIDE 75 ML/HR: 9 INJECTION, SOLUTION INTRAVENOUS at 10:30

## 2021-05-12 RX ADMIN — ACETAMINOPHEN 650 MG: 325 TABLET ORAL at 05:22

## 2021-05-12 RX ADMIN — ACETAMINOPHEN 650 MG: 325 TABLET ORAL at 14:00

## 2021-05-12 RX ADMIN — BUSPIRONE HYDROCHLORIDE 10 MG: 10 TABLET ORAL at 10:17

## 2021-05-12 RX ADMIN — ASPIRIN 81 MG: 81 TABLET, COATED ORAL at 10:17

## 2021-05-12 RX ADMIN — RISPERIDONE 0.25 MG: 0.5 TABLET ORAL at 10:38

## 2021-05-12 NOTE — DISCHARGE INSTRUCTIONS
Post op Discharge Instructions Partial Hip Replacement     Patient Name: Martha Bartlett  Date of procedure: 5/8/2021   Procedure: Procedure(s):  LEFT HIP HEMIARTHROPLASTY  Surgeon: Glenn Gonsales) and Role:     Fredo Nye MD - Primary   PCP: No primary care provider on file. Date of discharge: No discharge date for patient encounter. Follow up appointment with surgeon   See Surgeon(s) and Role:  Ed Bauer MD - Primary approximately 3-4 weeks from date of surgery. Call [phone] to make an appointment. When to call your Orthopaedic Surgeon. If you call after 5pm or on a weekend, the on call physician will be contacted   Pain that is not relieved by pain medication, ice, activity   Signs of infection  o Incision is reddened  o Incision continues to drain; drainage has an odor  o Persistent fever over 101 degrees   Signs of a blood clot in your leg  o Calf pain, tenderness, redness and/or swelling of lower leg    When to call your Primary Care Physician   Concerns about medical conditions such as diabetes, high blood pressure, asthma, congestive heart failure   Call if blood sugars are elevated, persistent headache or dizziness, coughing or congestion, constipation or diarrhea, burning with urination, abnormal heart rate (slow or fast)    When to call 911 and go to the nearest emergency room   Acute onset of chest pain, shortness of breath, difficulty breathing    Activity   Walk with your walker WBAT weight bearing as instructed by your physical therapist. Continue using your walker until seen for follow-up visit.      Practice your exercises 3 times a day as instructed by the physical therapist.  Dietra Manner up frequently and walk (with assistance as needed)   You may not drive     Routine Hip Precautions - Maintain for 6 weeks post-surgery date - always get clarification from the physician before discontinuing   No straight leg raise   No internal rotation   No adduction past midline   No flexion beyond 90 degrees          Incision Care   Keep a dressing on your incision and change daily. Once your incision is not draining, you may leave it open to air.  Wash hands thoroughly before changing the dressing.  You may take a shower when your incision is dry. Do not take a tub bath or go swimming   You do have staples in your incision. If present they will be removed by the [address] [place] [address] Waldemar Rollins, SNF or Rehab staff in 10-14 days. Preventing blood clots   Aspirin 81 mg one tablet twice daily for one month   Take Aspirin as prescribed    Pain management   Take pain medication as prescribed; decrease the amount you take as your pain lessens   Avoid alcoholic beverages while taking pain medications   Place an ice bag on the hip for 15-20 minutes after exercising and as needed throughout the day and night    Diet   Resume usual diet; drink plenty of fluids; eat foods high in fiber, calcium and vitamin D.   You may want to take a stool softener (such as Senokot-S or Colace) to prevent constipations while you are taking pain medication.    If constipation occurs, take a laxative (such as Dulcolax tablets, Miralax, or a suppository)

## 2021-05-12 NOTE — PROGRESS NOTES
Bedside and Verbal shift change report given to Yves Flores (oncoming nurse) by Emily Griffin (offgoing nurse). Report included the following information SBAR, Kardex, Procedure Summary, Intake/Output and MAR.

## 2021-05-12 NOTE — PROGRESS NOTES
NELDA: Plan for discharge to [address] today. [facility] phone [phone] transport time set for 2 PM.     Discharge folder located on hard chart to include ambulance form, discharge papers, MAR, Kardex. RN to call report to #[phone]. Chart reviewed. CM spoke with Dr. Codie Rogers. The MD at the SNF has not called her yet. CM called and left message for Brenda Hwang at [facility] to request contact number for their MD.    CM requested transport with [facility], they can do 2 PM.    CM notified the son Kaleigh Weathers (#[phone]). Medicare pt has received, reviewed, and signed 2nd IM letter informing them of their right to appeal the discharge. Signed copy has been placed on pt bedside chart. CM faxed discharge papers to [facility] at [phone].     MACIEL Lomeli/CRM

## 2021-05-12 NOTE — ROUTINE PROCESS
I have reviewed discharge instructions with [facility] of [address] She verbalized understanding. Ice PAcks sent with [facility].

## 2021-05-12 NOTE — DISCHARGE SUMMARY
Discharge Summary       PATIENT ID: Sebastian Norton  MRN: [id number]   YOB: 1925    DATE OF ADMISSION: 5/7/2021 10:05 AM    DATE OF DISCHARGE: 5/12/2021  PRIMARY CARE PROVIDER: Annette Tran MD     ATTENDING PHYSICIAN: Annika Friedman DO   DISCHARGING PROVIDER: Annika Friedman DO    To contact this individual call [phone] and ask the  to page. If unavailable ask to be transferred the Adult Hospitalist Department. CONSULTATIONS: None    PROCEDURES/SURGERIES: Procedure(s):  LEFT HIP HEMIARTHROPLASTY    [address] COURSE:     77-year-old female with past medical history dementia, hypertension, presenting to [address] [place] [address] with fall. Found to have left femoral neck fracture and underwent left hip arthroplasty 5/8/2021. Postoperatively, patient drowsy and reqired max assistance to sit in bed. She had diminished oral intake. Vitals/labs remained stable and no other evidence for underlying lethargy was found. She was stable to discharge to [address] [place] [address] for ongoing rehab. I discussed her poor post-operative performance status with her daughter, Antonio Noel. We discussed that if patient does not improve with rehab, she may need to be transitioned to hospice in the future. Her daughter voiced understanding and agreed to discharge from the hospital.       Beka Turpin / PLAN:      Left femoral neck fracture:   -Ortho consulted  -s/p left hip arthroplasty 5/8  -continue scheduled tylenol, tramadol prn   -DVT prophylaxis with aspirin   -bowel regimen   -PT/OT- needing max assistance to sit in bed      Dehydration:   -from minimal oral intake, will start IVFs and monitor output     Urinary retention:   -dehydration contributing.  Leone removed and then subsequently replced     Dementia:   -continue home medications     HTN: continue to hold amlodipine         ADDITIONAL CARE RECOMMENDATIONS:   See discharge instructions    PENDING TEST RESULTS:   At the time of discharge the following test results are still pending: none    FOLLOW UP APPOINTMENTS:    Follow-up Information     Follow up With Specialties Details Why Contact Info    Tracy Peña MD Internal Medicine   [address]   [address] [address] [facility] [address] [phone] [address]    [address] [facility] [address] [phone]          DISCHARGE MEDICATIONS:  Current Discharge Medication List      START taking these medications    Details   senna-docusate (PERICOLACE) 8.6-50 mg per tablet Take 1 Tab by mouth two (2) times a day. Qty: 10 Tab, Refills: 0  Start date: 5/12/2021      polyethylene glycol (MIRALAX) 17 gram packet Take 1 Packet by mouth daily for 5 days. Qty: 5 Packet, Refills: 0  Start date: 5/13/2021, End date: 5/18/2021      traMADoL (ULTRAM) 50 mg tablet Take 1 Tab by mouth every six (6) hours as needed for Pain for up to 3 days. Max Daily Amount: 200 mg. Qty: 10 Tab, Refills: 0  Start date: 5/12/2021, End date: 5/15/2021    Associated Diagnoses: Closed fracture of left hip, initial encounter ([facility] [address]         CONTINUE these medications which have CHANGED    Details   acetaminophen (TYLENOL) 325 mg tablet Take 2 Tabs by mouth every eight (8) hours. Qty: 30 Tab, Refills: 0  Start date: 5/12/2021         CONTINUE these medications which have NOT CHANGED    Details   !! risperiDONE (RisperDAL) 1 mg tablet Take 1 mg by mouth every evening. Patient takes in the evening at 1700. traZODone (DESYREL) 50 mg tablet Take 50 mg by mouth nightly. aspirin delayed-release 81 mg tablet Take 1 Tab by mouth two (2) times a day. Qty: 60 Tab, Refills: 0      !! risperiDONE (RisperDAL) 0.25 mg tablet Take 0.25 mg by mouth Daily (before lunch). loratadine (CLARITIN) 10 mg tablet Take 1 Tab by mouth daily. For allergies. Qty: 1 Tab, Refills: 0      busPIRone (BUSPAR) 10 mg tablet Take 1 Tab by mouth three (3) times daily.   Qty: 1 Tab, Refills: 0 multivitamin (ONE A DAY) tablet Take 1 Tab by mouth daily. memantine (NAMENDA) 10 mg tablet TAKE 1 TABLET BY MOUTH TWICE A DAY  Qty: 60 Tab, Refills: 5       !! - Potential duplicate medications found. Please discuss with provider. STOP taking these medications       amLODIPine (NORVASC) 2.5 mg tablet Comments:   Reason for Stopping:                 NOTIFY YOUR PHYSICIAN FOR ANY OF THE FOLLOWING:   Fever over 101 degrees for 24 hours. Chest pain, shortness of breath, fever, chills, nausea, vomiting, diarrhea, change in mentation, falling, weakness, bleeding. Severe pain or pain not relieved by medications. Or, any other signs or symptoms that you may have questions about. DISPOSITION:    Home With:   OT  PT  HH  RN      x Long term SNF/Inpatient Rehab    Independent/assisted living    Hospice    Other:       PATIENT CONDITION AT DISCHARGE:     Functional status   x Poor     Deconditioned     Independent      Cognition     Lucid     Forgetful    x Dementia      Catheters/lines (plus indication)    Leone     PICC     PEG    x None      Code status    Full code    x DNR      PHYSICAL EXAMINATION AT DISCHARGE:  Constitutional:  No acute distress, cooperative, pleasant, demented, closes eyes when speaking    ENT:  Oral mucosa moist, oropharynx benign. Resp:  CTA bilaterally. No wheezing/rhonchi/rales. No accessory muscle use   CV:  Regular rhythm, normal rate, no murmurs, gallops, rubs    GI:  Soft, non distended, non tender.  normoactive bowel sounds, no hepatosplenomegaly     Musculoskeletal:  No edema, warm, 2+ pulses throughout    Neurologic:  Moves all extremities, can follow commands, alert to name only           CHRONIC MEDICAL DIAGNOSES:  Problem List as of 5/12/2021 Date Reviewed: 5/8/2021          Codes Class Noted - Resolved    Hip fracture ([facility] [address] ICD-10-CM: S72.009A  ICD-9-CM: 820.8  5/7/2021 - Present        Femoral neck fracture ([facility] [address] ICD-10-CM: L84.686G  ICD-9-CM: 820.8  11/10/2020 - Present        Femur fracture ([facility] [address] ICD-10-CM: S72.90XA  ICD-9-CM: 821.00  11/9/2020 - Present        Moderate dementia with behavioral disturbance (HCC) ICD-10-CM: F03.91  ICD-9-CM: 294.21  2/20/2018 - Present        Primary insomnia ICD-10-CM: F51.01  ICD-9-CM: 307.42  2/20/2018 - Present        Advance directive discussed with patient ICD-10-CM: Z71.89  ICD-9-CM: V65.49  3/13/2017 - Present        Other abnormal glucose ICD-10-CM: R73.09  ICD-9-CM: 790.29  4/6/2012 - Present        Encounter for long-term (current) use of other medications ICD-10-CM: Z79.899  ICD-9-CM: V58.69  4/6/2012 - Present        Unspecified disorder of kidney and ureter ICD-10-CM: N28.9  ICD-9-CM: 593.9  2/21/2011 - Present        Essential hypertension, benign ICD-10-CM: I10  ICD-9-CM: 401.1  11/23/2009 - Present        Nausea alone ICD-10-CM: R11.0  ICD-9-CM: 787.02  11/23/2009 - Present        Memory loss ICD-10-CM: R41.3  ICD-9-CM: 780.93  11/23/2009 - Present        Symptomatic menopausal or female climacteric states ICD-10-CM: N95.1  ICD-9-CM: 627.2  11/23/2009 - Present        Arthropathy, unspecified, site unspecified ICD-10-CM: M12.9  ICD-9-CM: 716.90  11/23/2009 - Present        Disorder of bone and cartilage, unspecified ICD-10-CM: M89.9, M94.9  ICD-9-CM: 733.90  11/23/2009 - Present              Greater than 30 minutes were spent with the patient on counseling and coordination of care    Signed:   Pat Epperson DO  5/12/2021  2:55 PM

## 2021-05-12 NOTE — PROGRESS NOTES
13:10- Dr. Coley Spatz informed patient is still being scanned for only 215 mL after maki coming out nearly 12 hours ago and hasn't voided. MD said to give her until dinner time and then bladder scan again. 17:00- MD informed patient's bladder scan is showing 268 mL. Order received for maki catheter for patient to be discharged with.

## 2021-05-13 ENCOUNTER — PATIENT OUTREACH (OUTPATIENT)
Dept: CASE MANAGEMENT | Age: 86
End: 2021-05-13

## 2022-03-18 PROBLEM — S72.009A FEMORAL NECK FRACTURE (HCC): Status: ACTIVE | Noted: 2020-11-10

## 2022-03-18 PROBLEM — F03.B18 MODERATE DEMENTIA WITH BEHAVIORAL DISTURBANCE (HCC): Status: ACTIVE | Noted: 2018-02-20

## 2022-03-19 PROBLEM — S72.009A HIP FRACTURE (HCC): Status: ACTIVE | Noted: 2021-05-07

## 2022-03-19 PROBLEM — Z71.89 ADVANCE DIRECTIVE DISCUSSED WITH PATIENT: Status: ACTIVE | Noted: 2017-03-13

## 2022-03-20 PROBLEM — F51.01 PRIMARY INSOMNIA: Status: ACTIVE | Noted: 2018-02-20

## 2022-03-20 PROBLEM — S72.90XA FEMUR FRACTURE (HCC): Status: ACTIVE | Noted: 2020-11-09

## 2022-09-12 NOTE — PROGRESS NOTES
Bedside and Verbal shift change report given to Evette Escalona (oncoming nurse) by Anabelle Bailey (offgoing nurse). Report included the following information SBAR. Protopic Counseling: Patient may experience a mild burning sensation during topical application. Protopic is not approved in children less than 2 years of age. There have been case reports of hematologic and skin malignancies in patients using topical calcineurin inhibitors although causality is questionable.

## 2023-05-10 RX ORDER — LORATADINE 10 MG/1
10 TABLET ORAL DAILY
COMMUNITY
Start: 2020-08-27

## 2023-05-10 RX ORDER — ASPIRIN 81 MG/1
81 TABLET ORAL 2 TIMES DAILY
COMMUNITY
Start: 2020-11-13

## 2023-05-10 RX ORDER — BUSPIRONE HYDROCHLORIDE 10 MG/1
10 TABLET ORAL 3 TIMES DAILY
COMMUNITY
Start: 2020-02-11

## 2023-05-10 RX ORDER — ACETAMINOPHEN 325 MG/1
650 TABLET ORAL EVERY 8 HOURS
COMMUNITY
Start: 2021-05-12

## 2023-05-10 RX ORDER — AMOXICILLIN 250 MG
1 CAPSULE ORAL 2 TIMES DAILY
COMMUNITY
Start: 2021-05-12

## 2023-05-10 RX ORDER — TRAZODONE HYDROCHLORIDE 50 MG/1
50 TABLET ORAL NIGHTLY
COMMUNITY

## 2023-05-10 RX ORDER — RISPERIDONE 0.25 MG/1
0.25 TABLET ORAL
COMMUNITY

## 2023-05-10 RX ORDER — RISPERIDONE 1 MG/1
1 TABLET ORAL EVERY EVENING
COMMUNITY

## 2023-05-10 RX ORDER — MEMANTINE HYDROCHLORIDE 10 MG/1
1 TABLET ORAL 2 TIMES DAILY
COMMUNITY
Start: 2016-05-04

## (undated) DEVICE — SUTURE VCRL 1 L27IN ABSRB CT BRAID COAT UD J281H

## (undated) DEVICE — COVER,MAYO STAND,STERILE: Brand: MEDLINE

## (undated) DEVICE — INFECTION CONTROL KIT SYS

## (undated) DEVICE — SUTURE VCRL SZ 3-0 L27IN ABSRB UD L36MM CT-1 1/2 CIR J258H

## (undated) DEVICE — SCRUB DRY SURG EZ SCRUB BRUSH PREOPERATIVE GRN

## (undated) DEVICE — SUTURE VCRL SZ 2-0 L36IN ABSRB UD L40MM CT 1/2 CIR J957H

## (undated) DEVICE — Device

## (undated) DEVICE — SOLUTION IRRIG 3000ML 0.9% SOD CHL FLX CONT 0797208] ICU MEDICAL INC]

## (undated) DEVICE — DRESSING HYDROCOLLOID BORDER 35X10 IN ALUM PRIMASEAL

## (undated) DEVICE — SOLUTION SURG PREP 26 CC PURPREP

## (undated) DEVICE — STERILE POLYISOPRENE POWDER-FREE SURGICAL GLOVES WITH EMOLLIENT COATING: Brand: PROTEXIS

## (undated) DEVICE — HANDPIECE SET WITH BONE CLEANING TIP AND SUCTION TUBE: Brand: INTERPULSE

## (undated) DEVICE — STERILE POLYISOPRENE POWDER-FREE SURGICAL GLOVES: Brand: PROTEXIS

## (undated) DEVICE — SUTURE MCRYL SZ 2-0 L36IN ABSRB UD L36MM CT-1 1/2 CIR Y945H

## (undated) DEVICE — PREP KIT PEEL PTCH POVIDONE IOD

## (undated) DEVICE — SOLUTION IRRIG 1000ML H2O STRL BLT

## (undated) DEVICE — DERMABOND SKIN ADH 0.7ML -- DERMABOND ADVANCED 12/BX

## (undated) DEVICE — PADDING CAST SPEC 6INX4YD COT --

## (undated) DEVICE — SYR LR LCK 1ML GRAD NSAF 30ML --

## (undated) DEVICE — SUTURE VCRL 0 L27IN ABSRB UD CT L40MM 1/2 CIR TAPERPOINT J280H

## (undated) DEVICE — T4 HOOD

## (undated) DEVICE — REM POLYHESIVE ADULT PATIENT RETURN ELECTRODE: Brand: VALLEYLAB

## (undated) DEVICE — BLADE SAW W11XL77.5MM THK1.23MM CUT THK1.17MM S STL RECIP

## (undated) DEVICE — SUTURE STRATAFIX SPRL SZ 1 L14IN ABSRB VLT L48CM CTX 1/2 SXPD2B405

## (undated) DEVICE — BLADE ELECTRODE: Brand: EDGE

## (undated) DEVICE — PILLOW POS W15XH6XL22IN RASPBERRY FOAM ABD W/ STRP DISP FOR

## (undated) DEVICE — SUTURE ETHBND EXCEL SZ 2 L30IN NONABSORBABLE GRN L40MM V-37 MX69G

## (undated) DEVICE — 3M™ IOBAN™ 2 ANTIMICROBIAL INCISE DRAPE 6651EZ: Brand: IOBAN™ 2

## (undated) DEVICE — SUTURE VCRL SZ 2-0 L36IN ABSRB UD L36MM CT-1 1/2 CIR J945H

## (undated) DEVICE — PATIENT PROTECTIVE PAD FOR IMP UNIVERSAL LATERAL HIP POSITIONER (ULP) (6/CASE): Brand: PATIENT PROTECTIVE PAD

## (undated) DEVICE — PREP SKN CHLRAPRP APL 26ML STR --

## (undated) DEVICE — NEEDLE HYPO 21GA L1.5IN INTRAMUSCULAR S STL LATCH BVL UP

## (undated) DEVICE — STRAP,POSITIONING,KNEE/BODY,FOAM,4X60": Brand: MEDLINE

## (undated) DEVICE — 6619 2 PTNT ISO SYS INCISE AREA&LT;(&GT;&&LT;)&GT;P: Brand: STERI-DRAPE™ IOBAN™ 2

## (undated) DEVICE — BLADE SURG SAW SAG S STL AGG 90MM LEN 80MM CUT DEPTH 25.4MM

## (undated) DEVICE — KIT BNE CEM PREP PLUG BRSH CURET SPNG W/ RESTRIC FOR FEM

## (undated) DEVICE — Z DUP USE 2275493 DRESSING ALGINATE POST OPERATIVE 10X3.5 IN RECT PRIMASEAL

## (undated) DEVICE — TOTAL TRAY, 16FR 10ML SIL FOLEY, URN: Brand: MEDLINE

## (undated) DEVICE — SOL IRR STRL H2O 1000ML BTL --

## (undated) DEVICE — 4-PORT MANIFOLD: Brand: NEPTUNE 2

## (undated) DEVICE — SUTURE MCRYL SZ 3-0 L27IN ABSRB UD L19MM PS-2 3/8 CIR PRIM Y427H

## (undated) DEVICE — SUTURE STRATAFIX SYMMETRIC PDS + SZ 1 L18IN ABSRB VLT L48MM SXPP1A400

## (undated) DEVICE — SOL IRR SOD CL 0.9% 3000ML --

## (undated) DEVICE — PAD BD MATTRESS 73X32 IN STD CONVOLUTED FOAM LTX FREE

## (undated) DEVICE — SUTURE VCRL SZ 1 L27IN ABSRB UD L36MM CP-1 1/2 CIR REV CUT J268H